# Patient Record
Sex: FEMALE | Race: BLACK OR AFRICAN AMERICAN | Employment: OTHER | URBAN - METROPOLITAN AREA
[De-identification: names, ages, dates, MRNs, and addresses within clinical notes are randomized per-mention and may not be internally consistent; named-entity substitution may affect disease eponyms.]

---

## 2017-04-25 ENCOUNTER — LAB ONLY (OUTPATIENT)
Dept: INTERNAL MEDICINE CLINIC | Age: 72
End: 2017-04-25

## 2017-04-25 DIAGNOSIS — I10 ESSENTIAL HYPERTENSION: ICD-10-CM

## 2017-04-25 DIAGNOSIS — M25.561 CHRONIC PAIN OF BOTH KNEES: ICD-10-CM

## 2017-04-25 DIAGNOSIS — K21.9 GASTROESOPHAGEAL REFLUX DISEASE WITHOUT ESOPHAGITIS: ICD-10-CM

## 2017-04-25 DIAGNOSIS — E78.00 PURE HYPERCHOLESTEROLEMIA: ICD-10-CM

## 2017-04-25 DIAGNOSIS — G89.29 CHRONIC PAIN OF BOTH KNEES: ICD-10-CM

## 2017-04-25 DIAGNOSIS — I25.10 CORONARY ARTERY DISEASE INVOLVING NATIVE CORONARY ARTERY OF NATIVE HEART WITHOUT ANGINA PECTORIS: ICD-10-CM

## 2017-04-25 DIAGNOSIS — R73.03 PREDIABETES: ICD-10-CM

## 2017-04-25 DIAGNOSIS — M25.562 CHRONIC PAIN OF BOTH KNEES: ICD-10-CM

## 2017-04-25 DIAGNOSIS — R04.0 NASAL BLEEDING: ICD-10-CM

## 2017-04-26 ENCOUNTER — DOCUMENTATION ONLY (OUTPATIENT)
Dept: INTERNAL MEDICINE CLINIC | Age: 72
End: 2017-04-26

## 2017-04-26 LAB
ALBUMIN SERPL-MCNC: 4.3 G/DL (ref 3.5–4.8)
ALBUMIN/GLOB SERPL: 1.5 {RATIO} (ref 1.2–2.2)
ALP SERPL-CCNC: 65 IU/L (ref 39–117)
ALT SERPL-CCNC: 18 IU/L (ref 0–32)
AST SERPL-CCNC: 16 IU/L (ref 0–40)
BILIRUB SERPL-MCNC: 0.3 MG/DL (ref 0–1.2)
BUN SERPL-MCNC: 26 MG/DL (ref 8–27)
BUN/CREAT SERPL: 24 (ref 12–28)
CALCIUM SERPL-MCNC: 9.6 MG/DL (ref 8.7–10.3)
CHLORIDE SERPL-SCNC: 101 MMOL/L (ref 96–106)
CHOLEST SERPL-MCNC: 127 MG/DL (ref 100–199)
CO2 SERPL-SCNC: 26 MMOL/L (ref 18–29)
CREAT SERPL-MCNC: 1.1 MG/DL (ref 0.57–1)
ERYTHROCYTE [DISTWIDTH] IN BLOOD BY AUTOMATED COUNT: 17.7 % (ref 12.3–15.4)
EST. AVERAGE GLUCOSE BLD GHB EST-MCNC: 131 MG/DL
GLOBULIN SER CALC-MCNC: 2.9 G/DL (ref 1.5–4.5)
GLUCOSE SERPL-MCNC: 107 MG/DL (ref 65–99)
HBA1C MFR BLD: 6.2 % (ref 4.8–5.6)
HCT VFR BLD AUTO: 37.9 % (ref 34–46.6)
HDLC SERPL-MCNC: 48 MG/DL
HGB BLD-MCNC: 11.6 G/DL (ref 11.1–15.9)
LDLC SERPL CALC-MCNC: 68 MG/DL (ref 0–99)
MCH RBC QN AUTO: 22.7 PG (ref 26.6–33)
MCHC RBC AUTO-ENTMCNC: 30.6 G/DL (ref 31.5–35.7)
MCV RBC AUTO: 74 FL (ref 79–97)
PLATELET # BLD AUTO: 274 X10E3/UL (ref 150–379)
POTASSIUM SERPL-SCNC: 3.8 MMOL/L (ref 3.5–5.2)
PROT SERPL-MCNC: 7.2 G/DL (ref 6–8.5)
RBC # BLD AUTO: 5.12 X10E6/UL (ref 3.77–5.28)
SODIUM SERPL-SCNC: 144 MMOL/L (ref 134–144)
TRIGL SERPL-MCNC: 57 MG/DL (ref 0–149)
VLDLC SERPL CALC-MCNC: 11 MG/DL (ref 5–40)
WBC # BLD AUTO: 6.7 X10E3/UL (ref 3.4–10.8)

## 2017-04-26 NOTE — PROGRESS NOTES
Medicare Part B Preventive Services Guidelines/Limitations Date last completed and Frequency Due Date   Bone Mass Measurement  (age 72 & older, biennial) Requires diagnosis related to osteoporosis or estrogen deficiency. Biennial benefit unless patient has history of long-term glucocorticoid tx or baseline is needed because initial test was by other method Completed 3/28/16      Recommended every 2 years Due 3/2018   Cardiovascular Screening Blood Tests (every 5 years)  Total cholesterol, HDL, Triglycerides Order as a panel if possible Completed 4/2017    Recommended annually Due 4/2018   Colorectal Cancer Screening  -Fecal occult blood test (annual)  -Flexible sigmoidoscopy (5y)  -Screening colonoscopy (10y)  -Barium Enema  Completed 10/07/14 with Dr. Christiano Rothman    Recommended every 5 years  Due 10/2019   Counseling to Prevent Tobacco Use (up to 8 sessions per year)  - Counseling greater than 3 and up to 10 minutes  - Counseling greater than 10 minutes Patients must be asymptomatic of tobacco-related conditions to receive as preventive service     Diabetes Screening Tests (at least every 3 years, Medicare covers annually or at 6-month intervals for prediabetic patients)    Fasting blood sugar (FBS) or glucose tolerance test (GTT) Patient must be diagnosed with one of the following:  -Hypertension, Dyslipidemia, obesity, previous impaired FBS or GTT  Or any two of the following: overweight, FH of diabetes, age ? 72, history of gestational diabetes, birth of baby weighing more than 9 pounds Completed a1c 6.2 in 4/2017    Recommended every 3-6 months for Pre-Diabetics and Diabetics Due 7/2017   Diabetes Self-Management Training (DSMT) (no USPSTF recommendation) Requires referral by treating physician for patient with diabetes or renal disease. 10 hours of initial DSMT session of no less than 30 minutes each in a continuous 12-month period. 2 hours of follow-up DSMT in subsequent years.      Glaucoma Screening (no USPSTF recommendation) Diabetes mellitus, family history, , age 48 or over,  American, age 72 or over Completed with Dr. Reno Romano 3/17/16    Recommended annually Due now; please call and schedule this   Human Immunodeficiency Virus (HIV) Screening (annually for increased risk patients)  HIV-1 and HIV-2 by EIA, CRISTY, rapid antibody test, or oral mucosa transudate Patient must be at increased risk for HIV infection per USPSTF guidelines or pregnant. Tests covered annually for patients at increased risk. Pregnant patients may receive up to 3 test during pregnancy. n/a N/a   Medical Nutrition Therapy (MNT) (for diabetes or renal disease not recommended schedule) Requires referral by treating physician for patient with diabetes or renal disease. Can be provided in same year as diabetes self-management training (DSMT), and CMS recommends medical nutrition therapy take place after DSMT. Up to 3 hours for initial year and 2 hours in subsequent years. N/a  N/a    Prostate Cancer Screening (annually up to age 76)  - Digital rectal exam (LAYLA)  - Prostate specific antigen (PSA) Annually (age 48 or over), LAYLA not paid separately when covered E/M service is provided on same date N/a  N/a    Seasonal Influenza Vaccination (annually)  Completed 11/01/2016    Recommended annually   Due fall 2017   Pneumococcal Vaccination (once after 72)  Pneumococcal 23 - completed 9/02/2014  Recommended once over the age of 72    Prevnar 15 -  Completed 11/03/2016 Recommended once over the age of 72   Complete          Complete   Hepatitis B Vaccinations (if medium/high risk) Medium/high risk factors:  End-stage renal disease,  Hemophiliacs who received Factor VIII or IX concentrates, Clients of institutions for the mentally retarded, Persons who live in the same house as a HepB virus carrier, Homosexual men, Illicit injectable drug abusers. N/a  N/a    Screening Mammography (biennial age 54-69)?  Annually (age 36 or over) Completed 7/18/16 and negative     Recommended annually   Due 7/2017   Screening Pap Tests and Pelvic Examination (up to age 79 and after 79 if unknown history or abnormal study last 10 years) Every 25 months except high risk Completed with Dr. Marco A Ortega in 7/2015    Recommended every other year  Due 7/2017   Ultrasound Screening for Abdominal Aortic Aneurysm (AAA) (once) Patient must be referred through [facility] and not have had a screening for abdominal aortic aneurysm before under Medicare.   Limited to patients who meet one of the following criteria:  - Men who are 73-68 years old and have smoked more than 100 cigarettes in their lifetime.  -Anyone with a FH of AAA  -Anyone recommended for screening by USPSTF 4/2014 and negative Completed    Family Practice Management 2011

## 2017-05-01 ENCOUNTER — OFFICE VISIT (OUTPATIENT)
Dept: INTERNAL MEDICINE CLINIC | Age: 72
End: 2017-05-01

## 2017-05-01 VITALS
TEMPERATURE: 98.1 F | SYSTOLIC BLOOD PRESSURE: 122 MMHG | OXYGEN SATURATION: 99 % | WEIGHT: 146.2 LBS | HEART RATE: 74 BPM | HEIGHT: 67 IN | BODY MASS INDEX: 22.95 KG/M2 | RESPIRATION RATE: 16 BRPM | DIASTOLIC BLOOD PRESSURE: 68 MMHG

## 2017-05-01 DIAGNOSIS — K21.9 GASTROESOPHAGEAL REFLUX DISEASE WITHOUT ESOPHAGITIS: ICD-10-CM

## 2017-05-01 DIAGNOSIS — I10 ESSENTIAL HYPERTENSION: Primary | ICD-10-CM

## 2017-05-01 DIAGNOSIS — Z12.31 ENCOUNTER FOR MAMMOGRAM TO ESTABLISH BASELINE MAMMOGRAM: ICD-10-CM

## 2017-05-01 DIAGNOSIS — Z13.39 SCREENING FOR ALCOHOLISM: ICD-10-CM

## 2017-05-01 DIAGNOSIS — R73.03 PREDIABETES: ICD-10-CM

## 2017-05-01 DIAGNOSIS — R73.01 IFG (IMPAIRED FASTING GLUCOSE): ICD-10-CM

## 2017-05-01 DIAGNOSIS — E78.00 PURE HYPERCHOLESTEROLEMIA: ICD-10-CM

## 2017-05-01 DIAGNOSIS — Z00.00 ROUTINE GENERAL MEDICAL EXAMINATION AT A HEALTH CARE FACILITY: ICD-10-CM

## 2017-05-01 DIAGNOSIS — I25.10 CORONARY ARTERY DISEASE INVOLVING NATIVE CORONARY ARTERY OF NATIVE HEART WITHOUT ANGINA PECTORIS: ICD-10-CM

## 2017-05-01 RX ORDER — FAMOTIDINE 20 MG/1
20 TABLET, FILM COATED ORAL 2 TIMES DAILY
Qty: 180 TAB | Refills: 3 | Status: SHIPPED | OUTPATIENT
Start: 2017-05-01 | End: 2018-05-01

## 2017-05-01 RX ORDER — FELODIPINE 5 MG/1
5 TABLET, EXTENDED RELEASE ORAL DAILY
Qty: 90 TAB | Refills: 1 | Status: SHIPPED | OUTPATIENT
Start: 2017-05-01 | End: 2017-11-01 | Stop reason: SDUPTHER

## 2017-05-01 RX ORDER — ATORVASTATIN CALCIUM 20 MG/1
20 TABLET, FILM COATED ORAL DAILY
Qty: 90 TAB | Refills: 3 | Status: SHIPPED | OUTPATIENT
Start: 2017-05-01 | End: 2017-11-01 | Stop reason: SDUPTHER

## 2017-05-01 RX ORDER — TRIAMTERENE AND HYDROCHLOROTHIAZIDE 37.5; 25 MG/1; MG/1
1 CAPSULE ORAL DAILY
Qty: 90 CAP | Refills: 1 | Status: SHIPPED | OUTPATIENT
Start: 2017-05-01 | End: 2017-11-01 | Stop reason: SDUPTHER

## 2017-05-01 NOTE — PATIENT INSTRUCTIONS
Medicare Part B Preventive Services Guidelines/Limitations Date last completed and Frequency Due Date   Bone Mass Measurement  (age 72 & older, biennial) Requires diagnosis related to osteoporosis or estrogen deficiency. Biennial benefit unless patient has history of long-term glucocorticoid tx or baseline is needed because initial test was by other method Completed 3/28/16        Recommended every 2 years Due 3/2018   Cardiovascular Screening Blood Tests (every 5 years)  Total cholesterol, HDL, Triglycerides Order as a panel if possible Completed 4/2017     Recommended annually Due 4/2018   Colorectal Cancer Screening  -Fecal occult blood test (annual)  -Flexible sigmoidoscopy (5y)  -Screening colonoscopy (10y)  -Barium Enema   Completed 10/07/14 with Dr. Santiago Felton     Recommended every 5 years  Due 10/2019   Counseling to Prevent Tobacco Use (up to 8 sessions per year)  - Counseling greater than 3 and up to 10 minutes  - Counseling greater than 10 minutes Patients must be asymptomatic of tobacco-related conditions to receive as preventive service  n/a  n/a   Diabetes Screening Tests (at least every 3 years, Medicare covers annually or at 6-month intervals for prediabetic patients)     Fasting blood sugar (FBS) or glucose tolerance test (GTT) Patient must be diagnosed with one of the following:  -Hypertension, Dyslipidemia, obesity, previous impaired FBS or GTT  Or any two of the following: overweight, FH of diabetes, age ? 72, history of gestational diabetes, birth of baby weighing more than 9 pounds Completed a1c 6.2 in 4/2017     Recommended every 3-6 months for Pre-Diabetics and Diabetics Due 7/2017   Diabetes Self-Management Training (DSMT) (no USPSTF recommendation) Requires referral by treating physician for patient with diabetes or renal disease. 10 hours of initial DSMT session of no less than 30 minutes each in a continuous 12-month period.  2 hours of follow-up DSMT in subsequent years.  n/a  n/a Glaucoma Screening (no USPSTF recommendation) Diabetes mellitus, family history, , age 48 or over,  American, age 72 or over Completed with Dr. Nory Winters 3/18     Recommended annually 3/18   Human Immunodeficiency Virus (HIV) Screening (annually for increased risk patients)  HIV-1 and HIV-2 by EIA, CRISTY, rapid antibody test, or oral mucosa transudate Patient must be at increased risk for HIV infection per USPSTF guidelines or pregnant. Tests covered annually for patients at increased risk. Pregnant patients may receive up to 3 test during pregnancy. n/a N/a   Medical Nutrition Therapy (MNT) (for diabetes or renal disease not recommended schedule) Requires referral by treating physician for patient with diabetes or renal disease. Can be provided in same year as diabetes self-management training (DSMT), and CMS recommends medical nutrition therapy take place after DSMT. Up to 3 hours for initial year and 2 hours in subsequent years. N/a  N/a    Prostate Cancer Screening (annually up to age 76)  - Digital rectal exam (LAYLA)  - Prostate specific antigen (PSA) Annually (age 48 or over), LAYLA not paid separately when covered E/M service is provided on same date N/a  N/a    Seasonal Influenza Vaccination (annually)   Completed 11/01/2016     Recommended annually Due fall 2017   Pneumococcal Vaccination (once after 72)   Pneumococcal 23 - completed 9/02/2014  Recommended once over the age of 72     Prevnar 15 -  Completed 11/03/2016 Recommended once over the age of 72 Complete              Complete   Hepatitis B Vaccinations (if medium/high risk) Medium/high risk factors: End-stage renal disease,  Hemophiliacs who received Factor VIII or IX concentrates, Clients of institutions for the mentally retarded, Persons who live in the same house as a HepB virus carrier, Homosexual men, Illicit injectable drug abusers. N/a  N/a    Screening Mammography (biennial age 54-69)?  Annually (age 36 or over) Completed 7/18/16 and negative      Recommended annually Due 7/2017   Screening Pap Tests and Pelvic Examination (up to age 79 and after 79 if unknown history or abnormal study last 10 years) Every 25 months except high risk Completed with Dr. Princess Morocho in 7/2015     Recommended every other year  Due 7/2017   Ultrasound Screening for Abdominal Aortic Aneurysm (AAA) (once) Patient must be referred through [facility] and not have had a screening for abdominal aortic aneurysm before under Medicare.  Limited to patients who meet one of the following criteria:  - Men who are 73-68 years old and have smoked more than 100 cigarettes in their lifetime.  -Anyone with a FH of AAA  -Anyone recommended for screening by USPSTF 4/2014 and negative Completed    Family Practice Management 2011

## 2017-05-01 NOTE — MR AVS SNAPSHOT
Visit Information Date & Time Provider Department Dept. Phone Encounter #  
 5/1/2017  8:45 AM Ana Mota, [address] [id number] Follow-up Instructions Return in about 6 months (around 11/1/2017). Upcoming Health Maintenance Date Due  
 MEDICARE YEARLY EXAM 5/4/2017 INFLUENZA AGE 9 TO ADULT 8/1/2017 GLAUCOMA SCREENING Q2Y 3/17/2018 BREAST CANCER SCRN MAMMOGRAM 7/18/2018 COLONOSCOPY 10/13/2024 DTaP/Tdap/Td series (2 - Td) 4/8/2025 Allergies as of 5/1/2017  Review Complete On: 5/1/2017 By: Sea Cat LPN No Known Allergies Current Immunizations  Reviewed on 4/2/2012 Name Date Influenza Vaccine 9/1/2014 Influenza Vaccine (Quad) PF 11/1/2016 Influenza Vaccine Split 10/1/2012 Influenza Vaccine Whole 10/1/2011 Pneumococcal Conjugate (PCV-13) 11/3/2016 Pneumococcal Polysaccharide (PPSV-23) 9/2/2014 Tdap 4/8/2015 Zoster Vaccine, Live 1/1/2010 Not reviewed this visit You Were Diagnosed With   
  
 Codes Comments Essential hypertension    -  Primary ICD-10-CM: I10 
ICD-9-CM: 401.9 Routine general medical examination at a health care facility     ICD-10-CM: Z00.00 ICD-9-CM: V70.0 Screening for alcoholism     ICD-10-CM: Z13.89 ICD-9-CM: V79.1 Coronary artery disease involving native coronary artery of native heart without angina pectoris     ICD-10-CM: I25.10 ICD-9-CM: 414.01   
 Pure hypercholesterolemia     ICD-10-CM: E78.00 ICD-9-CM: 272.0 Prediabetes     ICD-10-CM: R73.03 
ICD-9-CM: 790.29 Gastroesophageal reflux disease without esophagitis     ICD-10-CM: K21.9 ICD-9-CM: 530.81 IFG (impaired fasting glucose)     ICD-10-CM: R73.01 
ICD-9-CM: 790.21 Encounter for mammogram to establish baseline mammogram     ICD-10-CM: Z12.31 
ICD-9-CM: V76.12 Vitals BP Pulse Temp Resp Height(growth percentile) Weight(growth percentile) 122/68 (BP 1 Location: Left arm, BP Patient Position: Sitting) 74 98.1 °F (36.7 °C) (Oral) 16 5' 7\" (1.702 m) 146 lb 3.2 oz (66.3 kg) SpO2 BMI OB Status Smoking Status 99% 22.9 kg/m2 Hysterectomy Never Smoker Vitals History BMI and BSA Data Body Mass Index Body Surface Area  
 22.9 kg/m 2 1.77 m 2 Preferred Pharmacy Pharmacy Name Phone [facility], [address] [facility] [address] [phone] Your Updated Medication List  
  
   
This list is accurate as of: 5/1/17  8:47 AM.  Always use your most recent med list. ALLEGRA 180 mg tablet Generic drug:  fexofenadine Take 180 mg by mouth daily. aspirin 81 mg tablet Take 81 mg by mouth daily. atorvastatin 20 mg tablet Commonly known as:  LIPITOR Take 1 Tab by mouth daily. dorzolamide-timolol 22.3-6.8 mg/mL ophthalmic solution Commonly known as:  COSOPT  
  
 famotidine 20 mg tablet Commonly known as:  PEPCID Take 1 Tab by mouth two (2) times a day. felodipine 5 mg 24 hr tablet Commonly known as:  PLENDIL SR Take 1 Tab by mouth daily. triamterene-hydroCHLOROthiazide 37.5-25 mg per capsule Commonly known as:  Beula Racer Take 1 Cap by mouth daily. VITAMIN D3 1,000 unit Cap Generic drug:  cholecalciferol Take  by mouth. Prescriptions Sent to Pharmacy Refills  
 famotidine (PEPCID) 20 mg tablet 3 Sig: Take 1 Tab by mouth two (2) times a day. Class: Normal  
 Pharmacy: [address] [facility], [address] [facility] Ph #: [phone] Route: Oral  
 felodipine (PLENDIL SR) 5 mg 24 hr tablet 1 Sig: Take 1 Tab by mouth daily. Class: Normal  
 Pharmacy: [address] [facility], [address] [facility] Ph #: [phone] Route: Oral  
 triamterene-hydroCHLOROthiazide (DYAZIDE) 37.5-25 mg per capsule 1 Sig: Take 1 Cap by mouth daily.   
 Class: Normal  
 Pharmacy: [address], [address] [facility] Ph #: [phone] Route: Oral  
 atorvastatin (LIPITOR) 20 mg tablet 3 Sig: Take 1 Tab by mouth daily. Class: Normal  
 Pharmacy: [address], [address] [place] Ph #: [phone] Route: Oral  
  
Follow-up Instructions Return in about 6 months (around 11/1/2017). To-Do List   
 05/17/2017 Lab:  HEMOGLOBIN A1C WITH EAG   
  
 05/17/2017 Lab:  LIPID PANEL   
  
 05/17/2017 Lab:  METABOLIC PANEL, COMPREHENSIVE   
  
 05/17/2017 Lab:  TSH 3RD GENERATION   
  
 07/01/2017 Imaging:  WIN MAMMO BI SCREENING INCL CAD Patient Instructions   
  
Medicare Part B Preventive Services Guidelines/Limitations Date last completed and Frequency Due Date Bone Mass Measurement 
(age 72 & older, biennial) Requires diagnosis related to osteoporosis or estrogen deficiency. Biennial benefit unless patient has history of long-term glucocorticoid tx or baseline is needed because initial test was by other method Completed 3/28/16 
  
  
Recommended every 2 years Due 3/2018 Cardiovascular Screening Blood Tests (every 5 years) Total cholesterol, HDL, Triglycerides Order as a panel if possible Completed 4/2017 
  
Recommended annually Due 4/2018 Colorectal Cancer Screening 
-Fecal occult blood test (annual) -Flexible sigmoidoscopy (5y) 
-Screening colonoscopy (10y) -Barium Enema   Completed 10/07/14 with Dr. Samuel Meza 
  
Recommended every 5 years  Due 10/2019 Counseling to Prevent Tobacco Use (up to 8 sessions per year) - Counseling greater than 3 and up to 10 minutes - Counseling greater than 10 minutes Patients must be asymptomatic of tobacco-related conditions to receive as preventive service  n/a  n/a Diabetes Screening Tests (at least every 3 years, Medicare covers annually or at 6-month intervals for prediabetic patients) 
  
 Fasting blood sugar (FBS) or glucose tolerance test (GTT) Patient must be diagnosed with one of the following: 
-Hypertension, Dyslipidemia, obesity, previous impaired FBS or GTT 
Or any two of the following: overweight, FH of diabetes, age ? 72, history of gestational diabetes, birth of baby weighing more than 9 pounds Completed a1c 6.2 in 4/2017 
  
Recommended every 3-6 months for Pre-Diabetics and Diabetics Due 7/2017 Diabetes Self-Management Training (DSMT) (no USPSTF recommendation) Requires referral by treating physician for patient with diabetes or renal disease. 10 hours of initial DSMT session of no less than 30 minutes each in a continuous 12-month period. 2 hours of follow-up DSMT in subsequent years.  n/a  n/a Glaucoma Screening (no USPSTF recommendation) Diabetes mellitus, family history, , age 48 or over,  American, age 72 or over Completed with Dr. Marnie Dancer 3/18 
  
Recommended annually 3/18 Human Immunodeficiency Virus (HIV) Screening (annually for increased risk patients) HIV-1 and HIV-2 by EIA, CRISTY, rapid antibody test, or oral mucosa transudate Patient must be at increased risk for HIV infection per USPSTF guidelines or pregnant. Tests covered annually for patients at increased risk. Pregnant patients may receive up to 3 test during pregnancy. n/a N/a Medical Nutrition Therapy (MNT) (for diabetes or renal disease not recommended schedule) Requires referral by treating physician for patient with diabetes or renal disease. Can be provided in same year as diabetes self-management training (DSMT), and CMS recommends medical nutrition therapy take place after DSMT. Up to 3 hours for initial year and 2 hours in subsequent years. N/a  N/a Prostate Cancer Screening (annually up to age 76) - Digital rectal exam (LAYLA) - Prostate specific antigen (PSA) Annually (age 48 or over), LAYLA not paid separately when covered E/M service is provided on same date N/a  N/a   
 Seasonal Influenza Vaccination (annually)   Completed 11/01/2016 
  
Recommended annually Due fall 2017 Pneumococcal Vaccination (once after 65)   Pneumococcal 23 - completed 9/02/2014 Recommended once over the age of 72 
  
Prevnar 15 - Completed 11/03/2016 Recommended once over the age of 72 Complete 
  
  
  
  
Complete Hepatitis B Vaccinations (if medium/high risk) Medium/high risk factors: End-stage renal disease, Hemophiliacs who received Factor VIII or IX concentrates, Clients of institutions for the mentally retarded, Persons who live in the same house as a HepB virus carrier, Homosexual men, Illicit injectable drug abusers. N/a  N/a Screening Mammography (biennial age 54-69)? Annually (age 36 or over) Completed 7/18/16 and negative  
  
Recommended annually Due 7/2017 Screening Pap Tests and Pelvic Examination (up to age 79 and after 79 if unknown history or abnormal study last 10 years) Every 24 months except high risk Completed with Dr. Monae Jiménez in 7/2015 
  
Recommended every other year  Due 7/2017 Ultrasound Screening for Abdominal Aortic Aneurysm (AAA) (once) Patient must be referred through [facility] and not have had a screening for abdominal aortic aneurysm before under Medicare. Limited to patients who meet one of the following criteria: 
- Men who are 73-68 years old and have smoked more than 100 cigarettes in their lifetime. 
-Anyone with a FH of AAA 
-Anyone recommended for screening by USPSTF 4/2014 and negative Completed Family Practice Management 2011 
  
  
 
 
  
Introducing [facility]! Jennie Vilchis introduces [employer] patient portal. Now you can access parts of your medical record, email your doctor's office, and request medication refills online. 1. In your internet browser, go to [email or web address] 2. Click on the First Time User? Click Here link in the Sign In box. You will see the New Member Sign Up page. 3. Enter your [employer] Access Code exactly as it appears below. You will not need to use this code after youve completed the sign-up process. If you do not sign up before the expiration date, you must request a new code. · [employer] Access Code: [email or web address] Expires: 7/30/2017  8:47 AM 
 
4. Enter the last four digits of your Social Security Number (xxxx) and Date of Birth (mm/dd/yyyy) as indicated and click Submit. You will be taken to the next sign-up page. 5. Create a [employer] ID. This will be your [employer] login ID and cannot be changed, so think of one that is secure and easy to remember. 6. Create a [employer] password. You can change your password at any time. 7. Enter your Password Reset Question and Answer. This can be used at a later time if you forget your password. 8. Enter your e-mail address. You will receive e-mail notification when new information is available in [address] 9. Click Sign Up. You can now view and download portions of your medical record. 10. Click the Download Summary menu link to download a portable copy of your medical information. If you have questions, please visit the Frequently Asked Questions section of the [employer] website. Remember, [employer] is NOT to be used for urgent needs. For medical emergencies, dial 911. Now available from your iPhone and Android! Please provide this summary of care documentation to your next provider. Your primary care clinician is listed as Ihsan Fallon. If you have any questions after today's visit, please call [phone].

## 2017-05-01 NOTE — PROGRESS NOTES
This is a Subsequent Medicare Annual Wellness Visit providing Personalized Prevention Plan Services (PPPS) (Performed 12 months after initial AWV and PPPS )    I have reviewed the patient's medical history in detail and updated the computerized patient record. History     Past Medical History:   Diagnosis Date    Dyspepsia and other specified disorders of function of stomach     GERD (gastroesophageal reflux disease)     H/O allergy     Hypercholesterolemia     Hypertension     Indigestion     Other ill-defined conditions     elevated cholesterol      Past Surgical History:   Procedure Laterality Date    ABDOMEN SURGERY PROC UNLISTED  14    LAPAROSCOPIC CHOLECYSTECTOMY with GRAMS    HX  SECTION      HX CHOLECYSTECTOMY  14    lap mando with cholangiogram    HX HYSTERECTOMY      HX OTHER SURGICAL  14    ERCPwith biliary sphincterotomy CBD balloon sweeps      HX TUBAL LIGATION       Current Outpatient Prescriptions   Medication Sig Dispense Refill    pantoprazole (PROTONIX) 20 mg tablet       famotidine (PEPCID) 20 mg tablet Take 1 Tab by mouth two (2) times a day. 20 Tab 0    felodipine (PLENDIL SR) 5 mg 24 hr tablet Take 1 Tab by mouth daily. 90 Tab 4    triamterene-hydrochlorothiazide (DYAZIDE) 37.5-25 mg per capsule Take 1 Cap by mouth daily. 90 Cap 4    atorvastatin (LIPITOR) 20 mg tablet Take 1 Tab by mouth daily. 90 Tab 4    dorzolamide-timolol (COSOPT) 22.3-6.8 mg/mL ophthalmic solution       Cholecalciferol, Vitamin D3, (VITAMIN D3) 1,000 unit cap Take  by mouth.  fexofenadine (ALLEGRA) 180 mg tablet Take 180 mg by mouth daily.  aspirin 81 mg tablet Take 81 mg by mouth daily.          No Known Allergies  Family History   Problem Relation Age of Onset    Hypertension Mother     Cancer Brother      prostate     Social History   Substance Use Topics    Smoking status: Never Smoker    Smokeless tobacco: Never Used    Alcohol use No     Patient Active Problem List   Diagnosis Code    H/O allergy Z88.9    Hypertension I10    Indigestion K30    CAD (coronary artery disease) I25.10    Hyperlipidemia E78.5    S/P laparoscopic cholecystectomy Z90.49    Prediabetes R73.03    ACP (advance care planning) Z71.89    Angioedema T78. 3XXA       Depression Risk Factor Screening:     PHQ 2 / 9, over the last two weeks 5/1/2017   Little interest or pleasure in doing things Not at all   Feeling down, depressed or hopeless Not at all   Total Score PHQ 2 0   none  Alcohol Risk Factor Screening: On any occasion during the past 3 months, have you had more than 3 drinks containing alcohol? No    Do you average more than 7 drinks per week? No    none  Functional Ability and Level of Safety:     Hearing Loss   normal-to-mild    Activities of Daily Living   Self-care. Requires assistance with: no ADLs    Fall Risk     Fall Risk Assessment, last 12 mths 5/1/2017   Able to walk? Yes   Fall in past 12 months? No   no falls  Abuse Screen   Patient is not abused  Lives with   Review of Systems   Not required    Physical Examination     Evaluation of Cognitive Function:  Mood/affect:  neutral  Appearance: age appropriate  Family member/caregiver input: none    No exam performed today, awv. Patient Care Team:  Jesus Uribe MD as PCP - General (Internal Medicine)  Lamberto Jaime MD as Consulting Provider (Obstetrics & Gynecology)  Tena Hagan MD (Gastroenterology)  Rosaura Edwards  (Ophthalmology)  Yue Meier, RN as Nurse Navigator     Updated list    Advice/Referrals/Counseling   Education and counseling provided:  Are appropriate based on today's review and evaluation  Screening Mammography  Screening Pap and pelvic (covered once every 2 years)      Assessment/Plan       ICD-10-CM ICD-9-CM    1. Routine general medical examination at a health care facility Z00.00 V70.0    2. Screening for alcoholism Z13.89 V79.1    .       Discussed with patient about advance medical directive. ACP: on file, SDM is her , Onel Finley         Colonoscopy: 10/7/14, Dr. Caleb Candelario, repeat 5 years  Pap: Dr. Gutierrez Pinon, 7/15, hysterectomy and BSO 1994, every other year  Due 7/17  Mammogram: 7/18/16 negative, declines 3D mammo, ordered for 7/17  DEXA: 3/28/16 normal repeat 3/18    Tdap; 4/08/2015  Pneumovax: 9/02/2014  [id number]: 11/03/2016  Zostavax: 2010    Flu shot: 11/01/2016    Eye exam: Dr. Wyatt Ibarra, 3/17 annual     A1c:4/17 6.2  Repeat 7/17  Lipids: 4/17 LDL 68  Annual     Medication reconciliation completed by MA and reviewed by me. Medical/surgical/social/family history reviewed and updated by me. Patient provided AVS and preventative screening table. Patient verbalized understanding of all information discussed.

## 2017-05-01 NOTE — PROGRESS NOTES
HISTORY OF PRESENT ILLNESS  Charlotte Buchanan is a 66 y.o. female. HPI   Last here 11/01/16. Pt is here to f/u on chronic conditions    BP today is 122/68  BP at home running around 120s/70s, one low of 90/60  Denies any orthostatic sx here or at home  Continues dyazide and felodipine daily     Reviewed last labs 4/17  a1c 6.2-stable, kidney nl, liver nl, LDL 68    Continues lipitor 20mg daily for cholesterol- at goal last check    Wt is stable since last visit  Her weight is within normal ranges  She walks everyday for exercise   Denies any alcohol consumption     Pt is no longer taking protonix for reflux  She now takes pepcid daily which works well, no breakthrough    Continues vit D 1000 units daily OTC     Recall saw cardiologist in past for cp, no blockage ever found. No recurrent chest pain    ACP: on file, SDM is her , Kristie Mary         PREVENTIVE:    Colonoscopy: 10/7/14, Dr. JEAN CARLOS SAMAYOA [facility], repeat 5 years  Pap: Dr. Justin Stein, 7/15, hysterectomy and BSO 1994, every other year   Mammogram: 7/18/16 negative, declines 3D mammo, ordered for 7/17  DEXA: 3/28/16 normal  Tdap; 4/08/2015  Pneumovax: 9/02/2014  Kujamnj75: 11/03/2016  Zostavax: 2010    Flu shot: 11/01/2016  A1c: 6.0, 9/13 5.8, 12/13 6.1, 8/14 6.1, 9/15 6.3, 12/15 6.2, 5/16 6.1, 10/16 6.2, 4/17 6.2  Eye exam: Dr. Linda Johnston, 3/17  Lipids: 4/17 LDL 68        Patient Active Problem List    Diagnosis Date Noted    Angioedema 08/25/2016    ACP (advance care planning) 12/08/2015    Prediabetes 04/08/2015    S/P laparoscopic cholecystectomy 05/11/2014    Hyperlipidemia 04/01/2013    CAD (coronary artery disease) 10/01/2012    H/O allergy     Hypertension     Indigestion      Current Outpatient Prescriptions   Medication Sig Dispense Refill    pantoprazole (PROTONIX) 20 mg tablet       famotidine (PEPCID) 20 mg tablet Take 1 Tab by mouth two (2) times a day. 20 Tab 0    felodipine (PLENDIL SR) 5 mg 24 hr tablet Take 1 Tab by mouth daily.  90 Tab 4    triamterene-hydrochlorothiazide (DYAZIDE) 37.5-25 mg per capsule Take 1 Cap by mouth daily. 90 Cap 4    atorvastatin (LIPITOR) 20 mg tablet Take 1 Tab by mouth daily. 90 Tab 4    dorzolamide-timolol (COSOPT) 22.3-6.8 mg/mL ophthalmic solution       Cholecalciferol, Vitamin D3, (VITAMIN D3) 1,000 unit cap Take  by mouth.  fexofenadine (ALLEGRA) 180 mg tablet Take 180 mg by mouth daily.  aspirin 81 mg tablet Take 81 mg by mouth daily. Past Surgical History:   Procedure Laterality Date    ABDOMEN SURGERY PROC UNLISTED  14    LAPAROSCOPIC CHOLECYSTECTOMY with GRAMS    HX  SECTION      HX CHOLECYSTECTOMY  14    lap mando with cholangiogram    HX HYSTERECTOMY      HX OTHER SURGICAL  14    ERCPwith biliary sphincterotomy CBD balloon sweeps      HX TUBAL LIGATION        Lab Results  Component Value Date/Time   WBC 6.7 2017 08:35 AM   HGB 11.6 2017 08:35 AM   HCT 37.9 2017 08:35 AM   PLATELET 815 48/15/7179 08:35 AM   MCV 74 2017 08:35 AM       Lab Results  Component Value Date/Time   Cholesterol, total 127 2017 08:35 AM   HDL Cholesterol 48 2017 08:35 AM   LDL, calculated 68 2017 08:35 AM   Triglyceride 57 2017 08:35 AM       Lab Results  Component Value Date/Time   GFR est AA 58 2017 08:35 AM   GFR est non-AA 50 2017 08:35 AM   Creatinine 1.10 2017 08:35 AM   BUN 26 2017 08:35 AM   Sodium 144 2017 08:35 AM   Potassium 3.8 2017 08:35 AM   Chloride 101 2017 08:35 AM   CO2 26 2017 08:35 AM         Review of Systems   HENT: Negative for hearing loss. Respiratory: Negative for shortness of breath. Cardiovascular: Negative for chest pain. Psychiatric/Behavioral: Negative for depression. Physical Exam   Constitutional: She is oriented to person, place, and time. She appears well-developed and well-nourished. No distress. HENT:   Head: Normocephalic and atraumatic. Mouth/Throat: Oropharynx is clear and moist. No oropharyngeal exudate. Eyes: Conjunctivae and EOM are normal. Right eye exhibits no discharge. Left eye exhibits no discharge. Neck: Normal range of motion. Neck supple. No carotid bruits     Cardiovascular: Normal rate, regular rhythm, normal heart sounds and intact distal pulses. Exam reveals no gallop and no friction rub. No murmur heard. Pulmonary/Chest: Effort normal and breath sounds normal. No respiratory distress. She has no wheezes. She has no rales. She exhibits no tenderness. Abdominal: Soft. She exhibits no distension and no mass. There is no tenderness. There is no rebound and no guarding. Musculoskeletal: Normal range of motion. She exhibits no edema, tenderness or deformity. Lymphadenopathy:     She has no cervical adenopathy. Neurological: She is alert and oriented to person, place, and time. Coordination normal.   Skin: Skin is warm and dry. No rash noted. She is not diaphoretic. No erythema. No pallor. Psychiatric: She has a normal mood and affect. Her behavior is normal.       ASSESSMENT and PLAN    ICD-10-CM ICD-9-CM    1. Essential hypertension    BP well controlled on dyazide and felodipine, continue no change to dose. Q18 810.2 METABOLIC PANEL, COMPREHENSIVE      HEMOGLOBIN A1C WITH EAG      LIPID PANEL      TSH 3RD GENERATION   2. Routine general medical examination at a health care facility P41.72 U19.7 METABOLIC PANEL, COMPREHENSIVE      HEMOGLOBIN A1C WITH EAG      LIPID PANEL      TSH 3RD GENERATION   3. Screening for alcoholism    Screen    L83.13 L61.3 METABOLIC PANEL, COMPREHENSIVE      HEMOGLOBIN A1C WITH EAG      LIPID PANEL      TSH 3RD GENERATION   4.  Coronary artery disease involving native coronary artery of native heart without angina pectoris    Was previously followed by cardiology in [place], [place], she never had heart catheterization but had h/o chest pain which ultimately was related more to reflux and resolved. She no longer follows with cardiology. C68.97 512.12 METABOLIC PANEL, COMPREHENSIVE      HEMOGLOBIN A1C WITH EAG      LIPID PANEL      TSH 3RD GENERATION   5. Pure hypercholesterolemia    Controlled on lipitor 20mg daily, continue. B37.70 377.9 METABOLIC PANEL, COMPREHENSIVE      HEMOGLOBIN A1C WITH EAG      LIPID PANEL      TSH 3RD GENERATION   6. Prediabetes    a1c stable at 6.2, wt is normal, will continue to closely monitor, avoid simple carbohydrates A29.95 738.55 METABOLIC PANEL, COMPREHENSIVE      HEMOGLOBIN A1C WITH EAG      LIPID PANEL      TSH 3RD GENERATION   7. Gastroesophageal reflux disease without esophagitis    Controlled on pepcid B61.8 177.83 METABOLIC PANEL, COMPREHENSIVE      HEMOGLOBIN A1C WITH EAG      LIPID PANEL      TSH 3RD GENERATION   8. IFG (impaired fasting glucose)    See above L40.81 602.88 METABOLIC PANEL, COMPREHENSIVE      HEMOGLOBIN A1C WITH EAG      LIPID PANEL      TSH 3RD GENERATION   9. Encounter for mammogram to establish baseline mammogram    Ordered  Z12.31 V76.12 [facility] MAMMO BI SCREENING INCL CAD      METABOLIC PANEL, COMPREHENSIVE      HEMOGLOBIN A1C WITH EAG      LIPID PANEL      TSH 3RD GENERATION          Depression screen reviewed and negative      Written by Kobi Nagy, as dictated by Rolan Darling MD.    Current diagnosis and concerns discussed with pt at length. Understands risks and benefits or current treatment plan and medications and accepts the treatment and medication with any possible risks.   Pt asks appropriate questions which were answered.   Pt instructed to call with any concerns or problems. This note will not be viewable in [address]

## 2017-06-26 RX ORDER — PANTOPRAZOLE SODIUM 20 MG/1
20 TABLET, DELAYED RELEASE ORAL DAILY
Qty: 30 TAB | Refills: 1 | Status: SHIPPED | OUTPATIENT
Start: 2017-06-26 | End: 2017-11-01 | Stop reason: SDUPTHER

## 2017-06-26 NOTE — TELEPHONE ENCOUNTER
Patient states she just received a mail order prescription for Famotidine & is unsure why this was changed from her Protonix. Since taking Famotidine pt reports feeling dizzy & having GI upset. I reviewed chart with pt & explained Protonix was d/c in ED 08/2016 d/t possible allergic rxn. It has been noted in every office visit since then that she is stable on Famotidine. Pt states she never stopped taking Protonix & it has been helping her sxs. Advised pt I will consult with Dr. Linda Au to advise further.

## 2017-06-26 NOTE — TELEPHONE ENCOUNTER
Requested Prescriptions     Pending Prescriptions Disp Refills    pantoprazole (PROTONIX) 20 mg tablet 30 Tab 1     Sig: Take 1 Tab by mouth daily.          Last Office Visit: 5.1.17    Upcoming Appointment:11.1.17

## 2017-06-27 NOTE — TELEPHONE ENCOUNTER
Return pt call, ID verified x2. Pt clarified that she had no problems with the protonix. She prefers this medication over the famotidine. New Rx for protonix sent to pt pharmacy. Pt had no further questions or concerns at this time.

## 2017-07-24 ENCOUNTER — HOSPITAL ENCOUNTER (OUTPATIENT)
Dept: MAMMOGRAPHY | Age: 72
Discharge: HOME | End: 2017-07-24
Attending: INTERNAL MEDICINE
Payer: MEDICARE

## 2017-07-24 DIAGNOSIS — Z12.31 ENCOUNTER FOR MAMMOGRAM TO ESTABLISH BASELINE MAMMOGRAM: ICD-10-CM

## 2017-07-24 PROCEDURE — 77067 SCR MAMMO BI INCL CAD: CPT

## 2017-10-12 ENCOUNTER — CLINICAL SUPPORT (OUTPATIENT)
Dept: INTERNAL MEDICINE CLINIC | Age: 72
End: 2017-10-12

## 2017-10-12 DIAGNOSIS — Z23 ENCOUNTER FOR IMMUNIZATION: ICD-10-CM

## 2017-10-12 NOTE — PROGRESS NOTES
After obtaining consent, and per verbal order from Dr. Danni Kennedy, patient received influenza vaccine given by Mau Colunga LPN in [place]. Influenza Vaccine 0.5 mL IM now. Patient was observed for 10 minutes post injection. Patient tolerated injection well. VIS given.

## 2017-10-25 ENCOUNTER — LAB ONLY (OUTPATIENT)
Dept: INTERNAL MEDICINE CLINIC | Age: 72
End: 2017-10-25

## 2017-10-25 DIAGNOSIS — R73.01 IFG (IMPAIRED FASTING GLUCOSE): ICD-10-CM

## 2017-10-25 DIAGNOSIS — Z00.00 ROUTINE GENERAL MEDICAL EXAMINATION AT A HEALTH CARE FACILITY: ICD-10-CM

## 2017-10-25 DIAGNOSIS — E78.00 PURE HYPERCHOLESTEROLEMIA: ICD-10-CM

## 2017-10-25 DIAGNOSIS — R73.03 PREDIABETES: ICD-10-CM

## 2017-10-25 DIAGNOSIS — Z13.39 SCREENING FOR ALCOHOLISM: ICD-10-CM

## 2017-10-25 DIAGNOSIS — I25.10 CORONARY ARTERY DISEASE INVOLVING NATIVE CORONARY ARTERY OF NATIVE HEART WITHOUT ANGINA PECTORIS: ICD-10-CM

## 2017-10-25 DIAGNOSIS — K21.9 GASTROESOPHAGEAL REFLUX DISEASE WITHOUT ESOPHAGITIS: ICD-10-CM

## 2017-10-25 DIAGNOSIS — I10 ESSENTIAL HYPERTENSION: ICD-10-CM

## 2017-10-25 DIAGNOSIS — Z12.31 ENCOUNTER FOR MAMMOGRAM TO ESTABLISH BASELINE MAMMOGRAM: ICD-10-CM

## 2017-10-26 LAB
ALBUMIN SERPL-MCNC: 4.3 G/DL (ref 3.5–4.8)
ALBUMIN/GLOB SERPL: 1.3 {RATIO} (ref 1.2–2.2)
ALP SERPL-CCNC: 77 IU/L (ref 39–117)
ALT SERPL-CCNC: 17 IU/L (ref 0–32)
AST SERPL-CCNC: 15 IU/L (ref 0–40)
BILIRUB SERPL-MCNC: 0.4 MG/DL (ref 0–1.2)
BUN SERPL-MCNC: 17 MG/DL (ref 8–27)
BUN/CREAT SERPL: 14 (ref 12–28)
CALCIUM SERPL-MCNC: 9.5 MG/DL (ref 8.7–10.3)
CHLORIDE SERPL-SCNC: 102 MMOL/L (ref 96–106)
CHOLEST SERPL-MCNC: 142 MG/DL (ref 100–199)
CO2 SERPL-SCNC: 27 MMOL/L (ref 18–29)
CREAT SERPL-MCNC: 1.21 MG/DL (ref 0.57–1)
EST. AVERAGE GLUCOSE BLD GHB EST-MCNC: 117 MG/DL
GFR SERPLBLD CREATININE-BSD FMLA CKD-EPI: 45 ML/MIN/1.73
GFR SERPLBLD CREATININE-BSD FMLA CKD-EPI: 52 ML/MIN/1.73
GLOBULIN SER CALC-MCNC: 3.4 G/DL (ref 1.5–4.5)
GLUCOSE SERPL-MCNC: 98 MG/DL (ref 65–99)
HBA1C MFR BLD: 5.7 % (ref 4.8–5.6)
HDLC SERPL-MCNC: 51 MG/DL
LDLC SERPL CALC-MCNC: 75 MG/DL (ref 0–99)
POTASSIUM SERPL-SCNC: 4 MMOL/L (ref 3.5–5.2)
PROT SERPL-MCNC: 7.7 G/DL (ref 6–8.5)
SODIUM SERPL-SCNC: 144 MMOL/L (ref 134–144)
TRIGL SERPL-MCNC: 79 MG/DL (ref 0–149)
TSH SERPL DL<=0.005 MIU/L-ACNC: 1.2 UIU/ML (ref 0.45–4.5)
VLDLC SERPL CALC-MCNC: 16 MG/DL (ref 5–40)

## 2017-11-01 ENCOUNTER — OFFICE VISIT (OUTPATIENT)
Dept: INTERNAL MEDICINE CLINIC | Age: 72
End: 2017-11-01

## 2017-11-01 VITALS
OXYGEN SATURATION: 97 % | HEART RATE: 76 BPM | TEMPERATURE: 97.7 F | DIASTOLIC BLOOD PRESSURE: 74 MMHG | WEIGHT: 146 LBS | HEIGHT: 67 IN | BODY MASS INDEX: 22.91 KG/M2 | SYSTOLIC BLOOD PRESSURE: 132 MMHG | RESPIRATION RATE: 16 BRPM

## 2017-11-01 DIAGNOSIS — M54.50 ACUTE LEFT-SIDED LOW BACK PAIN WITHOUT SCIATICA: ICD-10-CM

## 2017-11-01 DIAGNOSIS — I10 ESSENTIAL HYPERTENSION: Primary | ICD-10-CM

## 2017-11-01 DIAGNOSIS — R73.01 IFG (IMPAIRED FASTING GLUCOSE): ICD-10-CM

## 2017-11-01 DIAGNOSIS — N18.2 CKD (CHRONIC KIDNEY DISEASE) STAGE 2, GFR 60-89 ML/MIN: ICD-10-CM

## 2017-11-01 DIAGNOSIS — K30 INDIGESTION: ICD-10-CM

## 2017-11-01 DIAGNOSIS — E78.00 PURE HYPERCHOLESTEROLEMIA: ICD-10-CM

## 2017-11-01 RX ORDER — FELODIPINE 5 MG/1
5 TABLET, EXTENDED RELEASE ORAL DAILY
Qty: 90 TAB | Refills: 1 | Status: SHIPPED | OUTPATIENT
Start: 2017-11-01 | End: 2018-06-18 | Stop reason: SDUPTHER

## 2017-11-01 RX ORDER — PANTOPRAZOLE SODIUM 20 MG/1
20 TABLET, DELAYED RELEASE ORAL DAILY
Qty: 90 TAB | Refills: 1 | Status: SHIPPED | OUTPATIENT
Start: 2017-11-01 | End: 2018-05-02 | Stop reason: SDUPTHER

## 2017-11-01 RX ORDER — TRIAMTERENE AND HYDROCHLOROTHIAZIDE 37.5; 25 MG/1; MG/1
1 CAPSULE ORAL DAILY
Qty: 90 CAP | Refills: 1 | Status: SHIPPED | OUTPATIENT
Start: 2017-11-01 | End: 2018-05-02 | Stop reason: SDUPTHER

## 2017-11-01 RX ORDER — ATORVASTATIN CALCIUM 20 MG/1
20 TABLET, FILM COATED ORAL DAILY
Qty: 90 TAB | Refills: 3 | Status: SHIPPED | OUTPATIENT
Start: 2017-11-01 | End: 2018-11-06 | Stop reason: SDUPTHER

## 2017-11-01 NOTE — PROGRESS NOTES
HISTORY OF PRESENT ILLNESS  Debi Hankins is a 66 y.o. female. HPI   Last here 5/1/17. Pt is here to f/u on chronic conditions.     BP today is 132/74  BP at home running around 107, 110, 113/60s, 99/69  Continues on dyazide and felodipine daily     Wt is stable since last visit  Her weight is within normal ranges    Pt c/o soreness to L side of lower back x 1 month - improving  Pt experiences a constant pain if she palpates this area (\"2 out of 10\")  Pt states that she stopped lifting weights with improvement to sx   Pt denies injuring this area  Pt denies having bladder/bowel incontinence   Discussed her sx being d/t muscle spasms   Discussed ordering muscle relaxers - pt is not amenable to taking muscle relaxers at this time  Advised her not to lift weights   Provided her with exercises to complete at home  If her sx progress, will order XR L spine     Reviewed last labs 10/17: thyroid nl, cr 1.1-1.2  Will repeat an US kidneys  Ordered labs to complete prior to next visit      Continues on lipitor 20mg daily for cholesterol - at goal last check    Pt was taking pepcid 20mg BID for reflux   However, she was burping and stopped this medication  She is now taking protonix 20mg daily for reflux - works well, no breakthrough      Continues on vit D OTC 1000U daily     Reviewed mammogram 7/24/17: negative     Recall saw cardiologist in past for CP. No blockage was found. No recurrent CP.     ACP on file: SDM is her  Cliff Mendez).         PREVENTIVE:    Colonoscopy: 10/7/14, Dr. Nabila Davis, repeat 5 years  Pap: Dr. Maryellen Mcardle, 7/15,  every other year, hysterectomy and BSO in 1994   Mammogram: 7/17, negative, declines 3D mammo  DEXA: 3/28/16 normal, due 3/17  Tdap; 4/08/2015  Pneumovax: 9/02/2014  [id number]: 11/03/2016  Zostavax: 2010    Flu shot: 10/12/17  A1c: 6.0, 9/13 5.8, 12/13 6.1, 8/14 6.1, 9/15 6.3, 12/15 6.2, 5/16 6.1, 10/16 6.2, 4/17 6.2, 10/17 5.7  Eye exam: Dr. Justo Joaquin, 3/17  Lipids: 10/17 LDL 75       Patient Active Problem List    Diagnosis Date Noted    Angioedema 2016    ACP (advance care planning) 2015    Prediabetes 2015    S/P laparoscopic cholecystectomy 2014    Hyperlipidemia 2013    CAD (coronary artery disease) 10/01/2012    H/O allergy     Hypertension     Indigestion      Current Outpatient Prescriptions   Medication Sig Dispense Refill    pantoprazole (PROTONIX) 20 mg tablet Take 1 Tab by mouth daily. 30 Tab 1    famotidine (PEPCID) 20 mg tablet Take 1 Tab by mouth two (2) times a day. 180 Tab 3    felodipine (PLENDIL SR) 5 mg 24 hr tablet Take 1 Tab by mouth daily. 90 Tab 1    triamterene-hydroCHLOROthiazide (DYAZIDE) 37.5-25 mg per capsule Take 1 Cap by mouth daily. 90 Cap 1    atorvastatin (LIPITOR) 20 mg tablet Take 1 Tab by mouth daily. 90 Tab 3    dorzolamide-timolol (COSOPT) 22.3-6.8 mg/mL ophthalmic solution       Cholecalciferol, Vitamin D3, (VITAMIN D3) 1,000 unit cap Take  by mouth.  fexofenadine (ALLEGRA) 180 mg tablet Take 180 mg by mouth daily.  aspirin 81 mg tablet Take 81 mg by mouth daily.          Past Surgical History:   Procedure Laterality Date    ABDOMEN SURGERY PROC UNLISTED  14    LAPAROSCOPIC CHOLECYSTECTOMY with GRAMS    HX  SECTION      HX CHOLECYSTECTOMY  14    lap mando with cholangiogram    HX HYSTERECTOMY      HX OTHER SURGICAL  14    ERCPwith biliary sphincterotomy CBD balloon sweeps      HX TUBAL LIGATION        Lab Results  Component Value Date/Time   WBC 6.7 2017 08:35 AM   HGB 11.6 2017 08:35 AM   HCT 37.9 2017 08:35 AM   PLATELET 746  08:35 AM   MCV 74 2017 08:35 AM     Lab Results  Component Value Date/Time   Cholesterol, total 142 10/25/2017 09:18 AM   HDL Cholesterol 51 10/25/2017 09:18 AM   LDL, calculated 75 10/25/2017 09:18 AM   Triglyceride 79 10/25/2017 09:18 AM     Lab Results  Component Value Date/Time   GFR est non-AA 45 10/25/2017 09:18 AM   GFR est AA 52 10/25/2017 09:18 AM   Creatinine 1.21 10/25/2017 09:18 AM   BUN 17 10/25/2017 09:18 AM   Sodium 144 10/25/2017 09:18 AM   Potassium 4.0 10/25/2017 09:18 AM   Chloride 102 10/25/2017 09:18 AM   CO2 27 10/25/2017 09:18 AM          Review of Systems   Respiratory: Negative for shortness of breath. Cardiovascular: Negative for chest pain. Musculoskeletal: Positive for back pain. Physical Exam   Constitutional: She is oriented to person, place, and time. She appears well-developed and well-nourished. No distress. HENT:   Head: Normocephalic and atraumatic. Mouth/Throat: Oropharynx is clear and moist. No oropharyngeal exudate. Eyes: Conjunctivae and EOM are normal. Right eye exhibits no discharge. Left eye exhibits no discharge. Neck: Normal range of motion. Neck supple. Cardiovascular: Normal rate, regular rhythm, normal heart sounds and intact distal pulses. Exam reveals no gallop and no friction rub. No murmur heard. Pulmonary/Chest: Effort normal and breath sounds normal. No respiratory distress. She has no wheezes. She has no rales. She exhibits no tenderness. Abdominal: Soft. She exhibits no distension. There is no tenderness. Musculoskeletal: Normal range of motion. She exhibits tenderness (L side of back). She exhibits no edema or deformity. Lymphadenopathy:     She has no cervical adenopathy. Neurological: She is alert and oriented to person, place, and time. Coordination normal.   Skin: Skin is warm and dry. No rash noted. She is not diaphoretic. No erythema. No pallor. Psychiatric: She has a normal mood and affect. Her behavior is normal.       ASSESSMENT and PLAN    ICD-10-CM ICD-9-CM    1. Essential hypertension    BP well-controled on dyazide and felodipine, continue, no change to dose    F35 384.5 METABOLIC PANEL, COMPREHENSIVE      HEMOGLOBIN A1C WITH EAG   2.  Pure hypercholesterolemia    At goal on lipitor, no change to dose   E78.00 080.4 METABOLIC PANEL, COMPREHENSIVE      HEMOGLOBIN A1C WITH EAG   3. IFG (impaired fasting glucose)    a1cs remain stable, pt has a nl wt, watches her diet, will monitor q6 months    F15.87 880.67 METABOLIC PANEL, COMPREHENSIVE      HEMOGLOBIN A1C WITH EAG   4. CKD (chronic kidney disease) stage 2, GFR 60-89 ml/min    Very mild kidney dysfunction, will check US for further eval    Of note will also f/u on renal cyst that was previously seen and for which she was sent to urology for previously for eval   N18.2 585.2 US RETROPERITONEUM COMP      METABOLIC PANEL, COMPREHENSIVE      HEMOGLOBIN A1C WITH EAG   5. Acute left-sided low back pain without sciatica    Sx are musculoskeletal in etiology, discussed a muscle relaxer, since pain is mild she is not interested in a muscle relaxer for now, provided exercises to complete at home, will image if sx not improved at f/u   T70.6 680.1 METABOLIC PANEL, COMPREHENSIVE      HEMOGLOBIN A1C WITH EAG   6. Indigestion    Went back to protonix, pepcid did not control her sx adequately    L43 397.9 METABOLIC PANEL, COMPREHENSIVE      HEMOGLOBIN A1C WITH EAG        Scribed by Sera Holley of [facility], as dictated by Dr. Luis Keller. Current diagnosis and concerns discussed with pt at length. Pt understands risks and benefits or current treatment plan and medications, and accepts the treatment and medication with any possible risks. Pt asks appropriate questions, which were answered. Pt was instructed to call with any concerns or problems. This note will not be viewable in [address]

## 2017-11-01 NOTE — MR AVS SNAPSHOT
Visit Information Date & Time Provider Department Dept. Phone Encounter #  
 11/1/2017  8:45 AM Ashish Salvador, [address] [id number] Follow-up Instructions Return in about 6 months (around 5/1/2018). Upcoming Health Maintenance Date Due  
 GLAUCOMA SCREENING Q2Y 3/17/2018 MEDICARE YEARLY EXAM 5/2/2018 BREAST CANCER SCRN MAMMOGRAM 7/24/2019 COLONOSCOPY 10/13/2024 DTaP/Tdap/Td series (2 - Td) 4/8/2025 Allergies as of 11/1/2017  Review Complete On: 11/1/2017 By: Booker Govea LPN No Known Allergies Current Immunizations  Reviewed on 4/2/2012 Name Date Influenza High Dose Vaccine PF 10/12/2017 Influenza Vaccine 9/1/2014 Influenza Vaccine (Quad) PF 11/1/2016 Influenza Vaccine Split 10/1/2012 Influenza Vaccine Whole 10/1/2011 Pneumococcal Conjugate (PCV-13) 11/3/2016 Pneumococcal Polysaccharide (PPSV-23) 9/2/2014 Tdap 4/8/2015 Zoster Vaccine, Live 1/1/2010 Not reviewed this visit You Were Diagnosed With   
  
 Codes Comments Essential hypertension    -  Primary ICD-10-CM: I10 
ICD-9-CM: 401.9 Pure hypercholesterolemia     ICD-10-CM: E78.00 ICD-9-CM: 272.0 IFG (impaired fasting glucose)     ICD-10-CM: R73.01 
ICD-9-CM: 790.21 CKD (chronic kidney disease) stage 2, GFR 60-89 ml/min     ICD-10-CM: N18.2 ICD-9-CM: 203. 2 Acute left-sided low back pain without sciatica     ICD-10-CM: M54.5 ICD-9-CM: 724.2 Indigestion     ICD-10-CM: K30 ICD-9-CM: 536.8 Vitals BP Pulse Temp Resp Height(growth percentile) Weight(growth percentile) 132/74 (BP 1 Location: Left arm, BP Patient Position: Sitting) 76 97.7 °F (36.5 °C) (Oral) 16 5' 7\" (1.702 m) 146 lb (66.2 kg) SpO2 BMI OB Status Smoking Status 97% 22.87 kg/m2 Hysterectomy Never Smoker Vitals History BMI and BSA Data  Body Mass Index Body Surface Area  
 22.87 kg/m 2 1.77 m 2  
  
  
 Preferred Pharmacy Pharmacy Name Phone [facility], [address] [facility] [address] [phone] Your Updated Medication List  
  
   
This list is accurate as of: 11/1/17  9:11 AM.  Always use your most recent med list. ALLEGRA 180 mg tablet Generic drug:  fexofenadine Take 180 mg by mouth daily. aspirin 81 mg tablet Take 81 mg by mouth daily. atorvastatin 20 mg tablet Commonly known as:  LIPITOR Take 1 Tab by mouth daily. dorzolamide-timolol 22.3-6.8 mg/mL ophthalmic solution Commonly known as:  COSOPT  
  
 famotidine 20 mg tablet Commonly known as:  PEPCID Take 1 Tab by mouth two (2) times a day. felodipine 5 mg 24 hr tablet Commonly known as:  PLENDIL SR Take 1 Tab by mouth daily. pantoprazole 20 mg tablet Commonly known as:  PROTONIX Take 1 Tab by mouth daily. triamterene-hydroCHLOROthiazide 37.5-25 mg per capsule Commonly known as:  [place] Oyster Take 1 Cap by mouth daily. VITAMIN D3 1,000 unit Cap Generic drug:  cholecalciferol Take  by mouth. Prescriptions Sent to Pharmacy Refills  
 felodipine (PLENDIL SR) 5 mg 24 hr tablet 1 Sig: Take 1 Tab by mouth daily. Class: Normal  
 Pharmacy: [address] [facility], [address] [facility] Ph #: [phone] Route: Oral  
 triamterene-hydroCHLOROthiazide (DYAZIDE) 37.5-25 mg per capsule 1 Sig: Take 1 Cap by mouth daily. Class: Normal  
 Pharmacy: [address] [facility], [address] [facility] Ph #: [phone] Route: Oral  
 atorvastatin (LIPITOR) 20 mg tablet 3 Sig: Take 1 Tab by mouth daily. Class: Normal  
 Pharmacy: [address] [facility], [address] [facility] Ph #: [phone] Route: Oral  
 pantoprazole (PROTONIX) 20 mg tablet 1 Sig: Take 1 Tab by mouth daily.   
 Class: Normal  
 Pharmacy: [address], [address] [facility] #: [phone] Route: Oral  
  
Follow-up Instructions Return in about 6 months (around 5/1/2018). To-Do List   
 11/01/2017 Imaging:  US RETROPERITONEUM COMP   
  
 11/08/2017 Lab:  HEMOGLOBIN A1C WITH EAG   
  
 11/08/2017 Lab:  METABOLIC PANEL, COMPREHENSIVE Introducing [facility]! Bola Mayberry introduces thesixtyone patient portal. Now you can access parts of your medical record, email your doctor's office, and request medication refills online. 1. In your internet browser, go to [email or web address] 2. Click on the First Time User? Click Here link in the Sign In box. You will see the New Member Sign Up page. 3. Enter your thesixtyone Access Code exactly as it appears below. You will not need to use this code after youve completed the sign-up process. If you do not sign up before the expiration date, you must request a new code. · [email or web address] Access Code: [email or web address] Expires: 1/30/2018  9:11 AM 
 
4. Enter the last four digits of your Social Security Number (xxxx) and Date of Birth (mm/dd/yyyy) as indicated and click Submit. You will be taken to the next sign-up page. 5. Create a thesixtyone ID. This will be your thesixtyone login ID and cannot be changed, so think of one that is secure and easy to remember. 6. Create a thesixtyone password. You can change your password at any time. 7. Enter your Password Reset Question and Answer. This can be used at a later time if you forget your password. 8. Enter your e-mail address. You will receive e-mail notification when new information is available in [address] 9. Click Sign Up. You can now view and download portions of your medical record. 10. Click the Download Summary menu link to download a portable copy of your medical information.  
 
If you have questions, please visit the Frequently Asked Questions section of the Recommend. Remember, [employer]rog[employer] is NOT to be used for urgent needs. For medical emergencies, dial 911. Now available from your iPhone and Android! Please provide this summary of care documentation to your next provider. Your primary care clinician is listed as Sera Tinajero. If you have any questions after today's visit, please call [phone].

## 2017-11-09 ENCOUNTER — HOSPITAL ENCOUNTER (OUTPATIENT)
Dept: ULTRASOUND IMAGING | Age: 72
Discharge: HOME | End: 2017-11-09
Attending: INTERNAL MEDICINE
Payer: MEDICARE

## 2017-11-09 DIAGNOSIS — N18.2 CKD (CHRONIC KIDNEY DISEASE) STAGE 2, GFR 60-89 ML/MIN: ICD-10-CM

## 2017-11-09 PROCEDURE — 76770 US EXAM ABDO BACK WALL COMP: CPT

## 2018-02-07 ENCOUNTER — HOSPITAL ENCOUNTER (EMERGENCY)
Age: 73
Discharge: HOME | End: 2018-02-07
Attending: EMERGENCY MEDICINE | Admitting: EMERGENCY MEDICINE
Payer: MEDICARE

## 2018-02-07 ENCOUNTER — APPOINTMENT (OUTPATIENT)
Dept: GENERAL RADIOLOGY | Age: 73
End: 2018-02-07
Attending: PHYSICIAN ASSISTANT
Payer: MEDICARE

## 2018-02-07 VITALS
TEMPERATURE: 98.3 F | RESPIRATION RATE: 19 BRPM | HEART RATE: 90 BPM | BODY MASS INDEX: 23.04 KG/M2 | DIASTOLIC BLOOD PRESSURE: 78 MMHG | SYSTOLIC BLOOD PRESSURE: 155 MMHG | WEIGHT: 146.83 LBS | HEIGHT: 67 IN | OXYGEN SATURATION: 100 %

## 2018-02-07 DIAGNOSIS — J10.1 INFLUENZA A: Primary | ICD-10-CM

## 2018-02-07 DIAGNOSIS — J98.01 ACUTE BRONCHOSPASM: ICD-10-CM

## 2018-02-07 LAB
ALBUMIN SERPL-MCNC: 4 G/DL (ref 3.5–5)
ALBUMIN/GLOB SERPL: 0.9 {RATIO} (ref 1.1–2.2)
ALP SERPL-CCNC: 98 U/L (ref 45–117)
ALT SERPL-CCNC: 28 U/L (ref 12–78)
ANION GAP SERPL CALC-SCNC: 4 MMOL/L (ref 5–15)
AST SERPL-CCNC: 17 U/L (ref 15–37)
BASOPHILS # BLD: 0 K/UL (ref 0–0.1)
BASOPHILS NFR BLD: 0 % (ref 0–1)
BILIRUB SERPL-MCNC: 0.6 MG/DL (ref 0.2–1)
BUN SERPL-MCNC: 14 MG/DL (ref 6–20)
BUN/CREAT SERPL: 13 (ref 12–20)
CALCIUM SERPL-MCNC: 9 MG/DL (ref 8.5–10.1)
CHLORIDE SERPL-SCNC: 103 MMOL/L (ref 97–108)
CO2 SERPL-SCNC: 31 MMOL/L (ref 21–32)
CREAT SERPL-MCNC: 1.08 MG/DL (ref 0.55–1.02)
DIFFERENTIAL METHOD BLD: ABNORMAL
EOSINOPHIL # BLD: 0.5 K/UL (ref 0–0.4)
EOSINOPHIL NFR BLD: 6 % (ref 0–7)
ERYTHROCYTE [DISTWIDTH] IN BLOOD BY AUTOMATED COUNT: 15 % (ref 11.5–14.5)
FLUAV AG NPH QL IA: POSITIVE
FLUBV AG NOSE QL IA: NEGATIVE
GLOBULIN SER CALC-MCNC: 4.3 G/DL (ref 2–4)
GLUCOSE SERPL-MCNC: 104 MG/DL (ref 65–100)
HCT VFR BLD AUTO: 39.7 % (ref 35–47)
HGB BLD-MCNC: 12.5 G/DL (ref 11.5–16)
IMM GRANULOCYTES # BLD: 0 K/UL (ref 0–0.04)
IMM GRANULOCYTES NFR BLD AUTO: 0 % (ref 0–0.5)
LYMPHOCYTES # BLD: 0.5 K/UL (ref 0.8–3.5)
LYMPHOCYTES NFR BLD: 7 % (ref 12–49)
MCH RBC QN AUTO: 22.8 PG (ref 26–34)
MCHC RBC AUTO-ENTMCNC: 31.5 G/DL (ref 30–36.5)
MCV RBC AUTO: 72.4 FL (ref 80–99)
MONOCYTES # BLD: 0.5 K/UL (ref 0–1)
MONOCYTES NFR BLD: 6 % (ref 5–13)
NEUTS SEG # BLD: 6 K/UL (ref 1.8–8)
NEUTS SEG NFR BLD: 81 % (ref 32–75)
NRBC # BLD: 0 K/UL (ref 0–0.01)
NRBC BLD-RTO: 0 PER 100 WBC
PLATELET # BLD AUTO: 249 K/UL (ref 150–400)
PMV BLD AUTO: 9.7 FL (ref 8.9–12.9)
POTASSIUM SERPL-SCNC: 3.2 MMOL/L (ref 3.5–5.1)
PROT SERPL-MCNC: 8.3 G/DL (ref 6.4–8.2)
RBC # BLD AUTO: 5.48 M/UL (ref 3.8–5.2)
RBC MORPH BLD: ABNORMAL
SODIUM SERPL-SCNC: 138 MMOL/L (ref 136–145)
WBC # BLD AUTO: 7.5 K/UL (ref 3.6–11)

## 2018-02-07 PROCEDURE — 71046 X-RAY EXAM CHEST 2 VIEWS: CPT

## 2018-02-07 PROCEDURE — 74011250637 HC RX REV CODE- 250/637: Performed by: EMERGENCY MEDICINE

## 2018-02-07 PROCEDURE — 80053 COMPREHEN METABOLIC PANEL: CPT | Performed by: EMERGENCY MEDICINE

## 2018-02-07 PROCEDURE — 74011000250 HC RX REV CODE- 250: Performed by: EMERGENCY MEDICINE

## 2018-02-07 PROCEDURE — 77030029684 HC NEB SM VOL KT MONA -A

## 2018-02-07 PROCEDURE — 87804 INFLUENZA ASSAY W/OPTIC: CPT | Performed by: EMERGENCY MEDICINE

## 2018-02-07 PROCEDURE — 85025 COMPLETE CBC W/AUTO DIFF WBC: CPT | Performed by: EMERGENCY MEDICINE

## 2018-02-07 PROCEDURE — 94640 AIRWAY INHALATION TREATMENT: CPT

## 2018-02-07 PROCEDURE — 99283 EMERGENCY DEPT VISIT LOW MDM: CPT

## 2018-02-07 PROCEDURE — 36415 COLL VENOUS BLD VENIPUNCTURE: CPT | Performed by: EMERGENCY MEDICINE

## 2018-02-07 RX ORDER — CODEINE PHOSPHATE AND GUAIFENESIN 10; 100 MG/5ML; MG/5ML
5 SOLUTION ORAL
Qty: 118 ML | Refills: 0 | Status: SHIPPED | OUTPATIENT
Start: 2018-02-07 | End: 2018-05-01

## 2018-02-07 RX ORDER — OSELTAMIVIR PHOSPHATE 75 MG/1
75 CAPSULE ORAL 2 TIMES DAILY
Qty: 10 CAP | Refills: 0 | Status: SHIPPED | OUTPATIENT
Start: 2018-02-07 | End: 2018-02-12

## 2018-02-07 RX ORDER — POTASSIUM CHLORIDE 20 MEQ/1
40 TABLET, EXTENDED RELEASE ORAL
Status: COMPLETED | OUTPATIENT
Start: 2018-02-07 | End: 2018-02-07

## 2018-02-07 RX ORDER — GUAIFENESIN/DEXTROMETHORPHAN 100-10MG/5
10 SYRUP ORAL
Status: COMPLETED | OUTPATIENT
Start: 2018-02-07 | End: 2018-02-07

## 2018-02-07 RX ORDER — IPRATROPIUM BROMIDE AND ALBUTEROL SULFATE 2.5; .5 MG/3ML; MG/3ML
3 SOLUTION RESPIRATORY (INHALATION) ONCE
Status: COMPLETED | OUTPATIENT
Start: 2018-02-07 | End: 2018-02-07

## 2018-02-07 RX ADMIN — POTASSIUM CHLORIDE 40 MEQ: 20 TABLET, EXTENDED RELEASE ORAL at 09:17

## 2018-02-07 RX ADMIN — GUAIFENESIN AND DEXTROMETHORPHAN 10 ML: 100; 10 SYRUP ORAL at 08:20

## 2018-02-07 RX ADMIN — IPRATROPIUM BROMIDE AND ALBUTEROL SULFATE 3 ML: .5; 3 SOLUTION RESPIRATORY (INHALATION) at 08:21

## 2018-02-07 NOTE — DISCHARGE INSTRUCTIONS

## 2018-02-07 NOTE — ROUTINE PROCESS
Dr Kurtis Ferris reviewed discharge instructions with the patient. The patient verbalized understanding.   Alert and stable to walk at discharge with her

## 2018-02-07 NOTE — ED PROVIDER NOTES
EMERGENCY DEPARTMENT HISTORY AND PHYSICAL EXAM      Date: 2/7/2018  Patient Name: Lili Edmond    History of Presenting Illness     Chief Complaint   Patient presents with    Generalized Body Aches     Ambulatory w/  w/ c/o cough & body aches since yesterday. History Provided By: Patient    HPI: Lili Edmond, 67 y.o. R hand dominant female with PMHx significant for HTN and HLD presents ambualtory to the ED with cc of an intermittent dry cough x one week and predominantly constant moderate generalized myalgias and HA since yesterday. Pt denies taking any medications. She affirms she is able to ambulate. Pt confirms getting the flu vaccine this year. She states all her immunizations are UTD. Pt specifically denies fever, chills, rhinorrhea, SOB, CP, abdominal pain, nausea, vomiting, diarrhea, dysuria, hematuria, and numbness. Social hx: -Tobacco, -EtOH, -Drugs    PCP: Kit Hernandez MD    There are no other complaints, changes, or physical findings at this time. Current Outpatient Prescriptions   Medication Sig Dispense Refill    albuterol sulfate 90 mcg/actuation aepb Take 2 Puffs by inhalation every four (4) hours as needed. Indications: wheeze or cough; please dispense with chamber/spacer 1 Inhaler 0    guaiFENesin-codeine (ROBITUSSIN AC) 100-10 mg/5 mL solution Take 5 mL by mouth three (3) times daily as needed for Cough. Max Daily Amount: 15 mL. Indications: COLD SYMPTOMS, Cough, CAUSES DROWSINESS; DO NOT DRINK,DRIVE, OR USE MACHINERY WHILE TAKING 118 mL 0    oseltamivir (TAMIFLU) 75 mg capsule Take 1 Cap by mouth two (2) times a day for 5 days. Indications: INFLUENZA 10 Cap 0    felodipine (PLENDIL SR) 5 mg 24 hr tablet Take 1 Tab by mouth daily. 90 Tab 1    triamterene-hydroCHLOROthiazide (DYAZIDE) 37.5-25 mg per capsule Take 1 Cap by mouth daily. 90 Cap 1    atorvastatin (LIPITOR) 20 mg tablet Take 1 Tab by mouth daily.  90 Tab 3    pantoprazole (PROTONIX) 20 mg tablet Take 1 Tab by mouth daily. 90 Tab 1    famotidine (PEPCID) 20 mg tablet Take 1 Tab by mouth two (2) times a day. 180 Tab 3    dorzolamide-timolol (COSOPT) 22.3-6.8 mg/mL ophthalmic solution       Cholecalciferol, Vitamin D3, (VITAMIN D3) 1,000 unit cap Take  by mouth.  fexofenadine (ALLEGRA) 180 mg tablet Take 180 mg by mouth daily.  aspirin 81 mg tablet Take 81 mg by mouth daily. Past History     Past Medical History:  Past Medical History:   Diagnosis Date    Dyspepsia and other specified disorders of function of stomach     GERD (gastroesophageal reflux disease)     H/O allergy     Hypercholesterolemia     Hypertension     Indigestion     Other ill-defined conditions(629.89)     elevated cholesterol     Past Surgical History:  Past Surgical History:   Procedure Laterality Date    ABDOMEN SURGERY PROC UNLISTED  14    LAPAROSCOPIC CHOLECYSTECTOMY with GRAMS    HX  SECTION      HX CHOLECYSTECTOMY  14    lap mando with cholangiogram    HX HYSTERECTOMY      HX OTHER SURGICAL  14    ERCPwith biliary sphincterotomy CBD balloon sweeps      HX TUBAL LIGATION       Family History:  Family History   Problem Relation Age of Onset    Hypertension Mother     Cancer Brother      prostate     Social History:  Social History   Substance Use Topics    Smoking status: Never Smoker    Smokeless tobacco: Never Used    Alcohol use No     Allergies:  No Known Allergies    Review of Systems   Review of Systems   Constitutional: Negative for fatigue and fever. HENT: Negative for ear pain, rhinorrhea and sore throat. Eyes: Negative for pain, redness and visual disturbance. Respiratory: Positive for cough. Negative for shortness of breath. Cardiovascular: Negative for chest pain and palpitations. Gastrointestinal: Negative for abdominal pain, diarrhea, nausea and vomiting. Genitourinary: Negative for dysuria, frequency and urgency.    Musculoskeletal: Positive for myalgias (generalized). Negative for back pain, gait problem, neck pain and neck stiffness. Skin: Negative for rash and wound. Neurological: Positive for headaches. Negative for dizziness, weakness, light-headedness and numbness. Physical Exam   Physical Exam   Nursing note and vitals reviewed. General appearance: non-toxic  Eyes: PERRL, EOMI, conjunctiva normal, anicteric sclera  HEENT: mucous membranes moist, oropharynx is clear, neck supple  Pulmonary: scant expiratory wheezes; good air exchange, no respiratory distress, occasional coughing on exam  Cardiac: normal rate and regular rhythm, no murmurs, gallops, or rubs, 2+DP pulses, 2+ radial pulses  Abdomen: soft, nontender, nondistended, bowel sounds present  MSK: no pre-tibial edema  Neuro: Alert, answers questions appropriately, strength grossly intact, gait normal  Skin: capillary refill brisk    Diagnostic Study Results     Labs -     Recent Results (from the past 12 hour(s))   INFLUENZA A & B AG (RAPID TEST)    Collection Time: 02/07/18  8:11 AM   Result Value Ref Range    Influenza A Antigen POSITIVE (A) NEG      Influenza B Antigen NEGATIVE  NEG     CBC WITH AUTOMATED DIFF    Collection Time: 02/07/18  8:11 AM   Result Value Ref Range    WBC 7.5 3.6 - 11.0 K/uL    RBC 5.48 (H) 3.80 - 5.20 M/uL    HGB 12.5 11.5 - 16.0 g/dL    HCT 39.7 35.0 - 47.0 %    MCV 72.4 (L) 80.0 - 99.0 FL    MCH 22.8 (L) 26.0 - 34.0 PG    MCHC 31.5 30.0 - 36.5 g/dL    RDW 15.0 (H) 11.5 - 14.5 %    PLATELET 259 883 - 861 K/uL    MPV 9.7 8.9 - 12.9 FL    NRBC 0.0 0  WBC    ABSOLUTE NRBC 0.00 0.00 - 0.01 K/uL    NEUTROPHILS 81 (H) 32 - 75 %    LYMPHOCYTES 7 (L) 12 - 49 %    MONOCYTES 6 5 - 13 %    EOSINOPHILS 6 0 - 7 %    BASOPHILS 0 0 - 1 %    IMMATURE GRANULOCYTES 0 0.0 - 0.5 %    ABS. NEUTROPHILS 6.0 1.8 - 8.0 K/UL    ABS. LYMPHOCYTES 0.5 (L) 0.8 - 3.5 K/UL    ABS. MONOCYTES 0.5 0.0 - 1.0 K/UL    ABS. EOSINOPHILS 0.5 (H) 0.0 - 0.4 K/UL    ABS.  BASOPHILS 0.0 0.0 - 0.1 K/UL    ABS. IMM. GRANS. 0.0 0.00 - 0.04 K/UL    DF MANUAL      RBC COMMENTS NORMOCYTIC, NORMOCHROMIC     METABOLIC PANEL, COMPREHENSIVE    Collection Time: 02/07/18  8:11 AM   Result Value Ref Range    Sodium 138 136 - 145 mmol/L    Potassium 3.2 (L) 3.5 - 5.1 mmol/L    Chloride 103 97 - 108 mmol/L    CO2 31 21 - 32 mmol/L    Anion gap 4 (L) 5 - 15 mmol/L    Glucose 104 (H) 65 - 100 mg/dL    BUN 14 6 - 20 MG/DL    Creatinine 1.08 (H) 0.55 - 1.02 MG/DL    BUN/Creatinine ratio 13 12 - 20      GFR est AA >60 >60 ml/min/1.73m2    GFR est non-AA 50 (L) >60 ml/min/1.73m2    Calcium 9.0 8.5 - 10.1 MG/DL    Bilirubin, total 0.6 0.2 - 1.0 MG/DL    ALT (SGPT) 28 12 - 78 U/L    AST (SGOT) 17 15 - 37 U/L    Alk. phosphatase 98 45 - 117 U/L    Protein, total 8.3 (H) 6.4 - 8.2 g/dL    Albumin 4.0 3.5 - 5.0 g/dL    Globulin 4.3 (H) 2.0 - 4.0 g/dL    A-G Ratio 0.9 (L) 1.1 - 2.2         Radiologic Studies -     CXR Results  (Last 48 hours)               02/07/18 0753  XR CHEST PA LAT Final result    Impression:  IMPRESSION: No acute cardiopulmonary disease. Narrative: Indication: Cough, generalized body aches since yesterday. Exam: PA and lateral views of the chest.       There is no prior study for direct comparison. Findings: Cardiomediastinal silhouette is within normal limits. Lungs are clear   bilaterally. Pleural spaces are normal. Osseous structures are intact. Medical Decision Making   I am the first provider for this patient. I reviewed the vital signs, available nursing notes, past medical history, past surgical history, family history and social history. Vital Signs-Reviewed the patient's vital signs. Patient Vitals for the past 12 hrs:   Temp Pulse Resp BP SpO2   02/07/18 0708 98.3 °F (36.8 °C) 90 19 155/78 100 %     Records Reviewed:  Old Medical Records    Provider Notes (Medical Decision Making):   DDx: PNA, viral syndrome, bronchitis     ED Course:   Initial assessment performed. The patients presenting problems have been discussed, and they are in agreement with the care plan formulated and outlined with them. I have encouraged them to ask questions as they arise throughout their visit. Critical Care Time:   0    Disposition:  DISCHARGE NOTE  9:22 AM  The patient has been re-evaluated and is ready for discharge. Reviewed available results with patient. Counseled pt on diagnosis and care plan. Pt has expressed understanding, and all questions have been answered. Pt agrees with plan and agrees to F/U as recommended, or return to the ED if their sxs worsen. Discharge instructions have been provided and explained to the pt, along with reasons to return to the ED. PLAN:  1. Discharge Medication List as of 2/7/2018  9:21 AM      START taking these medications    Details   albuterol sulfate 90 mcg/actuation aepb Take 2 Puffs by inhalation every four (4) hours as needed. Indications: wheeze or cough; please dispense with chamber/spacer, Normal, Disp-1 Inhaler, R-0      guaiFENesin-codeine (ROBITUSSIN AC) 100-10 mg/5 mL solution Take 5 mL by mouth three (3) times daily as needed for Cough. Max Daily Amount: 15 mL. Indications: COLD SYMPTOMS, Cough, CAUSES DROWSINESS; DO NOT DRINK,DRIVE, OR USE MACHINERY WHILE TAKING, Print, Disp-118 mL, R-0      oseltamivir (TAMIFLU) 75 mg capsule Take 1 Cap by mouth two (2) times a day for 5 days. Indications: INFLUENZA, Normal, Disp-10 Cap, R-0         CONTINUE these medications which have NOT CHANGED    Details   felodipine (PLENDIL SR) 5 mg 24 hr tablet Take 1 Tab by mouth daily. , Normal, Disp-90 Tab, R-1      triamterene-hydroCHLOROthiazide (DYAZIDE) 37.5-25 mg per capsule Take 1 Cap by mouth daily. , Normal, Disp-90 Cap, R-1      atorvastatin (LIPITOR) 20 mg tablet Take 1 Tab by mouth daily. , Normal, Disp-90 Tab, R-3      pantoprazole (PROTONIX) 20 mg tablet Take 1 Tab by mouth daily. , Normal, Disp-90 Tab, R-1      famotidine (PEPCID) 20 mg tablet Take 1 Tab by mouth two (2) times a day., Normal, Disp-180 Tab, R-3      dorzolamide-timolol (COSOPT) 22.3-6.8 mg/mL ophthalmic solution Historical Med      Cholecalciferol, Vitamin D3, (VITAMIN D3) 1,000 unit cap Take  by mouth. Historical Med      fexofenadine (ALLEGRA) 180 mg tablet Take 180 mg by mouth daily. Historical Med, 180 mg      aspirin 81 mg tablet Take 81 mg by mouth daily. Historical Med, 81 mg           2. Follow-up Information     Follow up With Details Comments Contact Info    Wilma Crowley MD Schedule an appointment as soon as possible for a visit in 2 days  [address] [place] [address]  [phone]      [facility] EMERGENCY DEPT Go in 1 day If symptoms worsen [address] [place] [address]  [phone]        Return to ED if worse     Diagnosis     Clinical Impression:   1. Influenza A    2. Acute bronchospasm          Attestation: This note is prepared by Duane Orange, acting as Scribe for Magy Silverio. Lamont Johns MD.    The scribe's documentation has been prepared under my direction and personally reviewed by me in its entirety. I confirm that the note above accurately reflects all work, treatment, procedures, and medical decision making performed by me. Magy Silverio.  Lamont Johns MD

## 2018-02-08 ENCOUNTER — PATIENT OUTREACH (OUTPATIENT)
Dept: INTERNAL MEDICINE CLINIC | Age: 73
End: 2018-02-08

## 2018-02-08 NOTE — PROGRESS NOTES
[address] [facility]  Patient referred to this NN via Referral from ED referral.    Most recent HIPAA form in the patient's chart (dated 11/1/17) was reviewed; authorized individual(s) listed on HIPAA form include Nilda Ip.      - Patient with ED visit to [facility] on 2/7/18 with diagnosis of Influenza A.  - Per notes, patient was discharged from ED to home. - Patient's most recent Office Visit with PCP: 11/1/2017      NN follow-up call  NN contacted the patient today to complete NN Post-ED discharge assessment. Two patient identifiers verified. NN introduced self to the patient, including NN role and purpose of NN follow-up phone call; patient verbalizes understanding. NN inquired about the patient's current condition and how the patient is feeling; patient states, \"The medicine must be working because I'm not coughing as much. \"     Discussed red flags and reasons to follow up. Reports filled prescriptions. States inhaler is not working correctly. \"My  is taking the inhaler to be checked by the pharmacist today. \"  Denies increased fever,chest pain or shortness of breath. Medication:   - New Prescription(s) at ED Discharge: yes. - Change(s) to existing Medication(s) at ED Discharge: no  - Medication(s) discontinued at ED Discharge: no  - Able to fill/pickup new medications without difficulty: Yes.  - Any Questions/Concerns regarding new Medication(s) and/or Medication Change(s): Yes. Unable to complete Med Rec during this call. PLAN  - ED f/u appointment scheduled with PCP: no  - patient to contact this NN and/or PCP office with any questions/concerns.  -Notified of availability of PCP on-call physician after-hours and on the weekends for non-emergent/non-life threatening medical questions/concerns. -Goals: Take all medications as directed. Rest and increased fluids. Follow up for shortness of breath,chest pain or fever.       NN will route this encounter to [employer] Angelica Guerin MD for notification/review.

## 2018-02-09 ENCOUNTER — OFFICE VISIT (OUTPATIENT)
Dept: INTERNAL MEDICINE CLINIC | Age: 73
End: 2018-02-09

## 2018-02-09 VITALS
SYSTOLIC BLOOD PRESSURE: 111 MMHG | HEART RATE: 91 BPM | WEIGHT: 146 LBS | RESPIRATION RATE: 16 BRPM | HEIGHT: 67 IN | DIASTOLIC BLOOD PRESSURE: 70 MMHG | TEMPERATURE: 98.2 F | BODY MASS INDEX: 22.91 KG/M2 | OXYGEN SATURATION: 99 %

## 2018-02-09 DIAGNOSIS — J11.1 INFLUENZA: Primary | ICD-10-CM

## 2018-02-09 DIAGNOSIS — I10 ESSENTIAL HYPERTENSION: ICD-10-CM

## 2018-02-09 DIAGNOSIS — E87.6 HYPOKALEMIA: ICD-10-CM

## 2018-02-09 DIAGNOSIS — F51.01 PRIMARY INSOMNIA: ICD-10-CM

## 2018-02-09 DIAGNOSIS — G44.83 PRIMARY COUGH HEADACHE: ICD-10-CM

## 2018-02-09 RX ORDER — TRAZODONE HYDROCHLORIDE 50 MG/1
50 TABLET ORAL
Qty: 30 TAB | Refills: 0 | Status: SHIPPED | OUTPATIENT
Start: 2018-02-09 | End: 2018-05-01

## 2018-02-09 RX ORDER — IBUPROFEN 800 MG/1
800 TABLET ORAL
Qty: 20 TAB | Refills: 0 | Status: SHIPPED | OUTPATIENT
Start: 2018-02-09 | End: 2018-05-01

## 2018-02-09 NOTE — PROGRESS NOTES
HISTORY OF PRESENT ILLNESS  Tura Saint is a 67 y.o. female. HPI   Last here 11/1/17. Pt is here for acute care. Pt presents with her  who provides some of the hx. Pt c/o cough x 1 week  Pt states that her sx exacerbated on 2/7/18  Pt went to the ER for mild fever (99.4 deg. F), nonproductive cough and myalgias on 2/7/18  Reviewed labs 2/18: low K, not anemic, kidney nl  Pt tested positive for Flu A and was started on tamiflu  Pt started taking this medication x 2 days  Today, pt c/o similar sx, as well as insomnia and HA in the occipital region  Pt states that she normally sleeps for a few hours  Pt tried a cough syrup with no improvement to sx  Discussed her sx improving over the next few days  Advised her to eat foods rich in K  Advised her to stay home for 1 week  Ordered motrin 800mg  Ordered trazodone    Patient Active Problem List    Diagnosis Date Noted    Angioedema 08/25/2016    ACP (advance care planning) 12/08/2015    Prediabetes 04/08/2015    S/P laparoscopic cholecystectomy 05/11/2014    Hyperlipidemia 04/01/2013    CAD (coronary artery disease) 10/01/2012    H/O allergy     Hypertension     Indigestion      Current Outpatient Prescriptions   Medication Sig Dispense Refill    albuterol sulfate 90 mcg/actuation aepb Take 2 Puffs by inhalation every four (4) hours as needed. Indications: wheeze or cough; please dispense with chamber/spacer 1 Inhaler 0    guaiFENesin-codeine (ROBITUSSIN AC) 100-10 mg/5 mL solution Take 5 mL by mouth three (3) times daily as needed for Cough. Max Daily Amount: 15 mL. Indications: COLD SYMPTOMS, Cough, CAUSES DROWSINESS; DO NOT DRINK,DRIVE, OR USE MACHINERY WHILE TAKING 118 mL 0    oseltamivir (TAMIFLU) 75 mg capsule Take 1 Cap by mouth two (2) times a day for 5 days. Indications: INFLUENZA 10 Cap 0    felodipine (PLENDIL SR) 5 mg 24 hr tablet Take 1 Tab by mouth daily.  90 Tab 1    triamterene-hydroCHLOROthiazide (DYAZIDE) 37.5-25 mg per capsule Take 1 Cap by mouth daily. 90 Cap 1    atorvastatin (LIPITOR) 20 mg tablet Take 1 Tab by mouth daily. 90 Tab 3    pantoprazole (PROTONIX) 20 mg tablet Take 1 Tab by mouth daily. 90 Tab 1    famotidine (PEPCID) 20 mg tablet Take 1 Tab by mouth two (2) times a day. (Patient taking differently: Take 20 mg by mouth daily. ) 180 Tab 3    dorzolamide-timolol (COSOPT) 22.3-6.8 mg/mL ophthalmic solution       Cholecalciferol, Vitamin D3, (VITAMIN D3) 1,000 unit cap Take  by mouth.  fexofenadine (ALLEGRA) 180 mg tablet Take 180 mg by mouth daily.  aspirin 81 mg tablet Take 81 mg by mouth daily. Past Surgical History:   Procedure Laterality Date    ABDOMEN SURGERY PROC UNLISTED  14    LAPAROSCOPIC CHOLECYSTECTOMY with GRAMS    HX  SECTION      HX CHOLECYSTECTOMY  14    lap mando with cholangiogram    HX HYSTERECTOMY      HX OTHER SURGICAL  14    ERCPwith biliary sphincterotomy CBD balloon sweeps      HX TUBAL LIGATION        Lab Results  Component Value Date/Time   WBC 7.5 2018 08:11 AM   HGB 12.5 2018 08:11 AM   HCT 39.7 2018 08:11 AM   PLATELET 470 4227 08:11 AM   MCV 72.4 (L) 2018 08:11 AM     Lab Results  Component Value Date/Time   Cholesterol, total 142 10/25/2017 09:18 AM   HDL Cholesterol 51 10/25/2017 09:18 AM   LDL, calculated 75 10/25/2017 09:18 AM   Triglyceride 79 10/25/2017 09:18 AM     Lab Results  Component Value Date/Time   GFR est non-AA 50 (L) 2018 08:11 AM   GFR est AA >60 2018 08:11 AM   Creatinine 1.08 (H) 2018 08:11 AM   BUN 14 2018 08:11 AM   Sodium 138 2018 08:11 AM   Potassium 3.2 (L) 2018 08:11 AM   Chloride 103 2018 08:11 AM   CO2 31 2018 08:11 AM        Review of Systems   Constitutional: Positive for fever. Respiratory: Positive for cough. Negative for sputum production and shortness of breath. Cardiovascular: Negative for chest pain.    Musculoskeletal: Positive for myalgias. Neurological: Positive for headaches. Psychiatric/Behavioral: The patient has insomnia. Physical Exam   Constitutional: She is oriented to person, place, and time. She appears well-developed and well-nourished. No distress. HENT:   Head: Normocephalic and atraumatic. Right Ear: External ear normal.   Left Ear: External ear normal.   Mouth/Throat: Oropharynx is clear and moist. No oropharyngeal exudate. Fluid behind BL TM  Mild erythema   Eyes: Conjunctivae and EOM are normal. Right eye exhibits no discharge. Left eye exhibits no discharge. Neck: Normal range of motion. Neck supple. Cardiovascular: Normal rate, regular rhythm and normal heart sounds. Exam reveals no gallop and no friction rub. No murmur heard. Pulmonary/Chest: Effort normal and breath sounds normal. No respiratory distress. She has no wheezes. She has no rales. She exhibits no tenderness. Musculoskeletal: Normal range of motion. She exhibits no edema, tenderness or deformity. Lymphadenopathy:     She has no cervical adenopathy. Neurological: She is alert and oriented to person, place, and time. Coordination normal.   Skin: Skin is warm and dry. No rash noted. She is not diaphoretic. No erythema. No pallor. Psychiatric: She has a normal mood and affect. Her behavior is normal.       ASSESSMENT and PLAN    ICD-10-CM ICD-9-CM    1. Influenza    On tamiflu, will take 5 day course   J11.1 487.1    2. Primary insomnia    Will give trazodone to help with sleep   F51.01 307.42    3. Primary cough headache  HA related to influenza, will use motrin 800mg prn   G44.83 339.83    4. Essential hypertension    Well-controled   I10 401.9    5. Hypokalemia    Mildly low in ER, prev levels were good, will have her increase K rich foods and will repeat blood work at her f/u in May   E87.6 276.8         Scribed by [facility] of [address] [place] [address] [address], as dictated by Dr. Herberth Araya.     Current diagnosis and concerns discussed with pt at length. Pt understands risks and benefits or current treatment plan and medications, and accepts the treatment and medication with any possible risks. Pt asks appropriate questions, which were answered. Pt was instructed to call with any concerns or problems. This note will not be viewable in [address]

## 2018-02-09 NOTE — MR AVS SNAPSHOT
[address] 
[phone] Patient: Annelise Chester MRN: [id number] :[id number] Visit Information Date & Time Provider Department Dept. Phone Encounter #  
 2018 10:45 AM Meri Levin, [facility] [id number] Follow-up Instructions Return if symptoms worsen or fail to improve. Your Appointments 2018  8:45 AM  
ROUTINE CARE with Meri Levin, [address] [place] [address],[address] [facility] Appt Note: 6 month follow up  
 [facility] [address] [phone]  
[address] [facility]  [address] Upcoming Health Maintenance Date Due  
 GLAUCOMA SCREENING Q2Y 3/17/2018 MEDICARE YEARLY EXAM 2018 BREAST CANCER SCRN MAMMOGRAM 2019 COLONOSCOPY 10/13/2024 DTaP/Tdap/Td series (2 - Td) 2025 Allergies as of 2018  Review Complete On: 2018 By: Meri Levin MD  
 No Known Allergies Current Immunizations  Reviewed on 2012 Name Date Influenza High Dose Vaccine PF 10/12/2017 Influenza Vaccine 2014 Influenza Vaccine (Quad) PF 2016 Influenza Vaccine Split 10/1/2012 Influenza Vaccine Whole 10/1/2011 Pneumococcal Conjugate (PCV-13) 11/3/2016 Pneumococcal Polysaccharide (PPSV-23) 2014 Tdap 2015 Zoster Vaccine, Live 2010 Not reviewed this visit You Were Diagnosed With   
  
 Codes Comments Influenza    -  Primary ICD-10-CM: J11.1 ICD-9-CM: 325.1 Primary insomnia     ICD-10-CM: F51.01 
ICD-9-CM: 307.42 Primary cough headache     ICD-10-CM: G44.83 ICD-9-CM: 339.83 Essential hypertension     ICD-10-CM: I10 
ICD-9-CM: 401.9 Hypokalemia     ICD-10-CM: E87.6 ICD-9-CM: 276.8 Vitals BP Pulse Temp Resp Height(growth percentile) Weight(growth percentile) 111/70 (BP 1 Location: Left arm, BP Patient Position: Sitting) 91 98.2 °F (36.8 °C) (Oral) 16 5' 7\" (1.702 m) 146 lb (66.2 kg) SpO2 BMI OB Status Smoking Status 99% 22.87 kg/m2 Hysterectomy Never Smoker Vitals History BMI and BSA Data Body Mass Index Body Surface Area  
 22.87 kg/m 2 1.77 m 2 Preferred Pharmacy Pharmacy Name Phone [phone], [address] [place] [address] [phone] Your Updated Medication List  
  
   
This list is accurate as of: 2/9/18 10:52 AM.  Always use your most recent med list.  
  
  
  
  
 albuterol sulfate 90 mcg/actuation Aepb Take 2 Puffs by inhalation every four (4) hours as needed. Indications: wheeze or cough; please dispense with chamber/spacer ALLEGRA 180 mg tablet Generic drug:  fexofenadine Take 180 mg by mouth daily. aspirin 81 mg tablet Take 81 mg by mouth daily. atorvastatin 20 mg tablet Commonly known as:  LIPITOR Take 1 Tab by mouth daily. dorzolamide-timolol 22.3-6.8 mg/mL ophthalmic solution Commonly known as:  COSOPT  
  
 famotidine 20 mg tablet Commonly known as:  PEPCID Take 1 Tab by mouth two (2) times a day. felodipine 5 mg 24 hr tablet Commonly known as:  PLENDIL SR Take 1 Tab by mouth daily. guaiFENesin-codeine 100-10 mg/5 mL solution Commonly known as:  ROBITUSSIN AC Take 5 mL by mouth three (3) times daily as needed for Cough. Max Daily Amount: 15 mL. Indications: COLD SYMPTOMS, Cough, CAUSES DROWSINESS; DO NOT DRINK,DRIVE, OR USE MACHINERY WHILE TAKING  
  
 ibuprofen 800 mg tablet Commonly known as:  MOTRIN Take 1 Tab by mouth every eight (8) hours as needed for Pain. oseltamivir 75 mg capsule Commonly known as:  TAMIFLU Take 1 Cap by mouth two (2) times a day for 5 days. Indications: INFLUENZA  
  
 pantoprazole 20 mg tablet Commonly known as:  PROTONIX Take 1 Tab by mouth daily. traZODone 50 mg tablet Commonly known as:  Paul Dana Take 1 Tab by mouth nightly. triamterene-hydroCHLOROthiazide 37.5-25 mg per capsule Commonly known as:  Roxie Blight Take 1 Cap by mouth daily. VITAMIN D3 1,000 unit Cap Generic drug:  cholecalciferol Take  by mouth. Prescriptions Sent to Pharmacy Refills  
 traZODone (DESYREL) 50 mg tablet 0 Sig: Take 1 Tab by mouth nightly. Class: Normal  
 Pharmacy: [facility] [address] [place] [address] Ph #: [phone] Route: Oral  
 ibuprofen (MOTRIN) 800 mg tablet 0 Sig: Take 1 Tab by mouth every eight (8) hours as needed for Pain. Class: Normal  
 Pharmacy: [facility] [address] Ph #: [phone] Route: Oral  
  
Follow-up Instructions Return if symptoms worsen or fail to improve. Patient Instructions Eat potassium containing foods Introducing \[facility]\"" & HEALTH SERVICES! Dank Abebe introduces [employer] patient portal. Now you can access parts of your medical record, email your doctor's office, and request medication refills online. 1. In your internet browser, go to [email or web address] 2. Click on the First Time User? Click Here link in the Sign In box. You will see the New Member Sign Up page. 3. Enter your [employer] Access Code exactly as it appears below. You will not need to use this code after youve completed the sign-up process. If you do not sign up before the expiration date, you must request a new code. · [employer] Access Code: [email or web address] Expires: 5/8/2018  9:21 AM 
 
4. Enter the last four digits of your Social Security Number (xxxx) and Date of Birth (mm/dd/yyyy) as indicated and click Submit. You will be taken to the next sign-up page. 5. Create a WITOI ID. This will be your [employer] login ID and cannot be changed, so think of one that is secure and easy to remember. 6. Create a [employer] password. You can change your password at any time. 7. Enter your Password Reset Question and Answer. This can be used at a later time if you forget your password. 8. Enter your e-mail address. You will receive e-mail notification when new information is available in [address] 9. Click Sign Up. You can now view and download portions of your medical record. 10. Click the Download Summary menu link to download a portable copy of your medical information. If you have questions, please visit the Frequently Asked Questions section of the [employer] website. Remember, W[employer]I is NOT to be used for urgent needs. For medical emergencies, dial 911. Now available from your iPhone and Android! Please provide this summary of care documentation to your next provider. Your primary care clinician is listed as Abbey Raman. If you have any questions after today's visit, please call [phone].

## 2018-04-26 ENCOUNTER — APPOINTMENT (OUTPATIENT)
Dept: INTERNAL MEDICINE CLINIC | Age: 73
End: 2018-04-26

## 2018-04-26 DIAGNOSIS — N18.2 CKD (CHRONIC KIDNEY DISEASE) STAGE 2, GFR 60-89 ML/MIN: ICD-10-CM

## 2018-04-26 DIAGNOSIS — K30 INDIGESTION: ICD-10-CM

## 2018-04-26 DIAGNOSIS — I10 ESSENTIAL HYPERTENSION: ICD-10-CM

## 2018-04-26 DIAGNOSIS — R73.01 IFG (IMPAIRED FASTING GLUCOSE): ICD-10-CM

## 2018-04-26 DIAGNOSIS — E78.00 PURE HYPERCHOLESTEROLEMIA: ICD-10-CM

## 2018-04-26 DIAGNOSIS — M54.50 ACUTE LEFT-SIDED LOW BACK PAIN WITHOUT SCIATICA: ICD-10-CM

## 2018-04-27 LAB
ALBUMIN SERPL-MCNC: 4.4 G/DL (ref 3.5–4.8)
ALBUMIN/GLOB SERPL: 1.4 {RATIO} (ref 1.2–2.2)
ALP SERPL-CCNC: 68 IU/L (ref 39–117)
ALT SERPL-CCNC: 17 IU/L (ref 0–32)
AST SERPL-CCNC: 15 IU/L (ref 0–40)
BILIRUB SERPL-MCNC: 0.3 MG/DL (ref 0–1.2)
BUN SERPL-MCNC: 17 MG/DL (ref 8–27)
BUN/CREAT SERPL: 16 (ref 12–28)
CALCIUM SERPL-MCNC: 9.7 MG/DL (ref 8.7–10.3)
CHLORIDE SERPL-SCNC: 101 MMOL/L (ref 96–106)
CO2 SERPL-SCNC: 27 MMOL/L (ref 18–29)
CREAT SERPL-MCNC: 1.08 MG/DL (ref 0.57–1)
EST. AVERAGE GLUCOSE BLD GHB EST-MCNC: 117 MG/DL
GFR SERPLBLD CREATININE-BSD FMLA CKD-EPI: 51 ML/MIN/1.73
GFR SERPLBLD CREATININE-BSD FMLA CKD-EPI: 59 ML/MIN/1.73
GLOBULIN SER CALC-MCNC: 3.1 G/DL (ref 1.5–4.5)
GLUCOSE SERPL-MCNC: 103 MG/DL (ref 65–99)
HBA1C MFR BLD: 5.7 % (ref 4.8–5.6)
POTASSIUM SERPL-SCNC: 3.5 MMOL/L (ref 3.5–5.2)
PROT SERPL-MCNC: 7.5 G/DL (ref 6–8.5)
SODIUM SERPL-SCNC: 143 MMOL/L (ref 134–144)

## 2018-04-30 ENCOUNTER — DOCUMENTATION ONLY (OUTPATIENT)
Dept: INTERNAL MEDICINE CLINIC | Age: 73
End: 2018-04-30

## 2018-04-30 NOTE — PROGRESS NOTES
Medicare Part B Preventive Services Guidelines/Limitations Date last completed and Frequency Due Date   Bone Mass Measurement  (age 72 & older, biennial) Requires diagnosis related to osteoporosis or estrogen deficiency. Biennial benefit unless patient has history of long-term glucocorticoid tx or baseline is needed because initial test was by other method Completed 3/28/16      Recommended every 2 years Due now   Cardiovascular Screening Blood Tests (every 5 years)  Total cholesterol, HDL, Triglycerides Order as a panel if possible Completed 10/2017      Recommended annually Due 10/2018   Colorectal Cancer Screening  -Fecal occult blood test (annual)  -Flexible sigmoidoscopy (5y)  -Screening colonoscopy (10y)  -Barium Enema    Completed 10/07/14 with Dr. Jaky Monk      Recommended every 5 years  Due 10/2019   Counseling to Prevent Tobacco Use (up to 8 sessions per year)  - Counseling greater than 3 and up to 10 minutes  - Counseling greater than 10 minutes Patients must be asymptomatic of tobacco-related conditions to receive as preventive service N/A N/A   Diabetes Screening Tests (at least every 3 years, Medicare covers annually or at 6-month intervals for prediabetic patients)      Fasting blood sugar (FBS) or glucose tolerance test (GTT) Patient must be diagnosed with one of the following:  -Hypertension, Dyslipidemia, obesity, previous impaired FBS or GTT  Or any two of the following: overweight, FH of diabetes, age ? 72, history of gestational diabetes, birth of baby weighing more than 9 pounds Completed 4/2018 with A1C 5.7      Recommended every 3-6 months for Pre-Diabetics and Diabetics Due 7/2017-10/2018   Diabetes Self-Management Training (DSMT) (no USPSTF recommendation) Requires referral by treating physician for patient with diabetes or renal disease. 10 hours of initial DSMT session of no less than 30 minutes each in a continuous 12-month period. 2 hours of follow-up DSMT in subsequent years.  N/A N/A Glaucoma Screening (no USPSTF recommendation) Diabetes mellitus, family history, , age 48 or over,  American, age 72 or over Completed 3/2017 with Dr. Codey Kapoor  Recommended annually Due now   Human Immunodeficiency Virus (HIV) Screening (annually for increased risk patients)  HIV-1 and HIV-2 by EIA, CRISTY, rapid antibody test, or oral mucosa transudate Patient must be at increased risk for HIV infection per USPSTF guidelines or pregnant. Tests covered annually for patients at increased risk. Pregnant patients may receive up to 3 test during pregnancy. N/A N/A   Medical Nutrition Therapy (MNT) (for diabetes or renal disease not recommended schedule) Requires referral by treating physician for patient with diabetes or renal disease. Can be provided in same year as diabetes self-management training (DSMT), and CMS recommends medical nutrition therapy take place after DSMT. Up to 3 hours for initial year and 2 hours in subsequent years. N/A N/A   Prostate Cancer Screening (annually up to age 76)  - Digital rectal exam (LAYLA)  - Prostate specific antigen (PSA) Annually (age 48 or over), LAYLA not paid separately when covered E/M service is provided on same date N/A N/A   Seasonal Influenza Vaccination (annually)    Completed 10/2017      Recommended annually Due Fall 2018   Pneumococcal Vaccination (once after 72)    Pneumococcal 23 - 9/02/2014      Prevnar 13 -  11/03/2016     Both recommended once over the age of 72 Completed            Completed   Hepatitis B Vaccinations (if medium/high risk) Medium/high risk factors: End-stage renal disease,  Hemophiliacs who received Factor VIII or IX concentrates, Clients of institutions for the mentally retarded, Persons who live in the same house as a HepB virus carrier, Homosexual men, Illicit injectable drug abusers. N/A N/A   Screening Mammography (biennial age 54-69)?  Annually (age 36 or over) Completed 7/2017      Recommended annually Due 7/2018 Screening Pap Tests and Pelvic Examination (up to age 79 and after 79 if unknown history or abnormal study last 10 years) Every 24 months except high risk Completed 7/2015 with Dr. Paula Weiss  Recommended every other year  Due now   Ultrasound Screening for Abdominal Aortic Aneurysm (AAA) (once) Patient must be referred through [facility] and not have had a screening for abdominal aortic aneurysm before under Medicare.  Limited to patients who meet one of the following criteria:  - Men who are 73-68 years old and have smoked more than 100 cigarettes in their lifetime.  -Anyone with a FH of AAA  -Anyone recommended for screening by USPSTF Completed US retroperitoneum 11/2017 - negative Completed

## 2018-05-01 ENCOUNTER — OFFICE VISIT (OUTPATIENT)
Dept: INTERNAL MEDICINE CLINIC | Age: 73
End: 2018-05-01

## 2018-05-01 VITALS
DIASTOLIC BLOOD PRESSURE: 66 MMHG | OXYGEN SATURATION: 97 % | HEART RATE: 79 BPM | SYSTOLIC BLOOD PRESSURE: 106 MMHG | WEIGHT: 143 LBS | BODY MASS INDEX: 22.44 KG/M2 | RESPIRATION RATE: 16 BRPM | TEMPERATURE: 97.9 F | HEIGHT: 67 IN

## 2018-05-01 DIAGNOSIS — L21.9 SEBORRHEIC DERMATITIS: ICD-10-CM

## 2018-05-01 DIAGNOSIS — R73.01 IFG (IMPAIRED FASTING GLUCOSE): ICD-10-CM

## 2018-05-01 DIAGNOSIS — R59.9 LYMPH NODE ENLARGEMENT: ICD-10-CM

## 2018-05-01 DIAGNOSIS — I10 ESSENTIAL HYPERTENSION: Primary | ICD-10-CM

## 2018-05-01 DIAGNOSIS — K21.9 GASTROESOPHAGEAL REFLUX DISEASE WITHOUT ESOPHAGITIS: ICD-10-CM

## 2018-05-01 DIAGNOSIS — Z00.00 MEDICARE ANNUAL WELLNESS VISIT, SUBSEQUENT: ICD-10-CM

## 2018-05-01 DIAGNOSIS — M89.9 BONE DISEASE: ICD-10-CM

## 2018-05-01 DIAGNOSIS — L20.84 INTRINSIC ECZEMA: ICD-10-CM

## 2018-05-01 RX ORDER — KETOCONAZOLE 2 G/100G
AEROSOL, FOAM TOPICAL 2 TIMES DAILY
Qty: 100 G | Refills: 0 | Status: SHIPPED | OUTPATIENT
Start: 2018-05-01

## 2018-05-01 NOTE — PROGRESS NOTES
HISTORY OF PRESENT ILLNESS  Joy Closs is a 67 y.o. female. HPI   Last here 2/9/18. Pt is here for routine care. BP today is 106/66  SBPs are 115 at home  Continues on dyazide 37.5-25mg and felodipine 5mg daily      Wt is down 3 lbs since last visit  Her weight is within normal ranges    Reviewed last labs 4/18   Ordered labs to complete prior to next visit     Reviewed US retroperitoneum 11/17: 1. Previous right renal septated cyst is no longer apparent. 2. There are 2 simple cysts in the right kidney and 1 in the left kidney. 3. No hydronephrosis. 4. Normal renal cortical thickness and echogenicity. Continues on lipitor 20mg daily for cholesterol - at goal in 10/17     Continues on protonix 20mg daily for reflux - works well, no breakthrough   Recall pt tried tapering off PPI, but this was unsuccessful       Continues on vit D OTC 1000U daily     Pt states that trazodone did not allow her to sleep well  Pt does not use this med qhs    Pt c/o itching to her forehead  Pt has seen a derm for a darkened forehead in the past  Pt states that the derm did not say that she had eczema  Provided referral for a derm  Discussed the location of her sx being consistent with seborrheic dermatitis  Ordered ketoconazole cream    On exam, pt had a 1\" nodule supraclavicular on her R side  Ordered CT neck    Pt does not drink any alcohol    Pt is independent (pt can drive/feed/bathe etc. herself)    Pt lives and gets along well with her        Pt had a zostavax vaccine in 2010  Discussed shingrix being a dead vaccine   Discussed it being more effective than zostavax (92% effective)  Discussed it being a 2 part series  Ordered 05/01/18        ACP on file. SDM is her  Laura Rosales).       Recall saw cardiologist in past for CP. No blockage was found.  No recurrent CP.      PREVENTIVE:    Colonoscopy: 10/7/14, Dr. David Bonner, repeat 5 years, due 10/19  Pap: Dr. Juliet Becerril, 7/15,  every other year, hysterectomy and BSO in 1994, due  Mammogram: 7/17, negative, declines 3D mammo, due 7/18  DEXA: 3/28/16 normal, due 3/18, ordered  Tdap: 4/08/2015  Pneumovax: 9/02/2014  Ebyuvyy92: 11/03/2016  Zostavax: 2010    Shingrix: ordered 05/01/18   Flu shot: 10/12/17  A1c: 6.0, 9/13 5.8, 12/13 6.1, 8/14 6.1, 9/15 6.3, 12/15 6.2, 5/16 6.1, 10/16 6.2, 4/17 6.2, 10/17 5.7, 4/18 5.7  Eye exam: Dr. Larry Dixon, 3/18, will get notes for review  Lipids: 10/17 LDL 75    Patient Active Problem List    Diagnosis Date Noted    Angioedema 08/25/2016    ACP (advance care planning) 12/08/2015    Prediabetes 04/08/2015    S/P laparoscopic cholecystectomy 05/11/2014    Hyperlipidemia 04/01/2013    CAD (coronary artery disease) 10/01/2012    H/O allergy     Hypertension     Indigestion      Current Outpatient Prescriptions   Medication Sig Dispense Refill    traZODone (DESYREL) 50 mg tablet Take 1 Tab by mouth nightly. 30 Tab 0    ibuprofen (MOTRIN) 800 mg tablet Take 1 Tab by mouth every eight (8) hours as needed for Pain. 20 Tab 0    albuterol sulfate 90 mcg/actuation aepb Take 2 Puffs by inhalation every four (4) hours as needed. Indications: wheeze or cough; please dispense with chamber/spacer 1 Inhaler 0    guaiFENesin-codeine (ROBITUSSIN AC) 100-10 mg/5 mL solution Take 5 mL by mouth three (3) times daily as needed for Cough. Max Daily Amount: 15 mL. Indications: COLD SYMPTOMS, Cough, CAUSES DROWSINESS; DO NOT DRINK,DRIVE, OR USE MACHINERY WHILE TAKING 118 mL 0    felodipine (PLENDIL SR) 5 mg 24 hr tablet Take 1 Tab by mouth daily. 90 Tab 1    triamterene-hydroCHLOROthiazide (DYAZIDE) 37.5-25 mg per capsule Take 1 Cap by mouth daily. 90 Cap 1    atorvastatin (LIPITOR) 20 mg tablet Take 1 Tab by mouth daily. 90 Tab 3    pantoprazole (PROTONIX) 20 mg tablet Take 1 Tab by mouth daily. 90 Tab 1    famotidine (PEPCID) 20 mg tablet Take 1 Tab by mouth two (2) times a day. (Patient taking differently: Take 20 mg by mouth daily. ) 180 Tab 3  dorzolamide-timolol (COSOPT) 22.3-6.8 mg/mL ophthalmic solution       Cholecalciferol, Vitamin D3, (VITAMIN D3) 1,000 unit cap Take  by mouth.  fexofenadine (ALLEGRA) 180 mg tablet Take 180 mg by mouth daily.  aspirin 81 mg tablet Take 81 mg by mouth daily. Past Surgical History:   Procedure Laterality Date    ABDOMEN SURGERY PROC UNLISTED  14    LAPAROSCOPIC CHOLECYSTECTOMY with GRAMS    HX  SECTION      HX CHOLECYSTECTOMY  14    lap mando with cholangiogram    HX HYSTERECTOMY      HX OTHER SURGICAL  14    ERCPwith biliary sphincterotomy CBD balloon sweeps      HX TUBAL LIGATION        Lab Results  Component Value Date/Time   WBC 7.5 2018 08:11 AM   HGB 12.5 2018 08:11 AM   HCT 39.7 2018 08:11 AM   PLATELET 909  08:11 AM   MCV 72.4 (L) 2018 08:11 AM     Lab Results  Component Value Date/Time   Cholesterol, total 142 10/25/2017 09:18 AM   HDL Cholesterol 51 10/25/2017 09:18 AM   LDL, calculated 75 10/25/2017 09:18 AM   Triglyceride 79 10/25/2017 09:18 AM     Lab Results  Component Value Date/Time   GFR est non-AA 51 (L) 2018 09:01 AM   GFR est AA 59 (L) 2018 09:01 AM   Creatinine 1.08 (H) 2018 09:01 AM   BUN 17 2018 09:01 AM   Sodium 143 2018 09:01 AM   Potassium 3.5 2018 09:01 AM   Chloride 101 2018 09:01 AM   CO2 27 2018 09:01 AM        Review of Systems   HENT: Negative for hearing loss. Respiratory: Negative for shortness of breath. Cardiovascular: Negative for chest pain. Musculoskeletal: Negative for falls. Skin: Positive for itching. Psychiatric/Behavioral: Negative for depression and memory loss. Physical Exam   Constitutional: She is oriented to person, place, and time. She appears well-developed and well-nourished. No distress. HENT:   Head: Normocephalic and atraumatic.    Right Ear: External ear normal.   Left Ear: External ear normal.   Mouth/Throat: Oropharynx is clear and moist. No oropharyngeal exudate. Eyes: Conjunctivae and EOM are normal. Right eye exhibits no discharge. Left eye exhibits no discharge. Neck: Normal range of motion. Neck supple. 1\" nodule supraclavicular on R side   Cardiovascular: Normal rate, regular rhythm, normal heart sounds and intact distal pulses. Exam reveals no gallop and no friction rub. No murmur heard. Pulmonary/Chest: Effort normal and breath sounds normal. No respiratory distress. She has no wheezes. She has no rales. She exhibits no tenderness. Abdominal: Soft. She exhibits no distension and no mass. There is no tenderness. There is no rebound and no guarding. Musculoskeletal: Normal range of motion. She exhibits no edema, tenderness or deformity. Lymphadenopathy:     She has no cervical adenopathy. Neurological: She is alert and oriented to person, place, and time. Coordination normal.   Skin: Skin is warm and dry. No rash noted. She is not diaphoretic. No erythema. No pallor. Psychiatric: She has a normal mood and affect. Her behavior is normal.       ASSESSMENT and PLAN    ICD-10-CM ICD-9-CM    1. Essential hypertension    Controled on dyazide and felodipine    I10 401.9 LIPID PANEL      METABOLIC PANEL, COMPREHENSIVE      CBC W/O DIFF      HEMOGLOBIN A1C WITH EAG      TSH 3RD GENERATION   2. Medicare annual wellness visit, subsequent Z00.00 V70.0 LIPID PANEL      METABOLIC PANEL, COMPREHENSIVE      CBC W/O DIFF      HEMOGLOBIN A1C WITH EAG      TSH 3RD GENERATION   3. Bone disease    DEXA ordered   M89.9 733.90 DEXA BONE DENSITY STUDY AXIAL      LIPID PANEL      METABOLIC PANEL, COMPREHENSIVE      CBC W/O DIFF      HEMOGLOBIN A1C WITH EAG      TSH 3RD GENERATION   4. IFG (impaired fasting glucose)    a1c stable, wt nl, continue with diet   R73.01 790.21 LIPID PANEL      METABOLIC PANEL, COMPREHENSIVE      CBC W/O DIFF      HEMOGLOBIN A1C WITH EAG      TSH 3RD GENERATION   5.  Gastroesophageal reflux disease without esophagitis    Well-controled with protonix, tried tapering off PPI, but this was unsuccessful    K21.9 530.81 LIPID PANEL      METABOLIC PANEL, COMPREHENSIVE      CBC W/O DIFF      HEMOGLOBIN A1C WITH EAG      TSH 3RD GENERATION   6. Intrinsic eczema    Suspect really more of a sub derm, will treat with ketoconazole cream, if not improving she will see a derm   L20.84 691.8 LIPID PANEL      METABOLIC PANEL, COMPREHENSIVE      CBC W/O DIFF      HEMOGLOBIN A1C WITH EAG      TSH 3RD GENERATION   7. Lymph node enlargement    New mass supraclavicular on R concerning for enlarged lymph node, non tender, will get CT neck scheduled for this week   R59.9 785.6 CT NECK SOFT TISSUE W CONT   8. Seborrheic dermatitis    See above   L21.9 690.10 REFERRAL TO DERMATOLOGY        Depression screen reviewed and negative. Scribed by Kari Mina of [address] [place] [address] [address], as dictated by Dr. Paulino Ramesh. Current diagnosis and concerns discussed with pt at length. Pt understands risks and benefits or current treatment plan and medications, and accepts the treatment and medication with any possible risks. Pt asks appropriate questions, which were answered. Pt was instructed to call with any concerns or problems. This note will not be viewable in [address]

## 2018-05-01 NOTE — PROGRESS NOTES
This is the Subsequent Medicare Annual Wellness Exam, performed 12 months or more after the Initial AWV or the last Subsequent AWV    I have reviewed the patient's medical history in detail and updated the computerized patient record. History     Past Medical History:   Diagnosis Date    Dyspepsia and other specified disorders of function of stomach     GERD (gastroesophageal reflux disease)     H/O allergy     Hypercholesterolemia     Hypertension     Indigestion     Other ill-defined conditions(799.89)     elevated cholesterol      Past Surgical History:   Procedure Laterality Date    ABDOMEN SURGERY PROC UNLISTED  14    LAPAROSCOPIC CHOLECYSTECTOMY with GRAMS    HX  SECTION      HX CHOLECYSTECTOMY  14    lap mando with cholangiogram    HX HYSTERECTOMY      HX OTHER SURGICAL  14    ERCPwith biliary sphincterotomy CBD balloon sweeps      HX TUBAL LIGATION       Current Outpatient Prescriptions   Medication Sig Dispense Refill    traZODone (DESYREL) 50 mg tablet Take 1 Tab by mouth nightly. 30 Tab 0    ibuprofen (MOTRIN) 800 mg tablet Take 1 Tab by mouth every eight (8) hours as needed for Pain. 20 Tab 0    albuterol sulfate 90 mcg/actuation aepb Take 2 Puffs by inhalation every four (4) hours as needed. Indications: wheeze or cough; please dispense with chamber/spacer 1 Inhaler 0    guaiFENesin-codeine (ROBITUSSIN AC) 100-10 mg/5 mL solution Take 5 mL by mouth three (3) times daily as needed for Cough. Max Daily Amount: 15 mL. Indications: COLD SYMPTOMS, Cough, CAUSES DROWSINESS; DO NOT DRINK,DRIVE, OR USE MACHINERY WHILE TAKING 118 mL 0    felodipine (PLENDIL SR) 5 mg 24 hr tablet Take 1 Tab by mouth daily. 90 Tab 1    triamterene-hydroCHLOROthiazide (DYAZIDE) 37.5-25 mg per capsule Take 1 Cap by mouth daily. 90 Cap 1    atorvastatin (LIPITOR) 20 mg tablet Take 1 Tab by mouth daily. 90 Tab 3    pantoprazole (PROTONIX) 20 mg tablet Take 1 Tab by mouth daily.  90 Tab 1    famotidine (PEPCID) 20 mg tablet Take 1 Tab by mouth two (2) times a day. (Patient taking differently: Take 20 mg by mouth daily. ) 180 Tab 3    dorzolamide-timolol (COSOPT) 22.3-6.8 mg/mL ophthalmic solution       Cholecalciferol, Vitamin D3, (VITAMIN D3) 1,000 unit cap Take  by mouth.  fexofenadine (ALLEGRA) 180 mg tablet Take 180 mg by mouth daily.  aspirin 81 mg tablet Take 81 mg by mouth daily. No Known Allergies  Family History   Problem Relation Age of Onset    Hypertension Mother     Cancer Brother      prostate     Social History   Substance Use Topics    Smoking status: Never Smoker    Smokeless tobacco: Never Used    Alcohol use No     Patient Active Problem List   Diagnosis Code    H/O allergy Z88.9    Hypertension I10    Indigestion K30    CAD (coronary artery disease) I25.10    Hyperlipidemia E78.5    S/P laparoscopic cholecystectomy Z90.49    Prediabetes R73.03    ACP (advance care planning) Z71.89    Angioedema T78. 3XXA       Depression Risk Factor Screening:     PHQ over the last two weeks 5/1/2018   Little interest or pleasure in doing things Not at all   Feeling down, depressed or hopeless Not at all   Total Score PHQ 2 0     Alcohol Risk Factor Screening: You do not drink alcohol or very rarely. Functional Ability and Level of Safety:   Hearing Loss  Hearing is good. Activities of Daily Living  The home contains: no safety equipment. Patient does total self care    Fall Risk  Fall Risk Assessment, last 12 mths 5/1/2018   Able to walk? Yes   Fall in past 12 months?  No       Abuse Screen  Patient is not abused  Lives with , safe happy   Cognitive Screening   Evaluation of Cognitive Function:  Has your family/caregiver stated any concerns about your memory: no  Normal    Patient Care Team   Patient Care Team:  Sri Tovar MD as PCP - General (Internal Medicine)  Wilbur Hurt MD as Consulting Provider (Obstetrics & Gynecology)  Alex Waldrop MD (Gastroenterology)  Renato Bedoya  (Ophthalmology)   updated    Assessment/Plan   Education and counseling provided:  Are appropriate based on today's review and evaluation  End-of-Life planning (with patient's consent)  Screening Pap and pelvic (covered once every 2 years)  Colorectal cancer screening tests  Cardiovascular screening blood test  Bone mass measurement (DEXA)  Screening for glaucoma  Diabetes screening test    Diagnoses and all orders for this visit:    1. Medicare annual wellness visit, subsequent      Health Maintenance Due   Topic Date Due    GLAUCOMA SCREENING Q2Y  03/17/2018     Discussed with patient about advance medical directive. Provided patient blank AMD and Your Right to Decide Booklet. Requested that if completed to provide a copy of AMD to office. ACP on file. SDM is her  Elie Viera).         Colonoscopy: 10/7/14, Dr. Rannie Boeck, repeat 5 years, due 10/19  Pap: Dr. Flex Del Rosario, 7/15,  every other year, hysterectomy and BSO in 1994, due  Mammogram: 7/17, negative, declines 3D mammo, due 7/18  DEXA: 3/28/16 normal, due 3/18, ordered    Tdap: 4/08/2015  Pneumovax: 9/02/2014  Vnuoxxb53: 11/03/2016  Zostavax: 2010    Shingrix: ordered 05/01/18   Flu shot: 10/12/17    Eye exam: Dr. Corine Newman, 3/18, will get notes for review    A1c:  4/18 5.7 q6months  Lipids: 10/17 LDL 75  Annual     Medication reconciliation completed by MA and reviewed by me. Medical/surgical/social/family history reviewed and updated by me. Patient provided AVS and preventative screening table. Patient verbalized understanding of all information discussed.

## 2018-05-01 NOTE — MR AVS SNAPSHOT
[address] 
[phone] Patient: Sancho Bond MRN: [id number] :1945 Visit Information Date & Time Provider Department Dept. Phone Encounter #  
 2018  8:45 AM Marcela Sandhu, [address] [id number] Follow-up Instructions Return in about 6 months (around 2018). Upcoming Health Maintenance Date Due  
 GLAUCOMA SCREENING Q2Y 3/17/2018 MEDICARE YEARLY EXAM 2018 Influenza Age 5 to Adult 2018 BREAST CANCER SCRN MAMMOGRAM 2019 COLONOSCOPY 10/13/2024 DTaP/Tdap/Td series (2 - Td) 2025 Allergies as of 2018  Review Complete On: 2018 By: Marcela Sandhu MD  
 No Known Allergies Current Immunizations  Reviewed on 2012 Name Date Influenza High Dose Vaccine PF 10/12/2017 Influenza Vaccine 2014 Influenza Vaccine (Quad) PF 2016 Influenza Vaccine Split 10/1/2012 Influenza Vaccine Whole 10/1/2011 Pneumococcal Conjugate (PCV-13) 11/3/2016 Pneumococcal Polysaccharide (PPSV-23) 2014 Tdap 2015 Zoster Vaccine, Live 2010 Not reviewed this visit You Were Diagnosed With   
  
 Codes Comments Essential hypertension    -  Primary ICD-10-CM: I10 
ICD-9-CM: 401.9 Medicare annual wellness visit, subsequent     ICD-10-CM: Z00.00 ICD-9-CM: V70.0 Bone disease     ICD-10-CM: M89.9 ICD-9-CM: 733.90 IFG (impaired fasting glucose)     ICD-10-CM: R73.01 
ICD-9-CM: 790.21 Gastroesophageal reflux disease without esophagitis     ICD-10-CM: K21.9 ICD-9-CM: 530.81 Intrinsic eczema     ICD-10-CM: L20.84 ICD-9-CM: 691.8 Lymph node enlargement     ICD-10-CM: R59.9 ICD-9-CM: 785.6 Seborrheic dermatitis     ICD-10-CM: L21.9 ICD-9-CM: 690.10 Vitals BP Pulse Temp Resp Height(growth percentile) Weight(growth percentile) 106/66 (BP 1 Location: Left arm, BP Patient Position: Sitting) 79 97.9 °F (36.6 °C) (Oral) 16 5' 7\" (1.702 m) 143 lb (64.9 kg) SpO2 BMI OB Status Smoking Status 97% 22.4 kg/m2 Hysterectomy Never Smoker BMI and BSA Data Body Mass Index Body Surface Area  
 22.4 kg/m 2 1.75 m 2 Preferred Pharmacy Pharmacy Name Phone [phone], [address] AT [place] [address] [phone] Your Updated Medication List  
  
   
This list is accurate as of 18  8:57 AM.  Always use your most recent med list. ALLEGRA 180 mg tablet Generic drug:  fexofenadine Take 180 mg by mouth daily. aspirin 81 mg tablet Take 81 mg by mouth daily. atorvastatin 20 mg tablet Commonly known as:  LIPITOR Take 1 Tab by mouth daily. dorzolamide-timolol 22.3-6.8 mg/mL ophthalmic solution Commonly known as:  COSOPT  
  
 felodipine 5 mg 24 hr tablet Commonly known as:  PLENDIL SR Take 1 Tab by mouth daily. Ketoconazole 2 % topical foam  
Apply  to affected area two (2) times a day. pantoprazole 20 mg tablet Commonly known as:  PROTONIX Take 1 Tab by mouth daily. triamterene-hydroCHLOROthiazide 37.5-25 mg per capsule Commonly known as:  Filbert Sharma Take 1 Cap by mouth daily. varicella-zoster recombinant (PF) 50 mcg/0.5 mL Susr injection Commonly known as:  SHINGRIX (PF)  
0.5 mL by IntraMUSCular route once for 1 dose. VITAMIN D3 1,000 unit Cap Generic drug:  cholecalciferol Take  by mouth. Prescriptions Printed Refills  
 varicella-zoster recombinant, PF, (SHINGRIX, PF,) 50 mcg/0.5 mL susr injection 1 Si.5 mL by IntraMUSCular route once for 1 dose. Class: Print Route: IntraMUSCular Prescriptions Sent to Pharmacy Refills Ketoconazole 2 % topical foam 0 Sig: Apply  to affected area two (2) times a day. Class: Normal  
 Pharmacy: [facility] [address] [place] [address] [address] [facility] #: [phone] Route: Topical  
  
We Performed the Following REFERRAL TO DERMATOLOGY [REF19 Custom] Follow-up Instructions Return in about 6 months (around 11/1/2018). To-Do List   
 05/01/2018 Imaging:  CT NECK SOFT TISSUE W CONT   
  
 05/01/2018 Imaging:  DEXA BONE DENSITY STUDY AXIAL   
  
 05/08/2018 Lab:  CBC W/O DIFF   
  
 05/08/2018 Lab:  HEMOGLOBIN A1C WITH EAG   
  
 05/08/2018 Lab:  LIPID PANEL   
  
 05/08/2018 Lab:  METABOLIC PANEL, COMPREHENSIVE   
  
 05/08/2018 Lab:  TSH 3RD GENERATION Referral Information Referral ID Referred By Referred To  
  
 [id number] Hortensia Ford Not Available Visits Status Start Date End Date 1 New Request 5/1/18 5/1/19 If your referral has a status of pending review or denied, additional information will be sent to support the outcome of this decision. Referral ID Referred By Referred To  
 [id number] MARILEE, [address] [facility], [address] Phone: [phone] Visits Status Start Date End Date 1 New Request 5/1/18 5/1/19 If your referral has a status of pending review or denied, additional information will be sent to support the outcome of this decision. Patient Instructions Medicare Wellness Visit, Female The best way to live healthy is to have a healthy lifestyle by eating a well-balanced diet, exercising regularly, limiting alcohol and stopping smoking. Regular physical exams and screening tests are another way to keep healthy. Preventive exams provided by your health care provider can find health problems before they become diseases or illnesses.  Preventive services including immunizations, screening tests, monitoring and exams can help you take care of your own health. All people over age 72 should have a pneumovax  and and a prevnar shot to prevent pneumonia. These are once in a lifetime unless you and your provider decide differently. All people over 65 should have a yearly flu shot and a tetanus vaccine every 10 years. A bone mass density to screen for osteoporosis or thinning of the bones should be done every 2 years after 65. Screening for diabetes mellitus with a blood sugar test should be done every year. Glaucoma is a disease of the eye due to increased ocular pressure that can lead to blindness and it should be done every year by an eye professional. 
 
Cardiovascular screening tests that check for elevated lipids (fatty part of blood) which can lead to heart disease and strokes should be done every 5 years. Colorectal screening that evaluates for blood or polyps in your colon should be done yearly as a stool test or every five years as a flexible sigmoidoscope or every 10 years as a colonoscopy up to age 76. Breast cancer screening with a mammogram is recommended biennially  for women age 54-69. Screening for cervical cancer with a pap smear and pelvic exam is recommended for women after age 72 years every 2 years up to age 79 or when the provider and patient decide to stop. If there is a history of cervical abnormalities or other increased risk for cancer then the test is recommended yearly. Hepatitis C screening is also recommended for anyone born between 80 through [address]. A shingles vaccine is also recommended once in a lifetime after age 61. Your Medicare Wellness Exam is recommended annually. Here is a list of your current Health Maintenance items with a due date: 
Health Maintenance Due Topic Date Due  Glaucoma Screening   03/17/2018 Medicare Part B Preventive Services Guidelines/Limitations Date last completed and Frequency Due Date Bone Mass Measurement 
(age 72 & older, biennial) Requires diagnosis related to osteoporosis or estrogen deficiency. Biennial benefit unless patient has history of long-term glucocorticoid tx or baseline is needed because initial test was by other method Completed 3/28/16 
   
Recommended every 2 years Due now Cardiovascular Screening Blood Tests (every 5 years) Total cholesterol, HDL, Triglycerides Order as a panel if possible Completed 10/2017 
   
Recommended annually Due 10/2018 Colorectal Cancer Screening 
-Fecal occult blood test (annual) -Flexible sigmoidoscopy (5y) 
-Screening colonoscopy (10y) -Barium Enema    Completed 10/07/14 with Dr. Riana Mendosa 
   
Recommended every 5 years  Due 10/2019 Counseling to Prevent Tobacco Use (up to 8 sessions per year) - Counseling greater than 3 and up to 10 minutes - Counseling greater than 10 minutes Patients must be asymptomatic of tobacco-related conditions to receive as preventive service N/A N/A Diabetes Screening Tests (at least every 3 years, Medicare covers annually or at 6-month intervals for prediabetic patients) 
   
Fasting blood sugar (FBS) or glucose tolerance test (GTT) Patient must be diagnosed with one of the following: 
-Hypertension, Dyslipidemia, obesity, previous impaired FBS or GTT 
Or any two of the following: overweight, FH of diabetes, age ? 72, history of gestational diabetes, birth of baby weighing more than 9 pounds Completed 4/2018 with A1C 5.7 
   
Recommended every 3-6 months for Pre-Diabetics and Diabetics Due 7/2017-10/2018 Diabetes Self-Management Training (DSMT) (no USPSTF recommendation) Requires referral by treating physician for patient with diabetes or renal disease. 10 hours of initial DSMT session of no less than 30 minutes each in a continuous 12-month period. 2 hours of follow-up DSMT in subsequent years.  N/A N/A  
 Glaucoma Screening (no USPSTF recommendation) Diabetes mellitus, family history, , age 48 or over,  American, age 72 or over Completed 3/2018 with Dr. Gaffney Monday 
Recommended annually Due 3/19 Human Immunodeficiency Virus (HIV) Screening (annually for increased risk patients) HIV-1 and HIV-2 by EIA, CRISTY, rapid antibody test, or oral mucosa transudate Patient must be at increased risk for HIV infection per USPSTF guidelines or pregnant. Tests covered annually for patients at increased risk. Pregnant patients may receive up to 3 test during pregnancy. N/A N/A Medical Nutrition Therapy (MNT) (for diabetes or renal disease not recommended schedule) Requires referral by treating physician for patient with diabetes or renal disease. Can be provided in same year as diabetes self-management training (DSMT), and CMS recommends medical nutrition therapy take place after DSMT. Up to 3 hours for initial year and 2 hours in subsequent years. N/A N/A Prostate Cancer Screening (annually up to age 76) - Digital rectal exam (LAYLA) - Prostate specific antigen (PSA) Annually (age 48 or over), LAYLA not paid separately when covered E/M service is provided on same date N/A N/A Seasonal Influenza Vaccination (annually)    Completed 10/2017 
   
Recommended annually Due Fall 2018 Pneumococcal Vaccination (once after 65)    Pneumococcal 23 - 9/02/2014 
   
Prevnar 13 - 
11/03/2016  
  
Both recommended once over the age of 72 [address] 
Completed Hepatitis B Vaccinations (if medium/high risk) Medium/high risk factors: End-stage renal disease, Hemophiliacs who received Factor VIII or IX concentrates, Clients of institutions for the mentally retarded, Persons who live in the same house as a HepB virus carrier, Homosexual men, Illicit injectable drug abusers. N/A N/A Screening Mammography (biennial age 54-69)? Annually (age 36 or over) Completed 7/2017 
   
Recommended annually Due 7/2018 Screening Pap Tests and Pelvic Examination (up to age 79 and after 79 if unknown history or abnormal study last 10 years) Every 24 months except high risk Completed 7/2015 with Dr. Enzo Luna 
Recommended every other year  Due now Ultrasound Screening for Abdominal Aortic Aneurysm (AAA) (once) Patient must be referred through [facility] and not have had a screening for abdominal aortic aneurysm before under Medicare. Limited to patients who meet one of the following criteria: 
- Men who are 73-68 years old and have smoked more than 100 cigarettes in their lifetime. 
-Anyone with a FH of AAA 
-Anyone recommended for screening by USPSTF Completed US retroperitoneum 11/2017 - negative Completed   
  
  
 
 
 
  
Introducing [facility]! Juan Boyle introduces [employer] patient portal. Now you can access parts of your medical record, email your doctor's office, and request medication refills online. 1. In your internet browser, go to [email or web address] 2. Click on the First Time User? Click Here link in the Sign In box. You will see the New Member Sign Up page. 3. Enter your [employer] Access Code exactly as it appears below. You will not need to use this code after youve completed the sign-up process. If you do not sign up before the expiration date, you must request a new code. · [employer] Access Code: [email or web address] Expires: 5/8/2018 10:21 AM 
 
4. Enter the last four digits of your Social Security Number (xxxx) and Date of Birth (mm/dd/yyyy) as indicated and click Submit. You will be taken to the next sign-up page. 5. Create a [employer] ID. This will be your [employer] login ID and cannot be changed, so think of one that is secure and easy to remember. 6. Create a [employer] password. You can change your password at any time. 7. Enter your Password Reset Question and Answer. This can be used at a later time if you forget your password. 8. Enter your e-mail address. You will receive e-mail notification when new information is available in [address] 9. Click Sign Up. You can now view and download portions of your medical record. 10. Click the Download Summary menu link to download a portable copy of your medical information. If you have questions, please visit the Frequently Asked Questions section of the [employer] website. Remember, [employer] is NOT to be used for urgent needs. For medical emergencies, dial 911. Now available from your iPhone and Android! Please provide this summary of care documentation to your next provider. Your primary care clinician is listed as [place]. If you have any questions after today's visit, please call [phone].

## 2018-05-01 NOTE — PATIENT INSTRUCTIONS
Medicare Wellness Visit, Female    The best way to live healthy is to have a healthy lifestyle by eating a well-balanced diet, exercising regularly, limiting alcohol and stopping smoking. Regular physical exams and screening tests are another way to keep healthy. Preventive exams provided by your health care provider can find health problems before they become diseases or illnesses. Preventive services including immunizations, screening tests, monitoring and exams can help you take care of your own health. All people over age 72 should have a pneumovax  and and a prevnar shot to prevent pneumonia. These are once in a lifetime unless you and your provider decide differently. All people over 65 should have a yearly flu shot and a tetanus vaccine every 10 years. A bone mass density to screen for osteoporosis or thinning of the bones should be done every 2 years after 65. Screening for diabetes mellitus with a blood sugar test should be done every year. Glaucoma is a disease of the eye due to increased ocular pressure that can lead to blindness and it should be done every year by an eye professional.    Cardiovascular screening tests that check for elevated lipids (fatty part of blood) which can lead to heart disease and strokes should be done every 5 years. Colorectal screening that evaluates for blood or polyps in your colon should be done yearly as a stool test or every five years as a flexible sigmoidoscope or every 10 years as a colonoscopy up to age 76. Breast cancer screening with a mammogram is recommended biennially  for women age 54-69. Screening for cervical cancer with a pap smear and pelvic exam is recommended for women after age 72 years every 2 years up to age 79 or when the provider and patient decide to stop. If there is a history of cervical abnormalities or other increased risk for cancer then the test is recommended yearly.     Hepatitis C screening is also recommended for anyone born between 80 through [facility]. A shingles vaccine is also recommended once in a lifetime after age 61. Your Medicare Wellness Exam is recommended annually. Here is a list of your current Health Maintenance items with a due date:  Health Maintenance Due   Topic Date Due    Glaucoma Screening   03/17/2018     Medicare Part B Preventive Services Guidelines/Limitations Date last completed and Frequency Due Date   Bone Mass Measurement  (age 72 & older, biennial) Requires diagnosis related to osteoporosis or estrogen deficiency. Biennial benefit unless patient has history of long-term glucocorticoid tx or baseline is needed because initial test was by other method Completed 3/28/16      Recommended every 2 years Due now   Cardiovascular Screening Blood Tests (every 5 years)  Total cholesterol, HDL, Triglycerides Order as a panel if possible Completed 10/2017      Recommended annually Due 10/2018   Colorectal Cancer Screening  -Fecal occult blood test (annual)  -Flexible sigmoidoscopy (5y)  -Screening colonoscopy (10y)  -Barium Enema    Completed 10/07/14 with Dr. Ary Ludwig      Recommended every 5 years  Due 10/2019   Counseling to Prevent Tobacco Use (up to 8 sessions per year)  - Counseling greater than 3 and up to 10 minutes  - Counseling greater than 10 minutes Patients must be asymptomatic of tobacco-related conditions to receive as preventive service N/A N/A   Diabetes Screening Tests (at least every 3 years, Medicare covers annually or at 6-month intervals for prediabetic patients)      Fasting blood sugar (FBS) or glucose tolerance test (GTT) Patient must be diagnosed with one of the following:  -Hypertension, Dyslipidemia, obesity, previous impaired FBS or GTT  Or any two of the following: overweight, FH of diabetes, age ? 72, history of gestational diabetes, birth of baby weighing more than 9 pounds Completed 4/2018 with A1C 5.7      Recommended every 3-6 months for Pre-Diabetics and Diabetics Due 7/2017-10/2018   Diabetes Self-Management Training (DSMT) (no USPSTF recommendation) Requires referral by treating physician for patient with diabetes or renal disease. 10 hours of initial DSMT session of no less than 30 minutes each in a continuous 12-month period. 2 hours of follow-up DSMT in subsequent years. N/A N/A   Glaucoma Screening (no USPSTF recommendation) Diabetes mellitus, family history, , age 48 or over,  American, age 72 or over Completed 3/2018 with Dr. Shayla Boogie  Recommended annually Due 3/19   Human Immunodeficiency Virus (HIV) Screening (annually for increased risk patients)  HIV-1 and HIV-2 by EIA, CRISTY, rapid antibody test, or oral mucosa transudate Patient must be at increased risk for HIV infection per USPSTF guidelines or pregnant. Tests covered annually for patients at increased risk. Pregnant patients may receive up to 3 test during pregnancy. N/A N/A   Medical Nutrition Therapy (MNT) (for diabetes or renal disease not recommended schedule) Requires referral by treating physician for patient with diabetes or renal disease. Can be provided in same year as diabetes self-management training (DSMT), and CMS recommends medical nutrition therapy take place after DSMT. Up to 3 hours for initial year and 2 hours in subsequent years.  N/A N/A   Prostate Cancer Screening (annually up to age 76)  - Digital rectal exam (LAYLA)  - Prostate specific antigen (PSA) Annually (age 48 or over), LAYLA not paid separately when covered E/M service is provided on same date N/A N/A   Seasonal Influenza Vaccination (annually)    Completed 10/2017      Recommended annually Due Fall 2018   Pneumococcal Vaccination (once after 72)    Pneumococcal 23 - 9/02/2014      Prevnar 13 -  11/03/2016      Both recommended once over the age of 72 Completed            Completed   Hepatitis B Vaccinations (if medium/high risk) Medium/high risk factors: End-stage renal disease,  Hemophiliacs who received Factor VIII or IX concentrates, Clients of institutions for the mentally retarded, Persons who live in the same house as a HepB virus carrier, Homosexual men, Illicit injectable drug abusers. N/A N/A   Screening Mammography (biennial age 54-69)? Annually (age 36 or over) Completed 7/2017      Recommended annually Due 7/2018   Screening Pap Tests and Pelvic Examination (up to age 79 and after 79 if unknown history or abnormal study last 10 years) Every 25 months except high risk Completed 7/2015 with Dr. Dexter Sanford  Recommended every other year  Due now   Ultrasound Screening for Abdominal Aortic Aneurysm (AAA) (once) Patient must be referred through [facility] and not have had a screening for abdominal aortic aneurysm before under Medicare.  Limited to patients who meet one of the following criteria:  - Men who are 73-68 years old and have smoked more than 100 cigarettes in their lifetime.  -Anyone with a FH of AAA  -Anyone recommended for screening by USPSTF Completed US retroperitoneum 11/2017 - negative Completed

## 2018-05-02 RX ORDER — PANTOPRAZOLE SODIUM 20 MG/1
TABLET, DELAYED RELEASE ORAL
Qty: 90 TAB | Refills: 1 | Status: SHIPPED | OUTPATIENT
Start: 2018-05-02 | End: 2018-11-13 | Stop reason: SDUPTHER

## 2018-05-02 RX ORDER — TRIAMTERENE AND HYDROCHLOROTHIAZIDE 37.5; 25 MG/1; MG/1
CAPSULE ORAL
Qty: 90 CAP | Refills: 1 | Status: SHIPPED | OUTPATIENT
Start: 2018-05-02 | End: 2018-11-06 | Stop reason: SDUPTHER

## 2018-05-04 ENCOUNTER — HOSPITAL ENCOUNTER (OUTPATIENT)
Dept: CT IMAGING | Age: 73
Discharge: HOME | End: 2018-05-04
Attending: INTERNAL MEDICINE
Payer: MEDICARE

## 2018-05-04 DIAGNOSIS — R59.9 LYMPH NODE ENLARGEMENT: ICD-10-CM

## 2018-05-04 PROCEDURE — 74011250636 HC RX REV CODE- 250/636: Performed by: INTERNAL MEDICINE

## 2018-05-04 PROCEDURE — 70491 CT SOFT TISSUE NECK W/DYE: CPT

## 2018-05-04 PROCEDURE — 74011636320 HC RX REV CODE- 636/320: Performed by: INTERNAL MEDICINE

## 2018-05-04 RX ORDER — SODIUM CHLORIDE 9 MG/ML
50 INJECTION, SOLUTION INTRAVENOUS
Status: COMPLETED | OUTPATIENT
Start: 2018-05-04 | End: 2018-05-04

## 2018-05-04 RX ORDER — SODIUM CHLORIDE 0.9 % (FLUSH) 0.9 %
10 SYRINGE (ML) INJECTION
Status: COMPLETED | OUTPATIENT
Start: 2018-05-04 | End: 2018-05-04

## 2018-05-04 RX ADMIN — Medication 10 ML: at 06:53

## 2018-05-04 RX ADMIN — IOPAMIDOL 100 ML: 755 INJECTION, SOLUTION INTRAVENOUS at 06:53

## 2018-05-04 RX ADMIN — SODIUM CHLORIDE 50 ML/HR: 900 INJECTION, SOLUTION INTRAVENOUS at 06:53

## 2018-05-04 NOTE — PROGRESS NOTES
Called, spoke to pt. Two pt identifiers confirmed. Pt informed per Dr. Fatmata Awan ct showed benign lipoma. Pt verbalized understanding of information discussed w/ no further questions at this time.

## 2018-05-08 ENCOUNTER — HOSPITAL ENCOUNTER (OUTPATIENT)
Dept: MAMMOGRAPHY | Age: 73
Discharge: HOME | End: 2018-05-08
Attending: INTERNAL MEDICINE
Payer: MEDICARE

## 2018-05-08 DIAGNOSIS — M89.9 BONE DISEASE: ICD-10-CM

## 2018-05-08 PROCEDURE — 77080 DXA BONE DENSITY AXIAL: CPT

## 2018-06-18 RX ORDER — FELODIPINE 5 MG/1
TABLET, EXTENDED RELEASE ORAL
Qty: 90 TAB | Refills: 1 | Status: SHIPPED | OUTPATIENT
Start: 2018-06-18 | End: 2018-11-06 | Stop reason: SDUPTHER

## 2018-11-06 ENCOUNTER — OFFICE VISIT (OUTPATIENT)
Dept: INTERNAL MEDICINE CLINIC | Age: 73
End: 2018-11-06

## 2018-11-06 VITALS
SYSTOLIC BLOOD PRESSURE: 109 MMHG | DIASTOLIC BLOOD PRESSURE: 71 MMHG | HEIGHT: 67 IN | WEIGHT: 147 LBS | RESPIRATION RATE: 16 BRPM | OXYGEN SATURATION: 99 % | HEART RATE: 90 BPM | TEMPERATURE: 98 F | BODY MASS INDEX: 23.07 KG/M2

## 2018-11-06 DIAGNOSIS — I10 ESSENTIAL HYPERTENSION: Primary | ICD-10-CM

## 2018-11-06 DIAGNOSIS — Z00.00 MEDICARE ANNUAL WELLNESS VISIT, SUBSEQUENT: ICD-10-CM

## 2018-11-06 DIAGNOSIS — M25.561 ACUTE PAIN OF BOTH KNEES: ICD-10-CM

## 2018-11-06 DIAGNOSIS — R26.89 POOR BALANCE: ICD-10-CM

## 2018-11-06 DIAGNOSIS — M25.562 ACUTE PAIN OF BOTH KNEES: ICD-10-CM

## 2018-11-06 DIAGNOSIS — K21.9 GASTROESOPHAGEAL REFLUX DISEASE WITHOUT ESOPHAGITIS: ICD-10-CM

## 2018-11-06 DIAGNOSIS — Z12.39 BREAST SCREENING: ICD-10-CM

## 2018-11-06 DIAGNOSIS — E78.00 PURE HYPERCHOLESTEROLEMIA: ICD-10-CM

## 2018-11-06 DIAGNOSIS — M89.9 BONE DISEASE: ICD-10-CM

## 2018-11-06 DIAGNOSIS — N28.89 KIDNEY MASS: ICD-10-CM

## 2018-11-06 DIAGNOSIS — R73.01 IFG (IMPAIRED FASTING GLUCOSE): ICD-10-CM
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
## 2018-11-06 DIAGNOSIS — L20.84 INTRINSIC ECZEMA: ICD-10-CM

## 2018-11-06 DIAGNOSIS — I25.10 CORONARY ARTERY DISEASE INVOLVING NATIVE CORONARY ARTERY OF NATIVE HEART WITHOUT ANGINA PECTORIS: ICD-10-CM

## 2018-11-06 DIAGNOSIS — Z23 ENCOUNTER FOR IMMUNIZATION: ICD-10-CM

## 2018-11-06 RX ORDER — FELODIPINE 5 MG/1
5 TABLET, EXTENDED RELEASE ORAL DAILY
Qty: 90 TAB | Refills: 1 | Status: SHIPPED | OUTPATIENT
Start: 2018-11-06 | End: 2019-05-07 | Stop reason: SDUPTHER

## 2018-11-06 RX ORDER — ATORVASTATIN CALCIUM 20 MG/1
20 TABLET, FILM COATED ORAL DAILY
Qty: 90 TAB | Refills: 1 | Status: SHIPPED | OUTPATIENT
Start: 2018-11-06 | End: 2019-05-07 | Stop reason: SDUPTHER

## 2018-11-06 RX ORDER — METHYLPREDNISOLONE 4 MG/1
TABLET ORAL
Qty: 1 DOSE PACK | Refills: 0 | Status: SHIPPED | OUTPATIENT
Start: 2018-11-06 | End: 2019-05-07

## 2018-11-06 RX ORDER — TRIAMTERENE AND HYDROCHLOROTHIAZIDE 37.5; 25 MG/1; MG/1
1 CAPSULE ORAL DAILY
Qty: 90 CAP | Refills: 1 | Status: SHIPPED | OUTPATIENT
Start: 2018-11-06 | End: 2019-05-07 | Stop reason: SDUPTHER
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
## 2018-11-07 LAB
ALBUMIN SERPL-MCNC: 4.4 G/DL (ref 3.5–4.8)
ALBUMIN/GLOB SERPL: 1.4 {RATIO} (ref 1.2–2.2)
ALP SERPL-CCNC: 78 IU/L (ref 39–117)
ALT SERPL-CCNC: 25 IU/L (ref 0–32)
AST SERPL-CCNC: 22 IU/L (ref 0–40)
BILIRUB SERPL-MCNC: 0.4 MG/DL (ref 0–1.2)
BUN SERPL-MCNC: 15 MG/DL (ref 8–27)
BUN/CREAT SERPL: 14 (ref 12–28)
CALCIUM SERPL-MCNC: 9.3 MG/DL (ref 8.7–10.3)
CHLORIDE SERPL-SCNC: 99 MMOL/L (ref 96–106)
CHOLEST SERPL-MCNC: 133 MG/DL (ref 100–199)
CO2 SERPL-SCNC: 25 MMOL/L (ref 20–29)
CREAT SERPL-MCNC: 1.06 MG/DL (ref 0.57–1)
ERYTHROCYTE [DISTWIDTH] IN BLOOD BY AUTOMATED COUNT: 17 % (ref 12.3–15.4)
EST. AVERAGE GLUCOSE BLD GHB EST-MCNC: 123 MG/DL
GLOBULIN SER CALC-MCNC: 3.1 G/DL (ref 1.5–4.5)
GLUCOSE SERPL-MCNC: 88 MG/DL (ref 65–99)
HBA1C MFR BLD: 5.9 % (ref 4.8–5.6)
HCT VFR BLD AUTO: 37.3 % (ref 34–46.6)
HDLC SERPL-MCNC: 42 MG/DL
HGB BLD-MCNC: 11.6 G/DL (ref 11.1–15.9)
LDLC SERPL CALC-MCNC: 76 MG/DL (ref 0–99)
MCH RBC QN AUTO: 22.7 PG (ref 26.6–33)
MCHC RBC AUTO-ENTMCNC: 31.1 G/DL (ref 31.5–35.7)
MCV RBC AUTO: 73 FL (ref 79–97)
PLATELET # BLD AUTO: 259 X10E3/UL (ref 150–379)
POTASSIUM SERPL-SCNC: 3.8 MMOL/L (ref 3.5–5.2)
PROT SERPL-MCNC: 7.5 G/DL (ref 6–8.5)
RBC # BLD AUTO: 5.11 X10E6/UL (ref 3.77–5.28)
SODIUM SERPL-SCNC: 138 MMOL/L (ref 134–144)
TRIGL SERPL-MCNC: 77 MG/DL (ref 0–149)
TSH SERPL DL<=0.005 MIU/L-ACNC: 2.4 UIU/ML (ref 0.45–4.5)
VLDLC SERPL CALC-MCNC: 15 MG/DL (ref 5–40)
WBC # BLD AUTO: 6.6 X10E3/UL (ref 3.4–10.8)

## 2018-11-08 ENCOUNTER — HOSPITAL ENCOUNTER (OUTPATIENT)
Dept: PHYSICAL THERAPY | Age: 73
Discharge: HOME | End: 2018-11-08
Payer: MEDICARE

## 2018-11-08 PROCEDURE — 97162 PT EVAL MOD COMPLEX 30 MIN: CPT

## 2018-11-08 PROCEDURE — 97110 THERAPEUTIC EXERCISES: CPT

## 2018-11-08 PROCEDURE — G8978 MOBILITY CURRENT STATUS: HCPCS

## 2018-11-08 PROCEDURE — G8979 MOBILITY GOAL STATUS: HCPCS
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
## 2018-11-09 ENCOUNTER — TELEPHONE (OUTPATIENT)
Dept: INTERNAL MEDICINE CLINIC | Age: 73
End: 2018-11-09

## 2018-11-09 NOTE — TELEPHONE ENCOUNTER
Received call from [facility] that Dr. Vila Curling office. [facility] states that Dr. Klarissa Gallo is ordering a CT for the pt and that the US would not be needed. [facility] informed that [address] will be canceled.

## 2018-11-09 NOTE — TELEPHONE ENCOUNTER
#[phone] [facility] with [facility] needs to speak with the nurse that scheduled the US for pt as that will need to be canceled as pt is having a ct done. Hannah Rodriguez will be there until noon only today. Please call prior to that. Thanks.

## 2018-11-09 NOTE — TELEPHONE ENCOUNTER
Spoke to YANETH WHITE at [employer]. PSR states Jacques López is not at her phone at the moment. Message left for Jacques López to clarify if the US ordered by Dr. Jennifer Lockett needs to be canceled d/t Dr. Willi Sampson ordering a CT scan the same day.

## 2018-11-13 ENCOUNTER — OFFICE VISIT (OUTPATIENT)
Dept: INTERNAL MEDICINE CLINIC | Age: 73
End: 2018-11-13

## 2018-11-13 VITALS
BODY MASS INDEX: 22.13 KG/M2 | TEMPERATURE: 97.9 F | OXYGEN SATURATION: 97 % | RESPIRATION RATE: 18 BRPM | HEIGHT: 67 IN | WEIGHT: 141 LBS | SYSTOLIC BLOOD PRESSURE: 104 MMHG | DIASTOLIC BLOOD PRESSURE: 66 MMHG | HEART RATE: 91 BPM

## 2018-11-13 DIAGNOSIS — K30 INDIGESTION: ICD-10-CM

## 2018-11-13 DIAGNOSIS — M25.511 ACUTE PAIN OF BOTH SHOULDERS: Primary | ICD-10-CM

## 2018-11-13 DIAGNOSIS — M25.562 ACUTE PAIN OF LEFT KNEE: ICD-10-CM

## 2018-11-13 DIAGNOSIS — M25.512 ACUTE PAIN OF BOTH SHOULDERS: Primary | ICD-10-CM

## 2018-11-13 DIAGNOSIS — I10 ESSENTIAL HYPERTENSION: ICD-10-CM

## 2018-11-13 RX ORDER — CYCLOBENZAPRINE HCL 5 MG
5 TABLET ORAL
Qty: 20 TAB | Refills: 0 | Status: SHIPPED | OUTPATIENT
Start: 2018-11-13 | End: 2019-05-07

## 2018-11-13 RX ORDER — PANTOPRAZOLE SODIUM 20 MG/1
TABLET, DELAYED RELEASE ORAL
Qty: 30 TAB | Refills: 0 | Status: SHIPPED | OUTPATIENT
Start: 2018-11-13 | End: 2019-05-07

## 2018-11-13 RX ORDER — IBUPROFEN 800 MG/1
800 TABLET ORAL
Qty: 30 TAB | Refills: 0 | Status: SHIPPED | OUTPATIENT
Start: 2018-11-13 | End: 2019-05-07
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
## 2018-11-14 ENCOUNTER — TELEPHONE (OUTPATIENT)
Dept: INTERNAL MEDICINE CLINIC | Age: 73
End: 2018-11-14

## 2018-11-14 ENCOUNTER — HOSPITAL ENCOUNTER (OUTPATIENT)
Dept: PHYSICAL THERAPY | Age: 73
Discharge: HOME | End: 2018-11-14
Payer: MEDICARE

## 2018-11-14 DIAGNOSIS — M25.562 ACUTE PAIN OF BOTH KNEES: Primary | ICD-10-CM
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
## 2018-11-14 DIAGNOSIS — M54.2 NECK PAIN: ICD-10-CM

## 2018-11-14 DIAGNOSIS — M25.561 ACUTE PAIN OF BOTH KNEES: Primary | ICD-10-CM

## 2018-11-14 DIAGNOSIS — R26.89 POOR BALANCE: ICD-10-CM

## 2018-11-14 PROCEDURE — 97110 THERAPEUTIC EXERCISES: CPT

## 2018-11-14 NOTE — TELEPHONE ENCOUNTER
Pt requesting for an order sent to physical therapy for her neck. Pt says she has been doing therapy for her knee but Dr. Melvin Query was supposed to also send orders for her neck. Pt did not have phone number or fax number for office, but said the office is located at [address] [facility]. Pt best contact number is [phone].      Copy/paste [place]

## 2018-11-14 NOTE — TELEPHONE ENCOUNTER
Called, spoke to pt. Two pt identifiers confirmed. Pt informed that new order for PT will be placed. Katiuska Andersen, new referral placed and faxed to [facility] PT at [facility]. Pt verbalized understanding of information discussed w/ no further questions at this time.

## 2018-11-14 NOTE — PROGRESS NOTES
PT DAILY TREATMENT NOTE - [facility] 2-15 Patient Name: Beba Garcia Date:2018 : 1945 [x]  Patient  Verified Payor: Lien Parents / Plan: Kojo Helms / Product Type: Managed Care Medicare / In [address] Total Treatment Time (min): 49 Total Timed Codes (min): 49 
1:1 Treatment Time ( [address] only): 49 Visit #: 2 Treatment Area: Pain in right knee [M25.561] Pain in left knee [M25.562] Other abnormalities of gait and mobility [R26.89] SUBJECTIVE Pain Level (0-10 scale): 5/10 Any medication changes, allergies to medications, adverse drug reactions, diagnosis change, or new procedure performed?: [x] No    [] Yes (see summary sheet for update) Subjective functional status/changes:   [] No changes reported Patient reports she wants to get back to walking. The swelling in the L knee has begun to subside. She has been sore from the exercises. OBJECTIVE 49 min Therapeutic Exercise:  [x] See flow sheet :  
Rationale: increase ROM and increase strength to improve the patients ability to perform ADLs and reduce pain levels With 
 [] TE 
 [] TA 
 [] neuro 
 [] other: Patient Education: [x] Review HEP [] Progressed/Changed HEP based on:  
[] positioning   [] body mechanics   [] transfers   [] heat/ice application   
[] other:   
 
Other Objective/Functional Measures: none noted Pain Level (0-10 scale) post treatment: 4/10 ASSESSMENT/Changes in Function:  
Slight pain in the lateral portion of the LLE while performing bridges. Patient will circumduct the LLE when she first begins to ambulate, but will correct herself shortly. Slight medial knee pain while performing forward step ups. Will continue to progress therex as tolerated.  
Patient will continue to benefit from skilled PT services to modify and progress therapeutic interventions, address functional mobility deficits, address ROM deficits, address strength deficits, analyze and address soft tissue restrictions, analyze and cue movement patterns and analyze and modify body mechanics/ergonomics to attain remaining goals. [x]  See Plan of Care 
[]  See progress note/recertification 
[]  See Discharge Summary Progress towards goals / Updated goals: 
Patient is progressing towards goals, will continue to strengthen the hip stabilizers in order to reduce pain levels. PLAN [x]  Upgrade activities as tolerated     [x]  Continue plan of care [x]  Update interventions per flow sheet      
[]  Discharge due to:_ 
[]  Other:_ Tyler Strange 11/14/2018  8:46 AM

## 2018-11-26 ENCOUNTER — HOSPITAL ENCOUNTER (OUTPATIENT)
Dept: MAMMOGRAPHY | Age: 73
Discharge: HOME | End: 2018-11-26
Attending: INTERNAL MEDICINE
Payer: MEDICARE

## 2018-11-26 ENCOUNTER — APPOINTMENT (OUTPATIENT)
Dept: ULTRASOUND IMAGING | Age: 73
End: 2018-11-26
Attending: INTERNAL MEDICINE
Payer: MEDICARE

## 2018-11-26 ENCOUNTER — HOSPITAL ENCOUNTER (OUTPATIENT)
Dept: CT IMAGING | Age: 73
Discharge: HOME | End: 2018-11-26
Attending: UROLOGY
Payer: MEDICARE

## 2018-11-26 DIAGNOSIS — Z12.39 BREAST SCREENING: ICD-10-CM

## 2018-11-26 DIAGNOSIS — N28.89 RENAL MASS: ICD-10-CM

## 2018-11-26 PROCEDURE — 74170 CT ABD WO CNTRST FLWD CNTRST: CPT

## 2018-11-26 PROCEDURE — 74011636320 HC RX REV CODE- 636/320: Performed by: UROLOGY

## 2018-11-26 PROCEDURE — 77067 SCR MAMMO BI INCL CAD: CPT

## 2018-11-26 PROCEDURE — 74011250636 HC RX REV CODE- 250/636: Performed by: UROLOGY

## 2018-11-26 RX ORDER — SODIUM CHLORIDE 0.9 % (FLUSH) 0.9 %
10 SYRINGE (ML) INJECTION
Status: COMPLETED | OUTPATIENT
Start: 2018-11-26 | End: 2018-11-26

## 2018-11-26 RX ORDER — SODIUM CHLORIDE 9 MG/ML
50 INJECTION, SOLUTION INTRAVENOUS
Status: COMPLETED | OUTPATIENT
Start: 2018-11-26 | End: 2018-11-26

## 2018-11-26 RX ADMIN — SODIUM CHLORIDE 50 ML/HR: 900 INJECTION, SOLUTION INTRAVENOUS at 08:45

## 2018-11-26 RX ADMIN — IOPAMIDOL 100 ML: 755 INJECTION, SOLUTION INTRAVENOUS at 08:45

## 2018-11-26 RX ADMIN — Medication 10 ML: at 08:45

## 2018-11-28 ENCOUNTER — HOSPITAL ENCOUNTER (OUTPATIENT)
Dept: PHYSICAL THERAPY | Age: 73
Discharge: HOME | End: 2018-11-28
Payer: MEDICARE

## 2018-11-28 PROCEDURE — 97110 THERAPEUTIC EXERCISES: CPT

## 2018-11-28 NOTE — PROGRESS NOTES
PT DAILY TREATMENT NOTE - [facility] 2-15 Patient Name: Deuce Vasquez Date:2018 : 1945 [x]  Patient  Verified Payor: Sundeep Burns / Plan: Estle Fillers / Product Type: Managed Care Medicare / In time:859  Out time:955 Total Treatment Time (min): 56 Total Timed Codes (min): 56 
1:1 Treatment Time ( only): 45 Visit #:41 Treatment Area: Pain in right knee [M25.561] Pain in left knee [M25.562] Other abnormalities of gait and mobility [R26.89] SUBJECTIVE Pain Level (0-10 scale): 10 Any medication changes, allergies to medications, adverse drug reactions, diagnosis change, or new procedure performed?: [x] No    [] Yes (see summary sheet for update) Subjective functional status/changes:   [] No changes reported Patient reports she was walking around shopping for over 2 hours yesterday and had no issues until her knee gave out, she was able to catch herself. OBJECTIVE 56 min Therapeutic Exercise:  [x] See flow sheet :  
Rationale: increase ROM and increase strength to improve the patients ability to perform ADLs and reduce pain levels With 
 [] TE 
 [] TA 
 [] neuro 
 [] other: Patient Education: [x] Review HEP [] Progressed/Changed HEP based on:  
[] positioning   [] body mechanics   [] transfers   [] heat/ice application   
[] other:   
 
Other Objective/Functional Measures: none noted Pain Level (0-10 scale) post treatment: 1/10 ASSESSMENT/Changes in Function:   
Patient requires a pillow under the hips while performing prone hip extension to prevent hyper extension and pain in the lower back. Experiences medial knee pain with full knee extension. Will continue to progress therex as tolerated.  
Patient will continue to benefit from skilled PT services to modify and progress therapeutic interventions, address functional mobility deficits, address ROM deficits, address strength deficits, analyze and address soft tissue restrictions, analyze and cue movement patterns and analyze and modify body mechanics/ergonomics to attain remaining goals. [x]  See Plan of Care 
[]  See progress note/recertification 
[]  See Discharge Summary Progress towards goals / Updated goals: 
Patient is progressing towards goals, will continue to strengthen the hip stabilizers in order to reduce pain levels. PLAN [x]  Upgrade activities as tolerated     [x]  Continue plan of care [x]  Update interventions per flow sheet      
[]  Discharge due to:_ 
[]  Other:_ Jordyn MARIE Alexandr 11/28/2018  9:11 AM

## 2018-11-30 ENCOUNTER — OFFICE VISIT (OUTPATIENT)
Dept: INTERNAL MEDICINE CLINIC | Age: 73
End: 2018-11-30

## 2018-11-30 ENCOUNTER — TELEPHONE (OUTPATIENT)
Dept: INTERNAL MEDICINE CLINIC | Age: 73
End: 2018-11-30

## 2018-11-30 VITALS
WEIGHT: 140 LBS | SYSTOLIC BLOOD PRESSURE: 114 MMHG | DIASTOLIC BLOOD PRESSURE: 73 MMHG | HEIGHT: 67 IN | BODY MASS INDEX: 21.97 KG/M2 | HEART RATE: 79 BPM | RESPIRATION RATE: 16 BRPM | OXYGEN SATURATION: 97 % | TEMPERATURE: 97.9 F

## 2018-11-30 DIAGNOSIS — M25.562 LEFT KNEE PAIN, UNSPECIFIED CHRONICITY: Primary | ICD-10-CM

## 2018-11-30 DIAGNOSIS — M25.511 ACUTE PAIN OF BOTH SHOULDERS: ICD-10-CM

## 2018-11-30 DIAGNOSIS — M25.562 ACUTE PAIN OF LEFT KNEE: Primary | ICD-10-CM

## 2018-11-30 DIAGNOSIS — M25.512 ACUTE PAIN OF BOTH SHOULDERS: ICD-10-CM
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
## 2018-11-30 NOTE — TELEPHONE ENCOUNTER
Called, spoke to pt. Two pt identifiers confirmed. Pt offered and accepted appt for 11/30/18 1030. Pt verbalized understanding of information discussed w/ no further questions at this time.
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
## 2018-12-05 ENCOUNTER — HOSPITAL ENCOUNTER (OUTPATIENT)
Dept: PHYSICAL THERAPY | Age: 73
Discharge: HOME | End: 2018-12-05
Payer: MEDICARE

## 2018-12-05 PROCEDURE — G8978 MOBILITY CURRENT STATUS: HCPCS

## 2018-12-05 PROCEDURE — 97110 THERAPEUTIC EXERCISES: CPT

## 2018-12-05 PROCEDURE — G8979 MOBILITY GOAL STATUS: HCPCS
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
## 2018-12-05 NOTE — PROGRESS NOTES
PT DAILY TREATMENT NOTE - [facility] 2-15 Patient Name: Martin Ryan Date:2018 : 1945 [x]  Patient  Verified Payor: Bryan Ramos / Plan: Preethi [place] / Product Type: Managed Care Medicare / In time: 1028  Out time:1135 Total Treatment Time (min): 67 Total Timed Codes (min): 67 
1:1 Treatment Time ( [address] only): 67 Visit #: 4 Treatment Area: Pain in right knee [M25.561] Pain in left knee [M25.562] Other abnormalities of gait and mobility [R26.89] The Student Physical Therapist participated in the delivery of services under the direction of a licensed PT directing the service, making the skilled judgment, and who is responsible for the assessment and treatment. SUBJECTIVE Pain Level (0-10 scale): 3/10 Any medication changes, allergies to medications, adverse drug reactions, diagnosis change, or new procedure performed?: [x] No    [] Yes (see summary sheet for update) Subjective functional status/changes:   [] No changes reported Patient reports that she has good days and bad days. Today is an in-between day. She reports that she may have some clicking when she is doing too much. She has not had her knee balloon up recently. There is still some swelling on the inside of her knee OBJECTIVE 67 min Therapeutic Exercise:  [x] See flow sheet :  
Rationale: increase ROM, increase strength, improve coordination and improve balance to improve the patients ability to perform all activities at home and in the community. With 
 [x] TE 
 [] TA 
 [] neuro 
 [] other: Patient Education: [x] Review HEP [] Progressed/Changed HEP based on:  
[] positioning   [] body mechanics   [] transfers   [] heat/ice application   
[] other:   
 
Other Objective/Functional Measures: Foto 73/100  
 30 second sit to stand: 9 (no hands) Pain Level (0-10 scale) post treatment: 0/10 ASSESSMENT/Changes in Function: Patient is progressing well with all exercises, particularly with her balance activities, with minimal increase in her knee symptoms. She is reporting and demonstrating improvements in function and strength but continues to demonstrate deficits with strength and balance. Patient will continue to benefit from skilled PT services to modify and progress therapeutic interventions, address functional mobility deficits, address ROM deficits, address strength deficits, analyze and cue movement patterns and assess and modify postural abnormalities to attain remaining goals. []  See Plan of Care [x]  See progress note/recertification 
[]  See Discharge Summary Progress towards goals / Updated goals: 
Short Term Goals: To be accomplished in 5 treatments: 
1. The Pt will be independent and compliant with their HEP. Met 
2. The Pt will report a 50% reduction in their pain with ADLs. Met Long Term Goals: To be accomplished in 10 treatments: 
1. The Pt will score the MCII on her FOTO survey demonstrating improved overall function (64 to 68 points). Met 
2. The Pt will be able to perform >/= 10 sit to stands in 30s without use of her hands demonstrating improved LE strength and stability. Progressing towards 3. The Pt will be able to return to walking 1hr 3x a week with 0-2/10 pain to allow the Pt to be able to return to her prior level of recreation. Progressing towards PLAN [x]  Upgrade activities as tolerated     [x]  Continue plan of care [x]  Update interventions per flow sheet      
[]  Discharge due to:_ 
[]  Other:_   
 
Man Shin 12/5/2018  10:42 AM

## 2018-12-12 ENCOUNTER — HOSPITAL ENCOUNTER (OUTPATIENT)
Dept: PHYSICAL THERAPY | Age: 73
Discharge: HOME | End: 2018-12-12
Payer: MEDICARE

## 2018-12-12 PROCEDURE — 97110 THERAPEUTIC EXERCISES: CPT

## 2018-12-12 NOTE — PROGRESS NOTES
PT DAILY TREATMENT NOTE - [facility] 2-15    Patient Name: Abdi Dsouza  Date:2018  : 1945  [x]  Patient  Verified  Payor: Valerie Hi / Plan: Evangelista Gonzales / Product Type: Managed Care Medicare /    In time:905  Out time:1006  Total Treatment Time (min): 61  Total Timed Codes (min): 61  1:1 Treatment Time ([address] only): 61   Visit #: 5     Treatment Area: Pain in right knee [M25.561]  Pain in left knee [M25.562]  Other abnormalities of gait and mobility [R26.89]    SUBJECTIVE  Pain Level (0-10 scale): 3/10  Any medication changes, allergies to medications, adverse drug reactions, diagnosis change, or new procedure performed?: [x] No    [] Yes (see summary sheet for update)  Subjective functional status/changes:   [] No changes reported  Patient reports no changes since last visit. OBJECTIVE    61 min Therapeutic Exercise:  [x] See flow sheet :   Rationale: increase ROM and increase strength to improve the patients ability to perform ADLs and reduce pain levels. Witht   [] TE   [] TA   [] neuro   [] other: Patient Education: [x] Review HEP    [] Progressed/Changed HEP based on:   [] positioning   [] body mechanics   [] transfers   [] heat/ice application    [] other:      Other Objective/Functional Measures: none noted     Pain Level (0-10 scale) post treatment: 0/10    ASSESSMENT/Changes in Function:   Slight discomfort while performing total gym squats and step ups. Will continue to progress therex as tolerated. Patient will continue to benefit from skilled PT services to modify and progress therapeutic interventions, address functional mobility deficits, address ROM deficits, address strength deficits, analyze and address soft tissue restrictions and analyze and cue movement patterns to attain remaining goals.      [x]  See Plan of Care  []  See progress note/recertification  []  See Discharge Summary         Progress towards goals / Updated goals:  Patient is progressing towards goals, will continue to strengthen hip stabilizers in order to reduce pain levels.     PLAN  [x]  Upgrade activities as tolerated     [x]  Continue plan of care  [x]  Update interventions per flow sheet       []  Discharge due to:_  []  Other:_      Tarry Morning 12/12/2018  9:58 AM

## 2018-12-14 ENCOUNTER — HOSPITAL ENCOUNTER (OUTPATIENT)
Dept: MRI IMAGING | Age: 73
Discharge: HOME | End: 2018-12-14
Attending: ORTHOPAEDIC SURGERY
Payer: MEDICARE

## 2018-12-14 DIAGNOSIS — S83.207A ACUTE MENISCAL TEAR OF LEFT KNEE: ICD-10-CM

## 2018-12-14 PROCEDURE — 73721 MRI JNT OF LWR EXTRE W/O DYE: CPT

## 2018-12-19 ENCOUNTER — HOSPITAL ENCOUNTER (OUTPATIENT)
Dept: PHYSICAL THERAPY | Age: 73
Discharge: HOME | End: 2018-12-19
Payer: MEDICARE

## 2018-12-19 PROCEDURE — 97110 THERAPEUTIC EXERCISES: CPT
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
## 2018-12-27 ENCOUNTER — APPOINTMENT (OUTPATIENT)
Dept: PHYSICAL THERAPY | Age: 73
End: 2018-12-27
Payer: MEDICARE

## 2019-01-29 ENCOUNTER — HOSPITAL ENCOUNTER (OUTPATIENT)
Dept: PHYSICAL THERAPY | Age: 74
Discharge: HOME | End: 2019-01-29
Payer: MEDICARE

## 2019-01-29 PROCEDURE — 97110 THERAPEUTIC EXERCISES: CPT | Performed by: PHYSICAL THERAPIST

## 2019-01-29 PROCEDURE — 97162 PT EVAL MOD COMPLEX 30 MIN: CPT | Performed by: PHYSICAL THERAPIST
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
## 2019-01-31 ENCOUNTER — HOSPITAL ENCOUNTER (OUTPATIENT)
Dept: PHYSICAL THERAPY | Age: 74
Discharge: HOME | End: 2019-01-31
Payer: MEDICARE

## 2019-01-31 PROCEDURE — 97110 THERAPEUTIC EXERCISES: CPT

## 2019-01-31 PROCEDURE — 97035 APP MDLTY 1+ULTRASOUND EA 15: CPT

## 2019-01-31 NOTE — PROGRESS NOTES
PT DAILY TREATMENT NOTE - [facility] 2-15 Patient Name: Babatunde Del Rosario Date:2019 : 1945 [x]  Patient  Verified Payor: Ivan Ibarra / Plan: Brenda Hoffman / Product Type: Managed Care Medicare / In time:933  Out time:1033 Total Treatment Time (min): 60 Total Timed Codes (min): 60 
1:1 Treatment Time ( [address] only): 60 Visit #: 2 Treatment Area: Left knee pain [M25.562] SUBJECTIVE Pain Level (0-10 scale): 3/10 Any medication changes, allergies to medications, adverse drug reactions, diagnosis change, or new procedure performed?: [x] No    [] Yes (see summary sheet for update) Subjective functional status/changes:   [] No changes reported Patient reports she feels much better in the heel lifts and will notice a difference when taking them off. Able to go out and buy ankle weights 2 and a half pounds. OBJECTIVE Modality rationale: decrease inflammation and decrease pain to improve the patients ability to perform ADLs and reduce pain levels Type Additional Details  
[] Estim: []Att   []Unatt    []TENS instruct []IFC  []Premod   []NMES []Other:  []w/US   []w/ice   []w/heat Position: Location:  
[]  Traction: [] Cervical       []Lumbar 
                     [] Prone          []Supine []Intermittent   []Continuous Lbs: 
[] before manual 
[] after manual 
[]w/heat  
[x]  Ultrasound: []Continuous   [x] Pulsed 50% 
                    at: [x]1MHz   []3MHz Location: medial knee W/cm2: .8 [] Paraffin Location:  
[]w/heat  
[]  Ice     []  Heat 
[]  Ice massage Position: Location:  
[]  Laser 
[]  Other: Position: Location:  
[]  Vasopneumatic Device Pressure:       [] lo [] med [] hi  
Temperature:   
 
[x] Skin assessment post-treatment:  [x]intact []redness- no adverse reaction 
  []redness  adverse reaction:  
 
53 min Therapeutic Exercise:  [] See flow sheet :  
 Rationale: increase ROM and increase strength to improve the patients ability to perform ADLs and reduce pain levels With 
 [] TE 
 [] TA 
 [] neuro 
 [] other: Patient Education: [x] Review HEP [] Progressed/Changed HEP based on:  
[] positioning   [] body mechanics   [] transfers   [] heat/ice application   
[] other:   
 
Other Objective/Functional Measures: none noted Pain Level (0-10 scale) post treatment: 3/10 ASSESSMENT/Changes in Function:  
Patient demonstrates good quad control, no extension lag with leg lifts. Pain while performing standing hip abduction with the LLE. Will continue to progress as tolerated. Patient will continue to benefit from skilled PT services to modify and progress therapeutic interventions, address functional mobility deficits, address ROM deficits, address strength deficits, analyze and address soft tissue restrictions, analyze and cue movement patterns and analyze and modify body mechanics/ergonomics to attain remaining goals. [x]  See Plan of Care 
[]  See progress note/recertification 
[]  See Discharge Summary Progress towards goals / Updated goals: 
Patient is progressing towards goals. PLAN [x]  Upgrade activities as tolerated     [x]  Continue plan of care [x]  Update interventions per flow sheet      
[]  Discharge due to:_ 
[]  Other:_ Mami MARIE Isom 1/31/2019

## 2019-02-05 ENCOUNTER — HOSPITAL ENCOUNTER (OUTPATIENT)
Dept: PHYSICAL THERAPY | Age: 74
Discharge: HOME | End: 2019-02-05
Payer: MEDICARE

## 2019-02-05 PROCEDURE — 97110 THERAPEUTIC EXERCISES: CPT | Performed by: PHYSICAL THERAPIST

## 2019-02-05 NOTE — PROGRESS NOTES
PT DAILY TREATMENT NOTE - [facility] 2-15 Patient Name: Jorge Couch Date:2019 : 1945 [x]  Patient  Verified Payor: Oskar Olivas / Plan: [employer] / Product Type: Managed Care Medicare / In time: 12:02pm  Out time: 12:42pm 
Total Treatment Time (min): 40 Total Timed Codes (min): 40 
1:1 Treatment Time ( only): 40 Visit #: 3 Treatment Area: Left knee pain [M25.562] SUBJECTIVE Pain Level (0-10 scale): 2 Any medication changes, allergies to medications, adverse drug reactions, diagnosis change, or new procedure performed?: [x] No    [] Yes (see summary sheet for update) Subjective functional status/changes:   [] No changes reported The patient reports that she feels like it's getting a little better. She tried 2.5lb ankle weights at home even though she was told to use one pound. OBJECTIVE 40 min Therapeutic Exercise:  [x] See flow sheet :  
Rationale: increase ROM, increase strength and improve coordination to improve the patients ability to perform daily activities. With 
 [x] TE 
 [] TA 
 [] neuro 
 [] other: Patient Education: [x] Review HEP [x] Progressed/Changed HEP based on:  
[x] positioning   [x] body mechanics   [] transfers   [] heat/ice application   
[] other:   
 
Other Objective/Functional Measures: Pt. Tolerated therex well Pain Level (0-10 scale) post treatment: 0 
 
ASSESSMENT/Changes in Function: The patient progressed with increased resistance and stability overall.   
Patient will continue to benefit from skilled PT services to modify and progress therapeutic interventions, address functional mobility deficits, address ROM deficits, address strength deficits, analyze and address soft tissue restrictions, analyze and cue movement patterns, analyze and modify body mechanics/ergonomics, assess and modify postural abnormalities, address imbalance/dizziness and instruct in home and community integration to attain remaining goals. [x]  See Plan of Care 
[]  See progress note/recertification 
[]  See Discharge Summary Progress towards goals / Updated goals: The patient is progressing towards goals. PLAN [x]  Upgrade activities as tolerated     [x]  Continue plan of care [x]  Update interventions per flow sheet      
[]  Discharge due to:_ 
[]  Other:_   
 
Bridget Chou, PT 2/5/2019

## 2019-02-07 ENCOUNTER — HOSPITAL ENCOUNTER (OUTPATIENT)
Dept: PHYSICAL THERAPY | Age: 74
Discharge: HOME | End: 2019-02-07
Payer: MEDICARE

## 2019-02-07 PROCEDURE — 97110 THERAPEUTIC EXERCISES: CPT | Performed by: PHYSICAL THERAPIST

## 2019-02-07 PROCEDURE — 97112 NEUROMUSCULAR REEDUCATION: CPT | Performed by: PHYSICAL THERAPIST

## 2019-02-07 NOTE — PROGRESS NOTES
PT DAILY TREATMENT NOTE - [facility] 2-15 Patient Name: Praveena Griggs Date:2019 : 1945 [x]  Patient  Verified Payor: Naa Lay / Plan: Norma Sample / Product Type: Managed Care Medicare / In time: 9:00am  Out time: 9:45am 
Total Treatment Time (min): 45 Total Timed Codes (min): 40 
1:1 Treatment Time ( only): 40 Visit #:  4 Treatment Area: Left knee pain [M25.562] SUBJECTIVE Pain Level (0-10 scale): 1 Any medication changes, allergies to medications, adverse drug reactions, diagnosis change, or new procedure performed?: [x] No    [] Yes (see summary sheet for update) Subjective functional status/changes:   [] No changes reported The patient reports that she was pretty sore in the thighs yesterday, left more than right. OBJECTIVE 30 min Therapeutic Exercise:  [x] See flow sheet :  
Rationale: increase ROM, increase strength and improve coordination to improve the patients ability to perform daily activities. 15 min Neuromuscular Re-education:  [x]  See flow sheet :  
Rationale: improve coordination, improve balance and increase proprioception  to improve the patients ability to perform daily activities. With 
 [x] TE 
 [] TA 
 [] neuro 
 [] other: Patient Education: [x] Review HEP [x] Progressed/Changed HEP based on:  
[x] positioning   [x] body mechanics   [] transfers   [] heat/ice application   
[] other:   
 
Other Objective/Functional Measures: Pt. Tolerated therex well Pain Level (0-10 scale) post treatment: 1 ASSESSMENT/Changes in Function: The patient progressed with hip strengthening and dynamic stability exercises as tolerated.   
Patient will continue to benefit from skilled PT services to modify and progress therapeutic interventions, address functional mobility deficits, address ROM deficits, address strength deficits, analyze and address soft tissue restrictions, analyze and cue movement patterns, analyze and modify body mechanics/ergonomics, assess and modify postural abnormalities, address imbalance/dizziness and instruct in home and community integration to attain remaining goals. [x]  See Plan of Care 
[]  See progress note/recertification 
[]  See Discharge Summary Progress towards goals / Updated goals: The patient is progressing towards goals. PLAN [x]  Upgrade activities as tolerated     [x]  Continue plan of care [x]  Update interventions per flow sheet      
[]  Discharge due to:_ 
[]  Other:_   
 
Bridget Chou, PT 2/7/2019

## 2019-02-12 ENCOUNTER — HOSPITAL ENCOUNTER (OUTPATIENT)
Dept: PHYSICAL THERAPY | Age: 74
Discharge: HOME | End: 2019-02-12
Payer: MEDICARE

## 2019-02-12 PROCEDURE — 97110 THERAPEUTIC EXERCISES: CPT | Performed by: PHYSICAL THERAPIST

## 2019-02-12 PROCEDURE — 97112 NEUROMUSCULAR REEDUCATION: CPT | Performed by: PHYSICAL THERAPIST

## 2019-02-12 NOTE — PROGRESS NOTES
PT DAILY TREATMENT NOTE - [facility] 2-15 Patient Name: Sonny Levine Date:2019 : 1945 [x]  Patient  Verified Payor: Giuseppe Mann / Plan: Riky Bain / Product Type: Managed Care Medicare / In time: 8:57am  Out time: 9:45am 
Total Treatment Time (min): 48 Total Timed Codes (min): 48 
1:1 Treatment Time ( only): 48 Visit #:  1 Treatment Area: Left knee pain [M25.562] SUBJECTIVE Pain Level (0-10 scale): 1 Any medication changes, allergies to medications, adverse drug reactions, diagnosis change, or new procedure performed?: [x] No    [] Yes (see summary sheet for update) Subjective functional status/changes:   [] No changes reported The patient reports that she wasn't too flared up after last time. OBJECTIVE 18 min Therapeutic Exercise:  [x] See flow sheet :  
Rationale: increase ROM, increase strength and improve coordination to improve the patients ability to perform daily activities. 30 min Neuromuscular Re-education:  [x]  See flow sheet :  
Rationale: improve coordination, improve balance and increase proprioception  to improve the patients ability to perform daily activities. With 
 [x] TE 
 [] TA 
 [] neuro 
 [] other: Patient Education: [x] Review HEP [x] Progressed/Changed HEP based on:  
[x] positioning   [x] body mechanics   [] transfers   [] heat/ice application   
[] other:   
 
Other Objective/Functional Measures: Pt. Tolerated therex well Pain Level (0-10 scale) post treatment: 1 ASSESSMENT/Changes in Function: The patient progressed with dynamic stability and strengthening overall.   
Patient will continue to benefit from skilled PT services to modify and progress therapeutic interventions, address functional mobility deficits, address ROM deficits, address strength deficits, analyze and address soft tissue restrictions, analyze and cue movement patterns, analyze and modify body mechanics/ergonomics, assess and modify postural abnormalities, address imbalance/dizziness and instruct in home and community integration to attain remaining goals. [x]  See Plan of Care 
[]  See progress note/recertification 
[]  See Discharge Summary Progress towards goals / Updated goals: The patient is progressing towards goals. PLAN [x]  Upgrade activities as tolerated     [x]  Continue plan of care [x]  Update interventions per flow sheet      
[]  Discharge due to:_ 
[]  Other:_   
 
Gwendolyn Dean, PT 2/12/2019

## 2019-02-14 ENCOUNTER — HOSPITAL ENCOUNTER (OUTPATIENT)
Dept: PHYSICAL THERAPY | Age: 74
Discharge: HOME | End: 2019-02-14
Payer: MEDICARE

## 2019-02-14 PROCEDURE — 97110 THERAPEUTIC EXERCISES: CPT | Performed by: PHYSICAL THERAPIST

## 2019-02-14 PROCEDURE — 97112 NEUROMUSCULAR REEDUCATION: CPT | Performed by: PHYSICAL THERAPIST

## 2019-02-14 NOTE — PROGRESS NOTES
PT DAILY TREATMENT NOTE - [facility] 2-15 Patient Name: Ellen Meade Date:2019 : 1945 [x]  Patient  Verified Payor: Veto Perry / Plan: Kailash Zaragoza / Product Type: Managed Care Medicare / In time: 8:59am  Out time: 9:54am 
Total Treatment Time (min): 55 Total Timed Codes (min): 55 
1:1 Treatment Time ( only): 40 Visit #:  6 Treatment Area: Left knee pain [M25.562] SUBJECTIVE Pain Level (0-10 scale): 1 Any medication changes, allergies to medications, adverse drug reactions, diagnosis change, or new procedure performed?: [x] No    [] Yes (see summary sheet for update) Subjective functional status/changes:   [] No changes reported The patient reports that she wasn't flared up after last time, but it will still get her sometimes when she's stepping up. OBJECTIVE 30 min Therapeutic Exercise:  [x] See flow sheet :  
Rationale: increase ROM, increase strength and improve coordination to improve the patients ability to per 25 min Neuromuscular Re-education:  [x]  See flow sheet :  
Rationale: improve coordination, improve balance and increase proprioception  to improve the patients ability to perform daily With 
 [x] TE 
 [] TA 
 [] neuro 
 [] other: Patient Education: [x] Review HEP [x] Progressed/Changed HEP based on:  
[x] positioning   [x] body mechanics   [] transfers   [] heat/ice application   
[] other:   
 
Other Objective/Functional Measures: Pt. Tolerated therex well Pain Level (0-10 scale) post treatment:  1 ASSESSMENT/Changes in Function: The patient progressed with strengthening and dynamic weightbearing exercises today.   
Patient will continue to benefit from skilled PT services to modify and progress therapeutic interventions, address functional mobility deficits, address ROM deficits, address strength deficits, analyze and address soft tissue restrictions, analyze and cue movement patterns, analyze and modify body mechanics/ergonomics, assess and modify postural abnormalities, address imbalance/dizziness and instruct in home and community integration to attain remaining goals. [x]  See Plan of Care 
[]  See progress note/recertification 
[]  See Discharge Summary Progress towards goals / Updated goals: The patient is progressing towards goals. PLAN [x]  Upgrade activities as tolerated     [x]  Continue plan of care [x]  Update interventions per flow sheet      
[]  Discharge due to:_ 
[]  Other:_   
 
Dontae Pond, PT 2/14/2019

## 2019-02-19 ENCOUNTER — HOSPITAL ENCOUNTER (OUTPATIENT)
Dept: PHYSICAL THERAPY | Age: 74
Discharge: HOME | End: 2019-02-19
Payer: MEDICARE

## 2019-02-19 PROCEDURE — 97112 NEUROMUSCULAR REEDUCATION: CPT | Performed by: PHYSICAL THERAPIST

## 2019-02-19 PROCEDURE — 97110 THERAPEUTIC EXERCISES: CPT | Performed by: PHYSICAL THERAPIST

## 2019-02-19 NOTE — PROGRESS NOTES
PT DAILY TREATMENT NOTE - [facility] -15 Patient Name: Ana María Romeo Date:2019 : 1945 [x]  Patient  Verified Payor: Luis Eduardo Mullins / Plan: Najma Mckeon / Product Type: Managed Care Medicare / In time: 9:00am  Out time: 9:53am 
Total Treatment Time (min): 53 Total Timed Codes (min): 53 
1:1 Treatment Time ( only): 38 Visit #: 1 Treatment Area: Left knee pain [M25.562] SUBJECTIVE Pain Level (0-10 scale): 0 Any medication changes, allergies to medications, adverse drug reactions, diagnosis change, or new procedure performed?: [x] No    [] Yes (see summary sheet for update) Subjective functional status/changes:   [] No changes reported The patient reports that she still has a little twinge on the inside of her knee. OBJECTIVE 43 min Therapeutic Exercise:  [x] See flow sheet :  
Rationale: increase ROM, increase strength and improve coordination to improve the patients ability to perform daily activities. 10 min Neuromuscular Re-education:  [x]  See flow sheet :  
Rationale: improve coordination, improve balance and increase proprioception  to improve the patients ability to perform daily activities. With 
 [x] TE 
 [] TA 
 [] neuro 
 [] other: Patient Education: [x] Review HEP [x] Progressed/Changed HEP based on:  
[x] positioning   [x] body mechanics   [] transfers   [] heat/ice application   
[] other:   
 
Other Objective/Functional Measures: Pt. Tolerated therex well Pain Level (0-10 scale) post treatment: 0.5 ASSESSMENT/Changes in Function: The patient progressed with dynamic strengthening and stability as tolerated today.   
Patient will continue to benefit from skilled PT services to modify and progress therapeutic interventions, address functional mobility deficits, address ROM deficits, address strength deficits, analyze and address soft tissue restrictions, analyze and cue movement patterns, analyze and modify body mechanics/ergonomics, assess and modify postural abnormalities, address imbalance/dizziness and instruct in home and community integration to attain remaining goals. [x]  See Plan of Care 
[]  See progress note/recertification 
[]  See Discharge Summary Progress towards goals / Updated goals: The patient is progressing towards goals. PLAN [x]  Upgrade activities as tolerated     [x]  Continue plan of care [x]  Update interventions per flow sheet      
[]  Discharge due to:_ 
[]  Other:_   
 
Karla Bourne, PT 2/19/2019

## 2019-02-20 NOTE — ANCILLARY DISCHARGE INSTRUCTIONS
[facility] . [address] ([address] [place], [facility] Phone: [phone] Fax: [phone] Discharge Summary 2-15 Patient name: Martin Ryan  : 1945  Provider#: [id number] Referral source: Violetta Gay MD     
Medical/Treatment Diagnosis: Pain in right knee [M25.561] Pain in left knee [M25.562] Other abnormalities of gait and mobility [R26.89] Prior Hospitalization: see medical history Comorbidities: See Plan of Care Prior Level of Function: See Plan of Care Medications: Verified on Patient Summary List 
 
Start of Care: 18      Onset Date:10/31/18 Visits from Start of Care: 6     Missed Visits: 0 Reporting Period : 18 to 18 Assessment/Summary of care: Pt is discharged today, 2019, as they have stopped attending therapy. Final objective data and outcomes were unable to be obtained. The Pt stopped returning to therapy with this plan of care, but has since returned and is being treated under a different plan of care. Oz Hwang RECOMMENDATIONS: 
[x]Discontinue therapy: []Patient has reached or is progressing toward set goals [x]Patient is non-compliant or has abdicated 
   []Due to lack of appreciable progress towards set goals []Jerrica Lucio, PT 2019

## 2019-02-21 ENCOUNTER — HOSPITAL ENCOUNTER (OUTPATIENT)
Dept: PHYSICAL THERAPY | Age: 74
Discharge: HOME | End: 2019-02-21
Payer: MEDICARE

## 2019-02-21 PROCEDURE — 97110 THERAPEUTIC EXERCISES: CPT | Performed by: PHYSICAL THERAPIST

## 2019-02-21 NOTE — PROGRESS NOTES
PT DAILY TREATMENT NOTE - [facility] 2-15 Patient Name: Joy Closs Date:2019 : 1945 [x]  Patient  Verified Payor: Keshav Lay / Plan: Mathew Segal / Product Type: Managed Care Medicare / In time: 8:52am  Out time: 9:41am 
Total Treatment Time (min): 49 Total Timed Codes (min): 49 
1:1 Treatment Time ([address] only): 41 Visit #:  8 Treatment Area: Left knee pain [M25.562] SUBJECTIVE Pain Level (0-10 scale): 0 Any medication changes, allergies to medications, adverse drug reactions, diagnosis change, or new procedure performed?: [x] No    [] Yes (see summary sheet for update) Subjective functional status/changes:   [] No changes reported The patient reports that she felt good yesterday and wasn't too sore or painful. OBJECTIVE 49 min Therapeutic Exercise:  [x] See flow sheet :  
Rationale: increase ROM, increase strength and improve coordination to improve the patients ability to perform daily activities. With 
 [x] TE 
 [] TA 
 [] neuro 
 [] other: Patient Education: [x] Review HEP [x] Progressed/Changed HEP based on:  
[x] positioning   [x] body mechanics   [] transfers   [] heat/ice application   
[] other:   
 
Other Objective/Functional Measures: Pt. Tolerated therex well Pain Level (0-10 scale) post treatment: 0 
 
ASSESSMENT/Changes in Function: The patient progressed with an advanced HEP for lower extremity strengthening. Patient will continue to benefit from skilled PT services to modify and progress therapeutic interventions, address functional mobility deficits, address ROM deficits, address strength deficits, analyze and address soft tissue restrictions, analyze and cue movement patterns, analyze and modify body mechanics/ergonomics, assess and modify postural abnormalities, address imbalance/dizziness and instruct in home and community integration to attain remaining goals. [x]  See Plan of Care []  See progress note/recertification 
[]  See Discharge Summary Progress towards goals / Updated goals: The patient is progressing towards goals. PLAN [x]  Upgrade activities as tolerated     [x]  Continue plan of care [x]  Update interventions per flow sheet      
[]  Discharge due to:_ 
[]  Other:_   
 
Lee Ann Adams, PT 2/21/2019

## 2019-02-26 ENCOUNTER — HOSPITAL ENCOUNTER (OUTPATIENT)
Dept: PHYSICAL THERAPY | Age: 74
Discharge: HOME | End: 2019-02-26
Payer: MEDICARE

## 2019-02-26 PROCEDURE — 97110 THERAPEUTIC EXERCISES: CPT | Performed by: PHYSICAL THERAPIST

## 2019-02-26 PROCEDURE — 97112 NEUROMUSCULAR REEDUCATION: CPT | Performed by: PHYSICAL THERAPIST

## 2019-02-26 NOTE — PROGRESS NOTES
PT DAILY TREATMENT NOTE - [facility] 2-15 Patient Name: Kaleb Marinelli Date:2019 : 1945 [x]  Patient  Verified Payor: Filemon Navarro / Plan:  Jessica / Product Type: Managed Care Medicare / In time: 8:55am  Out time: 9:40am 
Total Treatment Time (min): 45 Total Timed Codes (min): 45 
1:1 Treatment Time ( only): 45 Visit #: 9 Treatment Area: Left knee pain [M25.562] SUBJECTIVE Pain Level (0-10 scale): 0 Any medication changes, allergies to medications, adverse drug reactions, diagnosis change, or new procedure performed?: [x] No    [] Yes (see summary sheet for update) Subjective functional status/changes:   [] No changes reported The patient reports that she felt great over the weekend, no issues or pain with daily activities. OBJECTIVE 30 min Therapeutic Exercise:  [x] See flow sheet :  
Rationale: increase ROM, increase strength and improve coordination to improve the patients ability to perform daily activities. 15 min Neuromuscular Re-education:  [x]  See flow sheet :  
Rationale: improve coordination, improve balance and increase proprioception  to improve the patients ability to perform daily activities. With 
 [x] TE 
 [] TA 
 [] neuro 
 [] other: Patient Education: [x] Review HEP [x] Progressed/Changed HEP based on:  
[x] positioning   [x] body mechanics   [] transfers   [] heat/ice application   
[] other:   
 
Other Objective/Functional Measures: FOTO= 89 (51 at eval) Pain Level (0-10 scale) post treatment: 0 
 
ASSESSMENT/Changes in Function: The patient has progressed very well and MET goals and will be discharged today. []  See Plan of Care 
[]  See progress note/recertification 
[x]  See Discharge Summary Progress towards goals / Updated goals: The patient has MET goals. PLAN 
[]  Upgrade activities as tolerated     []  Continue plan of care 
[]  Update interventions per flow sheet [x]  Discharge due to: Pt. Has MET goals. []  Other:_   
 
Benjy Nevarez, PT 2/26/2019

## 2019-02-26 NOTE — ANCILLARY DISCHARGE INSTRUCTIONS
[employer] [facility] [address] [place] Phone: [phone] Fax: [phone] Discharge Summary 2-15 Patient name: Aminata Lucas  : 1945  Provider#: [id number] Referral source: Ilene Aponte MD     
Medical/Treatment Diagnosis: Left knee pain [M25.562] Prior Hospitalization: see medical history Comorbidities: See Plan of Care Prior Level of Function: See Plan of Care Medications: Verified on Patient Summary List 
 
Start of Care: 19      Onset Date: 2018 Visits from Start of Care: 9     Missed Visits: 0 Reporting Period : 19 to 19 Assessment/Summary of care: The patient has progressed very well with her left knee pain from a lateral meniscal tear and chondrol derangements. She has improved her lower extremity strengthening and stability, and has improved with her tolerance to weightbearing activities and stair negotiation. She has an advanced HEP that she will continue to utilize to maintain improvements made thus far. Short Term Goals: To be accomplished in 2 treatments: 
                       1.) The patient will be independent with their HEP consistently for at least one week. - MET Long Term Goals: To be accomplished in 16 treatments: 
                       3.) The patient will have at most 2/10 pain with daily household activities. - MET 
                       1.) The patient will be able to ambulate for at least 10min with at most 2/10 pain. - MET 
                       0.) The patient will improve their FOTO score from 51 to at least 56 to show improvements in functional mobility.- MET (89) RECOMMENDATIONS: 
[x]Discontinue therapy: [x]Patient has reached or is progressing toward set goals []Patient is non-compliant or has abdicated 
   []Due to lack of appreciable progress towards set goals []Other Daniela Mcneil, PT 2019

## 2019-02-28 ENCOUNTER — APPOINTMENT (OUTPATIENT)
Dept: PHYSICAL THERAPY | Age: 74
End: 2019-02-28
Payer: MEDICARE

## 2019-03-05 ENCOUNTER — APPOINTMENT (OUTPATIENT)
Dept: PHYSICAL THERAPY | Age: 74
End: 2019-03-05

## 2019-03-07 ENCOUNTER — APPOINTMENT (OUTPATIENT)
Dept: PHYSICAL THERAPY | Age: 74
End: 2019-03-07

## 2019-05-06 ENCOUNTER — DOCUMENTATION ONLY (OUTPATIENT)
Dept: INTERNAL MEDICINE CLINIC | Age: 74
End: 2019-05-06

## 2019-05-06 NOTE — PROGRESS NOTES
Medicare Part B Preventive Services Guidelines/Limitations Date last completed and Frequency Due Date   Bone Mass Measurement  (age 72 & older, biennial) Requires diagnosis related to osteoporosis or estrogen deficiency. Biennial benefit unless patient has history of long-term glucocorticoid tx or baseline is needed because initial test was by other method Completed 5/2018      Recommended every 2 years Due 5/2019   Cardiovascular Screening Blood Tests (every 5 years)  Total cholesterol, HDL, Triglycerides Order as a panel if possible Completed 11/2018      Recommended annually Due 11/2019   Colorectal Cancer Screening  -Fecal occult blood test (annual)  -Flexible sigmoidoscopy (5y)  -Screening colonoscopy (10y)  -Barium Enema    Completed 10/07/14 with Dr. Rose Mcclellan      Recommended repeat in 5 years  Due 10/2019   Counseling to Prevent Tobacco Use (up to 8 sessions per year)  - Counseling greater than 3 and up to 10 minutes  - Counseling greater than 10 minutes Patients must be asymptomatic of tobacco-related conditions to receive as preventive service N/A N/A   Diabetes Screening Tests (at least every 3 years, Medicare covers annually or at 6-month intervals for prediabetic patients)      Fasting blood sugar (FBS) or glucose tolerance test (GTT) Patient must be diagnosed with one of the following:  -Hypertension, Dyslipidemia, obesity, previous impaired FBS or GTT  Or any two of the following: overweight, FH of diabetes, age ? 72, history of gestational diabetes, birth of baby weighing more than 9 pounds Completed 11/2018 with A1C 5.9      Recommended every 3-6 months for Pre-Diabetics and Diabetics Due now   Diabetes Self-Management Training (DSMT) (no USPSTF recommendation) Requires referral by treating physician for patient with diabetes or renal disease. 10 hours of initial DSMT session of no less than 30 minutes each in a continuous 12-month period. 2 hours of follow-up DSMT in subsequent years.  N/A N/A Glaucoma Screening (no USPSTF recommendation) Diabetes mellitus, family history, , age 48 or over,  American, age 72 or over Completed 3/2018      Recommended annually Due now   Human Immunodeficiency Virus (HIV) Screening (annually for increased risk patients)  HIV-1 and HIV-2 by EIA, CRISTY, rapid antibody test, or oral mucosa transudate Patient must be at increased risk for HIV infection per USPSTF guidelines or pregnant. Tests covered annually for patients at increased risk. Pregnant patients may receive up to 3 test during pregnancy. N/A N/A   Medical Nutrition Therapy (MNT) (for diabetes or renal disease not recommended schedule) Requires referral by treating physician for patient with diabetes or renal disease. Can be provided in same year as diabetes self-management training (DSMT), and CMS recommends medical nutrition therapy take place after DSMT. Up to 3 hours for initial year and 2 hours in subsequent years. N/A N/A         Seasonal Influenza Vaccination (annually)    Completed Fall 2018      Recommended annually Due Fall 2019   Pneumococcal Vaccination (once after 72)    Pneumococcal 23 - 9/02/2014      Prevnar 13 -  11/03/2016      Both recommended once over the age of 72 Completed            Completed   Hepatitis B Vaccinations (if medium/high risk) Medium/high risk factors: End-stage renal disease,  Hemophiliacs who received Factor VIII or IX concentrates, Clients of institutions for the mentally retarded, Persons who live in the same house as a HepB virus carrier, Homosexual men, Illicit injectable drug abusers. N/A N/A   Screening Mammography (biennial age 54-69)?  Annually (age 36 or over) Completed 11/2018      Recommended annually Due 11/2019   Screening Pap Tests and Pelvic Examination (up to age 79 and after 79 if unknown history or abnormal study last 10 years) Every 24 months except high risk Completed 7/2015      Recommended every other year  Due now   Ultrasound Screening for Abdominal Aortic Aneurysm (AAA) (once) Patient must be referred through IPPE and not have had a screening for abdominal aortic aneurysm before under Medicare.  Limited to patients who meet one of the following criteria:  - Men who are 73-68 years old and have smoked more than 100 cigarettes in their lifetime.  -Anyone with a FH of AAA  -Anyone recommended for screening by USPSTF Completed US retroperitoneum 11/2017 - negative Completed

## 2019-05-07 ENCOUNTER — OFFICE VISIT (OUTPATIENT)
Dept: INTERNAL MEDICINE CLINIC | Age: 74
End: 2019-05-07

## 2019-05-07 VITALS
TEMPERATURE: 98 F | DIASTOLIC BLOOD PRESSURE: 63 MMHG | HEART RATE: 77 BPM | BODY MASS INDEX: 22.76 KG/M2 | RESPIRATION RATE: 16 BRPM | WEIGHT: 145 LBS | SYSTOLIC BLOOD PRESSURE: 103 MMHG | HEIGHT: 67 IN | OXYGEN SATURATION: 97 %

## 2019-05-07 DIAGNOSIS — I25.10 CORONARY ARTERY DISEASE INVOLVING NATIVE CORONARY ARTERY OF NATIVE HEART WITHOUT ANGINA PECTORIS: ICD-10-CM

## 2019-05-07 DIAGNOSIS — Z12.31 ENCOUNTER FOR SCREENING MAMMOGRAM FOR MALIGNANT NEOPLASM OF BREAST: ICD-10-CM

## 2019-05-07 DIAGNOSIS — M79.671 PAIN OF BOTH HEELS: ICD-10-CM

## 2019-05-07 DIAGNOSIS — F51.01 PRIMARY INSOMNIA: ICD-10-CM

## 2019-05-07 DIAGNOSIS — R73.01 IFG (IMPAIRED FASTING GLUCOSE): ICD-10-CM

## 2019-05-07 DIAGNOSIS — M79.672 PAIN OF BOTH HEELS: ICD-10-CM

## 2019-05-07 DIAGNOSIS — E78.00 PURE HYPERCHOLESTEROLEMIA: ICD-10-CM

## 2019-05-07 DIAGNOSIS — I10 ESSENTIAL HYPERTENSION: Primary | ICD-10-CM

## 2019-05-07 DIAGNOSIS — Z00.00 MEDICARE ANNUAL WELLNESS VISIT, SUBSEQUENT: ICD-10-CM

## 2019-05-07 RX ORDER — TRIAMTERENE AND HYDROCHLOROTHIAZIDE 37.5; 25 MG/1; MG/1
1 CAPSULE ORAL DAILY
Qty: 90 CAP | Refills: 1 | Status: SHIPPED | OUTPATIENT
Start: 2019-05-07 | End: 2019-05-15 | Stop reason: SDUPTHER

## 2019-05-07 RX ORDER — FELODIPINE 5 MG/1
5 TABLET, EXTENDED RELEASE ORAL DAILY
Qty: 90 TAB | Refills: 1 | Status: SHIPPED | OUTPATIENT
Start: 2019-05-07 | End: 2019-05-15 | Stop reason: SDUPTHER

## 2019-05-07 RX ORDER — PANTOPRAZOLE SODIUM 20 MG/1
20 TABLET, DELAYED RELEASE ORAL DAILY
Qty: 90 TAB | Refills: 1 | Status: SHIPPED | OUTPATIENT
Start: 2019-05-07 | End: 2019-05-15 | Stop reason: SDUPTHER

## 2019-05-07 RX ORDER — AMITRIPTYLINE HYDROCHLORIDE 10 MG/1
10 TABLET, FILM COATED ORAL
Qty: 30 TAB | Refills: 1 | Status: SHIPPED | OUTPATIENT
Start: 2019-05-07 | End: 2019-05-15 | Stop reason: SDUPTHER

## 2019-05-07 RX ORDER — TRIAMTERENE AND HYDROCHLOROTHIAZIDE 37.5; 25 MG/1; MG/1
1 CAPSULE ORAL DAILY
Qty: 90 CAP | Refills: 1 | Status: SHIPPED | OUTPATIENT
Start: 2019-05-07 | End: 2019-05-07 | Stop reason: SDUPTHER

## 2019-05-07 RX ORDER — ATORVASTATIN CALCIUM 20 MG/1
20 TABLET, FILM COATED ORAL DAILY
Qty: 90 TAB | Refills: 1 | Status: SHIPPED | OUTPATIENT
Start: 2019-05-07 | End: 2019-05-15 | Stop reason: SDUPTHER

## 2019-05-07 RX ORDER — FELODIPINE 5 MG/1
5 TABLET, EXTENDED RELEASE ORAL DAILY
Qty: 90 TAB | Refills: 1 | Status: SHIPPED | OUTPATIENT
Start: 2019-05-07 | End: 2019-05-07 | Stop reason: SDUPTHER

## 2019-05-07 RX ORDER — ATORVASTATIN CALCIUM 20 MG/1
20 TABLET, FILM COATED ORAL DAILY
Qty: 90 TAB | Refills: 1 | Status: SHIPPED | OUTPATIENT
Start: 2019-05-07 | End: 2019-05-07 | Stop reason: SDUPTHER

## 2019-05-07 NOTE — PROGRESS NOTES
This is the Subsequent Medicare Annual Wellness Exam, performed 12 months or more after the Initial AWV or the last Subsequent AWV I have reviewed the patient's medical history in detail and updated the computerized patient record. History Past Medical History:  
Diagnosis Date  Dyspepsia and other specified disorders of function of stomach  GERD (gastroesophageal reflux disease)  H/O allergy  Hypercholesterolemia  Hypertension  Indigestion  Other ill-defined conditions(799.89)   
 elevated cholesterol Past Surgical History:  
Procedure Laterality Date  ABDOMEN SURGERY PROC UNLISTED  14 LAPAROSCOPIC CHOLECYSTECTOMY with GRAMS  
 HX  SECTION    
 HX CHOLECYSTECTOMY  14  
 lap mando with cholangiogram  
 HX HYSTERECTOMY  HX OTHER SURGICAL  14 ERCPwith biliary sphincterotomy CBD balloon sweeps  HX TUBAL LIGATION Current Outpatient Medications Medication Sig Dispense Refill  pantoprazole (PROTONIX) 20 mg tablet TAKE 1 TABLET DAILY 30 Tab 0  cyclobenzaprine (FLEXERIL) 5 mg tablet Take 1 Tab by mouth nightly. 20 Tab 0  ibuprofen (MOTRIN) 800 mg tablet Take 1 Tab by mouth every eight (8) hours as needed for Pain. 30 Tab 0  
 methylPREDNISolone (MEDROL DOSEPACK) 4 mg tablet Per pack 1 Dose Pack 0  
 felodipine (PLENDIL SR) 5 mg 24 hr tablet Take 1 Tab by mouth daily. 90 Tab 1  
 triamterene-hydroCHLOROthiazide (DYAZIDE) 37.5-25 mg per capsule Take 1 Cap by mouth daily. 90 Cap 1  
 atorvastatin (LIPITOR) 20 mg tablet Take 1 Tab by mouth daily. 90 Tab 1  
 methylPREDNISolone (MEDROL DOSEPACK) 4 mg tablet Per pack 1 Dose Pack 0  
 Ketoconazole 2 % topical foam Apply  to affected area two (2) times a day. 100 g 0  
 dorzolamide-timolol (COSOPT) 22.3-6.8 mg/mL ophthalmic solution  Cholecalciferol, Vitamin D3, (VITAMIN D3) 1,000 unit cap Take  by mouth.  fexofenadine (ALLEGRA) 180 mg tablet Take 180 mg by mouth daily.  aspirin 81 mg tablet Take 81 mg by mouth daily. No Known Allergies Family History Problem Relation Age of Onset  Hypertension Mother  Cancer Brother   
     prostate Social History Tobacco Use  Smoking status: Never Smoker  Smokeless tobacco: Never Used Substance Use Topics  Alcohol use: No  
 
Patient Active Problem List  
Diagnosis Code  H/O allergy Z88.9  Hypertension I10  
 Indigestion K30  
 CAD (coronary artery disease) I25.10  Hyperlipidemia E78.5  S/P laparoscopic cholecystectomy Z90.49  Prediabetes R73.03  
 ACP (advance care planning) Z71.89  
 Angioedema T78. 3XXA Depression Risk Factor Screening:  
 
3 most recent PHQ Screens 5/7/2019 Little interest or pleasure in doing things Not at all Feeling down, depressed, irritable, or hopeless Not at all Total Score PHQ 2 0 Alcohol Risk Factor Screening: You do not drink alcohol or very rarely. Functional Ability and Level of Safety:  
Hearing Loss Hearing is good. Activities of Daily Living The home contains: no safety equipment. Patient does total self care Fall Risk Fall Risk Assessment, last 12 mths 5/7/2019 Able to walk? Yes Fall in past 12 months? No  
 
 
Abuse Screen Patient is not abused Lives with  safe Cognitive Screening Evaluation of Cognitive Function: 
Has your family/caregiver stated any concerns about your memory: no 
Normal 
 
Patient Care Team  
Patient Care Team: 
Thomas Sheppard MD as PCP - General (Internal Medicine) Nai Otero MD as Consulting Provider (Obstetrics & Gynecology) Tena Antony MD (Gastroenterology) Deandra Camara  (Ophthalmology) Nikolai Byrd MD (Urology)  
updated Assessment/Plan Education and counseling provided: 
Are appropriate based on today's review and evaluation Screening Pap and pelvic (covered once every 2 years) Bone mass measurement (DEXA) Screening for glaucoma Diabetes screening test 
 
Diagnoses and all orders for this visit: 1. Essential hypertension 2. Pure hypercholesterolemia 3. Coronary artery disease involving native coronary artery of native heart without angina pectoris 4. Medicare annual wellness visit, subsequent 5. Encounter for screening mammogram for malignant neoplasm of breast 
-     WIN MAMMO BI SCREENING INCL CAD; Future Health Maintenance Due Topic Date Due  GLAUCOMA SCREENING Q2Y  03/17/2018  MEDICARE YEARLY EXAM  05/02/2019 ACP on file.  SDM is her  Xavier Mary).    
  
 
   
   
Colonoscopy: 10/7/14, Dr. Santosh Pagan, repeat 5 years, due 10/19 AAA: CT abd 10/18, negative Pap: Dr. Michelle Cintron, 7/15,  every other year, hysterectomy and BSO in 1994, due Mammogram: 11/26/18, negative annual 
DEXA: 5/8/18, osteopenia, low FRAX due 5/20 Tdap: 4/08/2015 Pneumovax: 9/02/2014 Ithemko76: 11/03/2016 Zostavax: 2010   
Shingrix: 1st round completed 6/18, 2nd round backordered Flu shot: 11/06/18  
 
A1c: 11/18 5.9 due now Eye exam: Dr. Dara CURRIE, 11/18, will get notes for review Lipids: 11/18 LDL 76  Annual  
 
Medication reconciliation completed by MA and reviewed by me. Medical/surgical/social/family history reviewed and updated by me. Patient provided AVS and preventative screening table. Patient verbalized understanding of all information discussed.
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
## 2019-05-07 NOTE — PATIENT INSTRUCTIONS
Medicare Part B Preventive Services Guidelines/Limitations Date last completed and Frequency Due Date Bone Mass Measurement 
(age 72 & older, biennial) Requires diagnosis related to osteoporosis or estrogen deficiency. Biennial benefit unless patient has history of long-term glucocorticoid tx or baseline is needed because initial test was by other method Completed 5/2018 
  
Recommended every 2 years Due 5/2020 Cardiovascular Screening Blood Tests (every 5 years) Total cholesterol, HDL, Triglycerides Order as a panel if possible Completed 11/2018 
  
Recommended annually Due 11/2019 Colorectal Cancer Screening 
-Fecal occult blood test (annual) -Flexible sigmoidoscopy (5y) 
-Screening colonoscopy (10y) -Barium Enema   Completed 10/07/14 with Dr. Malik Tidwell 
  
Recommended repeat in 5 years  Due 10/2019 Counseling to Prevent Tobacco Use (up to 8 sessions per year) - Counseling greater than 3 and up to 10 minutes - Counseling greater than 10 minutes Patients must be asymptomatic of tobacco-related conditions to receive as preventive service N/A N/A Diabetes Screening Tests (at least every 3 years, Medicare covers annually or at 6-month intervals for prediabetic patients) 
  Fasting blood sugar (FBS) or glucose tolerance test (GTT) Patient must be diagnosed with one of the following: 
-Hypertension, Dyslipidemia, obesity, previous impaired FBS or GTT 
Or any two of the following: overweight, FH of diabetes, age ? 72, history of gestational diabetes, birth of baby weighing more than 9 pounds Completed 11/2018 with A1C 5.9 
  
Recommended every 3-6 months for Pre-Diabetics and Diabetics Due now Diabetes Self-Management Training (DSMT) (no USPSTF recommendation) Requires referral by treating physician for patient with diabetes or renal disease. 10 hours of initial DSMT session of no less than 30 minutes each in a continuous 12-month period. 2 hours of follow-up DSMT in subsequent years.  N/A N/A  
 Glaucoma Screening (no USPSTF recommendation) Diabetes mellitus, family history, , age 48 or over,  American, age 72 or over [id number]/2018 
  
Recommended annually Due annually Human Immunodeficiency Virus (HIV) Screening (annually for increased risk patients) HIV-1 and HIV-2 by EIA, CRISTY, rapid antibody test, or oral mucosa transudate Patient must be at increased risk for HIV infection per USPSTF guidelines or pregnant. Tests covered annually for patients at increased risk. Pregnant patients may receive up to 3 test during pregnancy. N/A N/A Medical Nutrition Therapy (MNT) (for diabetes or renal disease not recommended schedule) Requires referral by treating physician for patient with diabetes or renal disease. Can be provided in same year as diabetes self-management training (DSMT), and CMS recommends medical nutrition therapy take place after DSMT. Up to 3 hours for initial year and 2 hours in subsequent years. N/A N/A  
         
Seasonal Influenza Vaccination (annually)   Completed Fall 2018 
  
Recommended annually Due Fall 2019 Pneumococcal Vaccination (once after 65)   Pneumococcal 23 - 9/02/2014 
  
Prevnar 13 - 
11/03/2016  
  
Both recommended once over the age of 72 Completed   
  
  
Completed Hepatitis B Vaccinations (if medium/high risk) Medium/high risk factors: End-stage renal disease, Hemophiliacs who received Factor VIII or IX concentrates, Clients of institutions for the mentally retarded, Persons who live in the same house as a HepB virus carrier, Homosexual men, Illicit injectable drug abusers. N/A N/A Screening Mammography (biennial age 54-69)? Annually (age 36 or over) Completed 11/2018 
  
Recommended annually Due 11/2019 Screening Pap Tests and Pelvic Examination (up to age 79 and after 79 if unknown history or abnormal study last 10 years) Every 24 months except high risk Completed 7/2015 
  
Recommended every other year  Due now Ultrasound Screening for Abdominal Aortic Aneurysm (AAA) (once) Patient must be referred through IPPE and not have had a screening for abdominal aortic aneurysm before under Medicare. Limited to patients who meet one of the following criteria: 
- Men who are 73-68 years old and have smoked more than 100 cigarettes in their lifetime. 
-Anyone with a FH of AAA 
-Anyone recommended for screening by USPSTF Completed US retroperitoneum 11/2017 - negative Completed   
  
 
Medicare Wellness Visit, Female The best way to live healthy is to have a lifestyle where you eat a well-balanced diet, exercise regularly, limit alcohol use, and quit all forms of tobacco/nicotine, if applicable. Regular preventive services are another way to keep healthy. Preventive services (vaccines, screening tests, monitoring & exams) can help personalize your care plan, which helps you manage your own care. Screening tests can find health problems at the earliest stages, when they are easiest to treat. [facility] follows the current, evidence-based guidelines published by the [facility] Fabian Lozada (USPSTF) when recommending preventive services for our patients. Because we follow these guidelines, sometimes recommendations change over time as research supports it. (For example, mammograms used to be recommended annually. Even though Medicare will still pay for an annual mammogram, the newer guidelines recommend a mammogram every two years for women of average risk.) Of course, you and your doctor may decide to screen more often for some diseases, based on your risk and your health status. Preventive services for you include: - Medicare offers their members a free annual wellness visit, which is time for you and your primary care provider to discuss and plan for your preventive service needs.  Take advantage of this benefit every year! 
-All adults over the age of 72 should receive the recommended pneumonia vaccines. Current USPSTF guidelines recommend a series of two vaccines for the best pneumonia protection.  
-All adults should have a flu vaccine yearly and a tetanus vaccine every 10 years. All adults age 61 and older should receive a shingles vaccine once in their lifetime.   
-A bone mass density test is recommended when a woman turns 65 to screen for osteoporosis. This test is only recommended one time, as a screening. Some providers will use this same test as a disease monitoring tool if you already have osteoporosis. -All adults age 38-68 who are overweight should have a diabetes screening test once every three years.  
-Other screening tests and preventive services for persons with diabetes include: an eye exam to screen for diabetic retinopathy, a kidney function test, a foot exam, and stricter control over your cholesterol.  
-Cardiovascular screening for adults with routine risk involves an electrocardiogram (ECG) at intervals determined by your doctor.  
-Colorectal cancer screenings should be done for adults age 54-65 with no increased risk factors for colorectal cancer. There are a number of acceptable methods of screening for this type of cancer. Each test has its own benefits and drawbacks. Discuss with your doctor what is most appropriate for you during your annual wellness visit. The different tests include: colonoscopy (considered the best screening method), a fecal occult blood test, a fecal DNA test, and sigmoidoscopy. -Breast cancer screenings are recommended every other year for women of normal risk, age 54-69. 
-Cervical cancer screenings for women over age 72 are only recommended with certain risk factors.  
-All adults born between [facility] should be screened once for Hepatitis C. Here is a list of your current Health Maintenance items (your personalized list of preventive services) with a due date: 
Health Maintenance Due Topic Date Due  Glaucoma Screening   03/17/2018 Monika.Preet Annual Well Visit  05/02/2019

## 2019-05-08 LAB
ALBUMIN SERPL-MCNC: 4.3 G/DL (ref 3.5–4.8)
ALBUMIN/GLOB SERPL: 1.5 {RATIO} (ref 1.2–2.2)
ALP SERPL-CCNC: 67 IU/L (ref 39–117)
ALT SERPL-CCNC: 17 IU/L (ref 0–32)
AST SERPL-CCNC: 15 IU/L (ref 0–40)
BILIRUB SERPL-MCNC: 0.4 MG/DL (ref 0–1.2)
BUN SERPL-MCNC: 23 MG/DL (ref 8–27)
BUN/CREAT SERPL: 22 (ref 12–28)
CALCIUM SERPL-MCNC: 9.9 MG/DL (ref 8.7–10.3)
CHLORIDE SERPL-SCNC: 101 MMOL/L (ref 96–106)
CO2 SERPL-SCNC: 25 MMOL/L (ref 20–29)
CREAT SERPL-MCNC: 1.06 MG/DL (ref 0.57–1)
EST. AVERAGE GLUCOSE BLD GHB EST-MCNC: 123 MG/DL
GLOBULIN SER CALC-MCNC: 2.9 G/DL (ref 1.5–4.5)
GLUCOSE SERPL-MCNC: 130 MG/DL (ref 65–99)
HBA1C MFR BLD: 5.9 % (ref 4.8–5.6)
POTASSIUM SERPL-SCNC: 3.2 MMOL/L (ref 3.5–5.2)
PROT SERPL-MCNC: 7.2 G/DL (ref 6–8.5)
SODIUM SERPL-SCNC: 142 MMOL/L (ref 134–144)

## 2019-05-08 NOTE — PROGRESS NOTES
k low on recent labs, was fine before Increase k containing foods Repeat bmp in 2-3 weeks if not improved will start daily supplement then Rest fine

## 2019-05-09 DIAGNOSIS — E87.6 LOW BLOOD POTASSIUM: Primary | ICD-10-CM

## 2019-05-09 NOTE — PROGRESS NOTES
Spoke to pt. Reported k low on recent labs, was fine before Increase k containing foods Repeat bmp in 2-3 weeks if not improved will start daily supplement then Labs ordered Rest fine. No further questions or concerns at time of call.

## 2019-05-09 NOTE — TELEPHONE ENCOUNTER
Pt states the scripts from 5-7-19 was to have gone to [facility] and not [facility].   Can you re route that for pt? Thanks.

## 2019-05-15 RX ORDER — PANTOPRAZOLE SODIUM 20 MG/1
20 TABLET, DELAYED RELEASE ORAL DAILY
Qty: 90 TAB | Refills: 1 | Status: SHIPPED | OUTPATIENT
Start: 2019-05-15 | End: 2019-11-05

## 2019-05-15 RX ORDER — AMITRIPTYLINE HYDROCHLORIDE 10 MG/1
10 TABLET, FILM COATED ORAL
Qty: 90 TAB | Refills: 1 | Status: SHIPPED | OUTPATIENT
Start: 2019-05-15 | End: 2020-02-11

## 2019-05-15 RX ORDER — ATORVASTATIN CALCIUM 20 MG/1
20 TABLET, FILM COATED ORAL DAILY
Qty: 90 TAB | Refills: 1 | Status: SHIPPED | OUTPATIENT
Start: 2019-05-15 | End: 2020-01-26

## 2019-05-15 RX ORDER — FELODIPINE 5 MG/1
5 TABLET, EXTENDED RELEASE ORAL DAILY
Qty: 90 TAB | Refills: 1 | Status: SHIPPED | OUTPATIENT
Start: 2019-05-15 | End: 2020-01-26

## 2019-05-15 RX ORDER — TRIAMTERENE AND HYDROCHLOROTHIAZIDE 37.5; 25 MG/1; MG/1
1 CAPSULE ORAL DAILY
Qty: 90 CAP | Refills: 1 | Status: SHIPPED | OUTPATIENT
Start: 2019-05-15 | End: 2020-01-26

## 2019-05-15 NOTE — TELEPHONE ENCOUNTER
Patient states medication,amitriptyline (ELAVIL) 10 mg tablet, needs to got to [facility] that was sent to [facility] on 5/7/19. Patient states she received call from [facility] that 3 of her medications were ready & patient reports she cancelled them. Patient states this medication needs to be sent to [place] as the others are showing were done on 5/7/19. Please call if any questions.  Thank you

## 2019-10-31 DIAGNOSIS — E87.6 LOW BLOOD POTASSIUM: ICD-10-CM

## 2019-11-01 LAB
BUN SERPL-MCNC: 20 MG/DL (ref 8–27)
BUN/CREAT SERPL: 17 (ref 12–28)
CALCIUM SERPL-MCNC: 9.6 MG/DL (ref 8.7–10.3)
CHLORIDE SERPL-SCNC: 102 MMOL/L (ref 96–106)
CO2 SERPL-SCNC: 26 MMOL/L (ref 20–29)
CREAT SERPL-MCNC: 1.16 MG/DL (ref 0.57–1)
GLUCOSE SERPL-MCNC: 96 MG/DL (ref 65–99)
POTASSIUM SERPL-SCNC: 3.6 MMOL/L (ref 3.5–5.2)
SODIUM SERPL-SCNC: 144 MMOL/L (ref 134–144)
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
## 2019-11-05 ENCOUNTER — OFFICE VISIT (OUTPATIENT)
Dept: INTERNAL MEDICINE CLINIC | Age: 74
End: 2019-11-05

## 2019-11-05 VITALS
RESPIRATION RATE: 16 BRPM | TEMPERATURE: 97.4 F | WEIGHT: 146 LBS | HEIGHT: 67 IN | HEART RATE: 70 BPM | BODY MASS INDEX: 22.91 KG/M2 | DIASTOLIC BLOOD PRESSURE: 73 MMHG | SYSTOLIC BLOOD PRESSURE: 126 MMHG | OXYGEN SATURATION: 95 %

## 2019-11-05 DIAGNOSIS — N28.1 RENAL CYST: ICD-10-CM

## 2019-11-05 DIAGNOSIS — Z23 ENCOUNTER FOR IMMUNIZATION: ICD-10-CM

## 2019-11-05 DIAGNOSIS — I10 ESSENTIAL HYPERTENSION: Primary | ICD-10-CM

## 2019-11-05 DIAGNOSIS — I25.10 CORONARY ARTERY DISEASE INVOLVING NATIVE CORONARY ARTERY OF NATIVE HEART WITHOUT ANGINA PECTORIS: ICD-10-CM

## 2019-11-05 DIAGNOSIS — E78.00 PURE HYPERCHOLESTEROLEMIA: ICD-10-CM

## 2019-11-05 DIAGNOSIS — R73.01 IFG (IMPAIRED FASTING GLUCOSE): ICD-10-CM

## 2019-11-05 DIAGNOSIS — F51.01 PRIMARY INSOMNIA: ICD-10-CM

## 2019-11-05 NOTE — PROGRESS NOTES
After obtaining consent, and per verbal order from Dr. Rodman Boxer, patient received influenza vaccine given by Elif Dela Cruz in left Deltoid. Influenza Vaccine 0.5 mL IM now. Patient was observed for 10 minutes post injection. Patient tolerated injection well. VIS given.

## 2019-11-06 LAB
ALBUMIN SERPL-MCNC: 4.2 G/DL (ref 3.5–4.8)
ALP SERPL-CCNC: 65 IU/L (ref 39–117)
ALT SERPL-CCNC: 17 IU/L (ref 0–32)
AST SERPL-CCNC: 13 IU/L (ref 0–40)
BILIRUB DIRECT SERPL-MCNC: 0.1 MG/DL (ref 0–0.4)
BILIRUB SERPL-MCNC: 0.3 MG/DL (ref 0–1.2)
CHOLEST SERPL-MCNC: 138 MG/DL (ref 100–199)
ERYTHROCYTE [DISTWIDTH] IN BLOOD BY AUTOMATED COUNT: 15.6 % (ref 12.3–15.4)
EST. AVERAGE GLUCOSE BLD GHB EST-MCNC: 126 MG/DL
HBA1C MFR BLD: 6 % (ref 4.8–5.6)
HCT VFR BLD AUTO: 39.5 % (ref 34–46.6)
HDLC SERPL-MCNC: 48 MG/DL
HGB BLD-MCNC: 11.8 G/DL (ref 11.1–15.9)
LDLC SERPL CALC-MCNC: 75 MG/DL (ref 0–99)
MCH RBC QN AUTO: 22.6 PG (ref 26.6–33)
MCHC RBC AUTO-ENTMCNC: 29.9 G/DL (ref 31.5–35.7)
MCV RBC AUTO: 76 FL (ref 79–97)
PLATELET # BLD AUTO: 272 X10E3/UL (ref 150–450)
PROT SERPL-MCNC: 7.1 G/DL (ref 6–8.5)
RBC # BLD AUTO: 5.22 X10E6/UL (ref 3.77–5.28)
TRIGL SERPL-MCNC: 76 MG/DL (ref 0–149)
TSH SERPL DL<=0.005 MIU/L-ACNC: 1.52 UIU/ML (ref 0.45–4.5)
VLDLC SERPL CALC-MCNC: 15 MG/DL (ref 5–40)
WBC # BLD AUTO: 7.3 X10E3/UL (ref 3.4–10.8)

## 2019-11-26 NOTE — PERIOP NOTES
[facility]  Ambulatory Surgery Unit  Pre-operative Instructions for Endo Procedures    Procedure Date  12/5           Tentative Arrival Time 0815      1. On the day of your procedure, please report to the Ambulatory Surgery Unit Registration Desk and sign in at your designated time. The Ambulatory Surgery Unit is located in [facility] on the [facility] side of the [facility] across from the [address] [place] [address]. Please have all of your health insurance cards and a photo ID. 2. You must have someone with you to drive you home, as you should not drive a car for 24 hours following anesthesia. Please make arrangements for a responsible adult friend or family member to stay with you for at least the first 24 hours after your procedure. 3. Do not have anything to eat or drink (including water, gum, mints, coffee, juice) after 11:59 PM 12/4. This may not apply to medications prescribed by your physician. (Please note below the special instructions with medications to take the morning of your procedure.)    4. If applicable, follow the clear liquid diet and bowel prep instructions provided by your physician's office. If you do not have this information, or have any questions, please contact your physician's office. 5. We recommend you do not drink any alcoholic beverages for 24 hours before and after your procedure. 6. Contact your surgeons office for instructions on the following medications: non-steroidal anti-inflammatory drugs (i.e. Advil, Aleve), vitamins, and supplements. (Some surgeons will want you to stop these medications prior to surgery and others may allow you to take them)   **If you are currently taking Plavix, Coumadin, Aspirin and/or other blood-thinning agents, contact your surgeon for instructions. ** Your surgeon will partner with the physician prescribing these medications to determine if it is safe to stop or if you need to continue taking.  Please do not stop taking these medications without instructions from your surgeon. 7. In an effort to help prevent surgical site infection, we ask that you shower with an anti-bacterial soap (i.e. Dial or Safeguard) on the morning of your procedure. Do not apply any lotions, powders, or deodorants after showering. 8. Wear comfortable clothes. Wear glasses instead of contacts. Do not bring any jewelry or money (other than copays or fees as instructed). Do not wear make-up, particularly mascara, the morning of your procedure. Wear your hair loose or down, no ponytails, buns, tanner pins or clips. All body piercings must be removed. 9. You should understand that if you do not follow these instructions your procedure may be cancelled. If your physical condition changes (i.e. fever, cold or flu) please contact your surgeon as soon as possible. 10. It is important that you be on time. If a situation occurs where you may be late, or if you have any questions or problems, please call [phone]. 11. Your procedure time may be subject to change. You will receive a phone call the day prior to confirm your arrival time. Special Instructions: Take all medications and inhalers, as prescribed, on the morning of surgery with a sip of water EXCEPT: n/a      Insulin Dependent Diabetic patients: Take your diabetic medications as prescribed the day before surgery. Hold all diabetic medications the day of surgery. If you are scheduled to arrive for surgery after 8:00 AM, and your AM blood sugar is >200, please call Ambulatory Surgery. I understand a pre-operative phone call will be made to verify my procedure time. In the event that I am not available, I give permission for a message to be left on my answering service and/or with another person?       yes         ___________________      ___________________      ___________________  (Signature of Patient)          (Witness)                   (Date and Time)

## 2019-12-04 ENCOUNTER — HOSPITAL ENCOUNTER (OUTPATIENT)
Dept: MAMMOGRAPHY | Age: 74
Discharge: HOME | End: 2019-12-04
Attending: INTERNAL MEDICINE
Payer: MEDICARE

## 2019-12-04 ENCOUNTER — ANESTHESIA EVENT (OUTPATIENT)
Dept: SURGERY | Age: 74
End: 2019-12-04
Payer: MEDICARE

## 2019-12-04 DIAGNOSIS — Z12.39 BREAST SCREENING: ICD-10-CM

## 2019-12-04 PROCEDURE — 77067 SCR MAMMO BI INCL CAD: CPT

## 2019-12-05 ENCOUNTER — HOSPITAL ENCOUNTER (OUTPATIENT)
Age: 74
Setting detail: OUTPATIENT SURGERY
Discharge: HOME | End: 2019-12-05
Attending: SPECIALIST | Admitting: SPECIALIST
Payer: MEDICARE

## 2019-12-05 ENCOUNTER — ANESTHESIA (OUTPATIENT)
Dept: SURGERY | Age: 74
End: 2019-12-05
Payer: MEDICARE

## 2019-12-05 VITALS
HEART RATE: 82 BPM | WEIGHT: 140 LBS | BODY MASS INDEX: 22.5 KG/M2 | DIASTOLIC BLOOD PRESSURE: 64 MMHG | OXYGEN SATURATION: 100 % | RESPIRATION RATE: 14 BRPM | SYSTOLIC BLOOD PRESSURE: 110 MMHG | HEIGHT: 66 IN | TEMPERATURE: 98 F

## 2019-12-05 PROCEDURE — 77030013992 HC SNR POLYP ENDOSC BSC -B: Performed by: SPECIALIST

## 2019-12-05 PROCEDURE — 77030021352 HC CBL LD SYS DISP COVD -B: Performed by: SPECIALIST

## 2019-12-05 PROCEDURE — 88305 TISSUE EXAM BY PATHOLOGIST: CPT

## 2019-12-05 PROCEDURE — 74011250636 HC RX REV CODE- 250/636: Performed by: ANESTHESIOLOGY

## 2019-12-05 PROCEDURE — 76210000046 HC AMBSU PH II REC FIRST 0.5 HR: Performed by: SPECIALIST

## 2019-12-05 PROCEDURE — 76060000061 HC AMB SURG ANES 0.5 TO 1 HR: Performed by: SPECIALIST

## 2019-12-05 PROCEDURE — 77030020255 HC SOL INJ LR 1000ML BG: Performed by: SPECIALIST

## 2019-12-05 PROCEDURE — 76210000040 HC AMBSU PH I REC FIRST 0.5 HR: Performed by: SPECIALIST

## 2019-12-05 PROCEDURE — 74011250636 HC RX REV CODE- 250/636: Performed by: NURSE ANESTHETIST, CERTIFIED REGISTERED

## 2019-12-05 PROCEDURE — 76030000000 HC AMB SURG OR TIME 0.5 TO 1: Performed by: SPECIALIST

## 2019-12-05 RX ORDER — PROPOFOL 10 MG/ML
INJECTION, EMULSION INTRAVENOUS AS NEEDED
Status: DISCONTINUED | OUTPATIENT
Start: 2019-12-05 | End: 2019-12-05 | Stop reason: HOSPADM

## 2019-12-05 RX ORDER — SODIUM CHLORIDE 0.9 % (FLUSH) 0.9 %
5-40 SYRINGE (ML) INJECTION AS NEEDED
Status: DISCONTINUED | OUTPATIENT
Start: 2019-12-05 | End: 2019-12-05 | Stop reason: HOSPADM

## 2019-12-05 RX ORDER — ONDANSETRON 2 MG/ML
4 INJECTION INTRAMUSCULAR; INTRAVENOUS AS NEEDED
Status: DISCONTINUED | OUTPATIENT
Start: 2019-12-05 | End: 2019-12-05 | Stop reason: HOSPADM

## 2019-12-05 RX ORDER — SODIUM CHLORIDE, SODIUM LACTATE, POTASSIUM CHLORIDE, CALCIUM CHLORIDE 600; 310; 30; 20 MG/100ML; MG/100ML; MG/100ML; MG/100ML
25 INJECTION, SOLUTION INTRAVENOUS CONTINUOUS
Status: DISCONTINUED | OUTPATIENT
Start: 2019-12-05 | End: 2019-12-05 | Stop reason: HOSPADM

## 2019-12-05 RX ORDER — SODIUM CHLORIDE 9 MG/ML
50 INJECTION, SOLUTION INTRAVENOUS CONTINUOUS
Status: DISCONTINUED | OUTPATIENT
Start: 2019-12-05 | End: 2019-12-05 | Stop reason: HOSPADM

## 2019-12-05 RX ORDER — FENTANYL CITRATE 50 UG/ML
25 INJECTION, SOLUTION INTRAMUSCULAR; INTRAVENOUS
Status: DISCONTINUED | OUTPATIENT
Start: 2019-12-05 | End: 2019-12-05 | Stop reason: HOSPADM

## 2019-12-05 RX ORDER — DIPHENHYDRAMINE HYDROCHLORIDE 50 MG/ML
12.5 INJECTION, SOLUTION INTRAMUSCULAR; INTRAVENOUS AS NEEDED
Status: DISCONTINUED | OUTPATIENT
Start: 2019-12-05 | End: 2019-12-05 | Stop reason: HOSPADM

## 2019-12-05 RX ORDER — SODIUM CHLORIDE 0.9 % (FLUSH) 0.9 %
5-40 SYRINGE (ML) INJECTION EVERY 8 HOURS
Status: DISCONTINUED | OUTPATIENT
Start: 2019-12-05 | End: 2019-12-05 | Stop reason: HOSPADM

## 2019-12-05 RX ORDER — DEXTROMETHORPHAN/PSEUDOEPHED 2.5-7.5/.8
1.2 DROPS ORAL
Status: DISCONTINUED | OUTPATIENT
Start: 2019-12-05 | End: 2019-12-05 | Stop reason: HOSPADM

## 2019-12-05 RX ORDER — PHENYLEPHRINE HCL IN 0.9% NACL 0.4MG/10ML
SYRINGE (ML) INTRAVENOUS AS NEEDED
Status: DISCONTINUED | OUTPATIENT
Start: 2019-12-05 | End: 2019-12-05 | Stop reason: HOSPADM

## 2019-12-05 RX ORDER — LIDOCAINE HYDROCHLORIDE 10 MG/ML
0.1 INJECTION, SOLUTION EPIDURAL; INFILTRATION; INTRACAUDAL; PERINEURAL AS NEEDED
Status: DISCONTINUED | OUTPATIENT
Start: 2019-12-05 | End: 2019-12-05 | Stop reason: HOSPADM

## 2019-12-05 RX ADMIN — Medication 80 MCG: at 09:08

## 2019-12-05 RX ADMIN — PROPOFOL 20 MG: 10 INJECTION, EMULSION INTRAVENOUS at 08:56

## 2019-12-05 RX ADMIN — Medication 40 MCG: at 09:00

## 2019-12-05 RX ADMIN — PROPOFOL 50 MG: 10 INJECTION, EMULSION INTRAVENOUS at 08:45

## 2019-12-05 RX ADMIN — PROPOFOL 10 MG: 10 INJECTION, EMULSION INTRAVENOUS at 08:58

## 2019-12-05 RX ADMIN — PROPOFOL 10 MG: 10 INJECTION, EMULSION INTRAVENOUS at 09:04

## 2019-12-05 RX ADMIN — PROPOFOL 10 MG: 10 INJECTION, EMULSION INTRAVENOUS at 09:01

## 2019-12-05 RX ADMIN — PROPOFOL 10 MG: 10 INJECTION, EMULSION INTRAVENOUS at 08:48

## 2019-12-05 RX ADMIN — PROPOFOL 10 MG: 10 INJECTION, EMULSION INTRAVENOUS at 08:50

## 2019-12-05 RX ADMIN — SODIUM CHLORIDE, SODIUM LACTATE, POTASSIUM CHLORIDE, AND CALCIUM CHLORIDE 25 ML/HR: 600; 310; 30; 20 INJECTION, SOLUTION INTRAVENOUS at 07:57

## 2019-12-05 RX ADMIN — PROPOFOL 10 MG: 10 INJECTION, EMULSION INTRAVENOUS at 08:52

## 2019-12-05 RX ADMIN — PROPOFOL 10 MG: 10 INJECTION, EMULSION INTRAVENOUS at 08:54

## 2019-12-05 RX ADMIN — PROPOFOL 20 MG: 10 INJECTION, EMULSION INTRAVENOUS at 08:53

## 2019-12-05 RX ADMIN — PROPOFOL 20 MG: 10 INJECTION, EMULSION INTRAVENOUS at 08:51

## 2019-12-05 RX ADMIN — Medication 40 MCG: at 09:04

## 2019-12-05 RX ADMIN — SODIUM CHLORIDE, SODIUM LACTATE, POTASSIUM CHLORIDE, AND CALCIUM CHLORIDE: 600; 310; 30; 20 INJECTION, SOLUTION INTRAVENOUS at 08:28

## 2019-12-05 RX ADMIN — PROPOFOL 20 MG: 10 INJECTION, EMULSION INTRAVENOUS at 08:47

## 2019-12-05 RX ADMIN — PROPOFOL 10 MG: 10 INJECTION, EMULSION INTRAVENOUS at 09:08

## 2019-12-05 RX ADMIN — PROPOFOL 20 MG: 10 INJECTION, EMULSION INTRAVENOUS at 08:49

## 2019-12-05 NOTE — PROCEDURES
Colonoscopy Procedure Note    Indications:   Personal history of colon polyps (screening only)    Referring Physician: Vern Holter, MD  Anesthesia/Sedation: MAC anesthesia Propofol  Endoscopist:  Dr. Mark Ellison    Procedure in Detail:  Informed consent was obtained for the procedure, including sedation. Risks of perforation, hemorrhage, adverse drug reaction, and aspiration were discussed. The patient was placed in the left lateral decubitus position. Based on the pre-procedure assessment, including review of the patient's medical history, medications, allergies, and review of systems, she had been deemed to be an appropriate candidate for moderate sedation; she was therefore sedated with the medications listed above. The patient was monitored continuously with ECG tracing, pulse oximetry, blood pressure monitoring, and direct observations. A rectal examination was performed. The [id number] was inserted into the rectum and advanced under direct vision to the cecum, which was identified by the ileocecal valve and appendiceal orifice. The quality of the colonic preparation was adequate. A careful inspection was made as the colonoscope was withdrawn, including a retroflexed view of the rectum; findings and interventions are described below. Appropriate photodocumentation was obtained. Findings:     1. Scope advanced to the cecum. 2.  Severe segment of diverticulosis in the sigmoid with muscular fold hypertrophy and retained stool balls. 3.  Sessile 5 mm polyp in the transverse colon s/p cold snare removal.  4.  Small internal hemorrhoids. Therapies:  See above    Specimen: Specimens were collected as described above and sent to pathology. Complications: None were encountered during the procedure. EBL: < 10 ml. Recommendations:     - Repeat colonoscopy in 5 years.     Signed By: Ankita Arredondo MD                        December 5, 2019

## 2019-12-05 NOTE — PERIOP NOTES
Permission received to review discharge instructions and discuss private health information with , Michele Zapata.     Patient states that  will be with them for at least 24 hours following today's procedure.

## 2019-12-05 NOTE — H&P
Gastroenterology Outpatient History and Physical    Patient: Daisy Caruso    Physician: Yany Patel MD    Vital Signs: Blood pressure 126/73, pulse 83, temperature 98.6 °F (37 °C), resp. rate 18, height 5' 6\" (1.676 m), weight 63.5 kg (140 lb), SpO2 100 %, not currently breastfeeding. Allergies: No Known Allergies    Chief Complaint: Screening colonoscopy; h/o polyps    History of Present Illness: 76 yo BF for screening colonoscopy. Justification for Procedure: above    History:  Past Medical History:   Diagnosis Date    Dyspepsia and other specified disorders of function of stomach     GERD (gastroesophageal reflux disease)     H/O allergy     Hypercholesterolemia     Hypertension     Indigestion     Other ill-defined conditions(799.89)     elevated cholesterol      Past Surgical History:   Procedure Laterality Date    ABDOMEN SURGERY PROC UNLISTED  14    LAPAROSCOPIC CHOLECYSTECTOMY with GRAMS    HX  SECTION      HX CHOLECYSTECTOMY  14    lap mando with cholangiogram    HX HYSTERECTOMY      HX OTHER SURGICAL  14    ERCPwith biliary sphincterotomy CBD balloon sweeps      HX TUBAL LIGATION        Social History     Socioeconomic History    Marital status:      Spouse name: Not on file    Number of children: Not on file    Years of education: Not on file    Highest education level: Not on file   Tobacco Use    Smoking status: Never Smoker    Smokeless tobacco: Never Used   Substance and Sexual Activity    Alcohol use: No    Drug use: No    Sexual activity: Yes     Partners: Male     Birth control/protection: Surgical      Family History   Problem Relation Age of Onset    Hypertension Mother     Cancer Brother         prostate       Medications:   Prior to Admission medications    Medication Sig Start Date End Date Taking? Authorizing Provider   amitriptyline (ELAVIL) 10 mg tablet Take 1 Tab by mouth nightly.  5/15/19  Yes Erin Hernadez MD felodipine (PLENDIL SR) 5 mg 24 hr tablet Take 1 Tab by mouth daily. 5/15/19  Yes Brennen Morgan MD   atorvastatin (LIPITOR) 20 mg tablet Take 1 Tab by mouth daily. 5/15/19  Yes Brennen Morgan MD   triamterene-hydroCHLOROthiazide (DYAZIDE) 37.5-25 mg per capsule Take 1 Cap by mouth daily. 5/15/19  Yes Brennen Morgan MD   Ketoconazole 2 % topical foam Apply  to affected area two (2) times a day. 5/1/18  Yes Brennen Morgan MD   dorzolamide-timolol (COSOPT) 22.3-6.8 mg/mL ophthalmic solution  10/22/15  Yes Provider, Historical   Cholecalciferol, Vitamin D3, (VITAMIN D3) 1,000 unit cap Take  by mouth. Yes Provider, Historical   fexofenadine (ALLEGRA) 180 mg tablet Take 180 mg by mouth daily. Yes Provider, Historical   aspirin 81 mg tablet Take 81 mg by mouth daily. Yes Provider, Historical       Physical Exam:   General: alert, no distress   HEENT: Head: Normocephalic, no lesions, without obvious abnormality.    Heart: regular rate and rhythm, S1, S2 normal, no murmur, click, rub or gallop   Lungs: chest clear, no wheezing, rales, normal symmetric air entry   Abdominal: soft, NT/ND + BS   Neurological: Grossly normal   Extremities: extremities normal, atraumatic, no cyanosis or edema     Findings/Diagnosis: Screening colonoscopy; H/O Colon polyps    Plan of Care/Planned Procedure: Colonoscopy

## 2019-12-05 NOTE — ANESTHESIA POSTPROCEDURE EVALUATION
Procedure(s):  COLONOSCOPY  ENDOSCOPIC POLYPECTOMY. total IV anesthesia    Anesthesia Post Evaluation        Patient location during evaluation: PACU  Note status: Adequate. Level of consciousness: responsive to verbal stimuli and sleepy but conscious  Pain management: satisfactory to patient  Airway patency: patent  Anesthetic complications: no  Cardiovascular status: acceptable  Respiratory status: acceptable  Hydration status: acceptable  Comments: +Post-Anesthesia Evaluation and Assessment    Patient: Carmelina Jorgensen MRN: [id number]  SSN: [id number]   YOB: 1945  Age: 75 y.o. Sex: female      Cardiovascular Function/Vital Signs    /64   Pulse 82   Temp 36.7 °C (98 °F)   Resp 14   Ht 5' 6\" (1.676 m)   Wt 63.5 kg (140 lb)   SpO2 100%   Breastfeeding No   BMI 22.60 kg/m²     Patient is status post Procedure(s):  COLONOSCOPY  ENDOSCOPIC POLYPECTOMY. Nausea/Vomiting: Controlled. Postoperative hydration reviewed and adequate. Pain:  Pain Scale 1: Numeric (0 - 10) (12/05/19 0931)  Pain Intensity 1: 0 (12/05/19 0931)   Managed. Neurological Status:   Neuro (WDL): Within Defined Limits (12/05/19 0931)   At baseline. Mental Status and Level of Consciousness: Arousable. Pulmonary Status:   O2 Device: Room air (12/05/19 0916)   Adequate oxygenation and airway patent. Complications related to anesthesia: None    Post-anesthesia assessment completed. No concerns.     Signed By: Russell Lima MD    12/5/2019  Post anesthesia nausea and vomiting:  controlled      Vitals Value Taken Time   /64 12/5/2019  9:31 AM   Temp 36.7 °C (98 °F) 12/5/2019  9:31 AM   Pulse 82 12/5/2019  9:31 AM   Resp 14 12/5/2019  9:31 AM   SpO2 100 % 12/5/2019  9:31 AM

## 2019-12-05 NOTE — PERIOP NOTES
Angy Eagle  1945  [id number]    Situation:  Verbal report given from: [address] [facility] CRNA  Procedure: Procedure(s):  COLONOSCOPY    Background:    Preoperative diagnosis: PERSONAL HISTORY COLON POLYPS    Postoperative diagnosis: Diverticulosis    :  Dr. Joya Salgado    Assistant(s): Circ-1: Escobar Pacheco RN  Circ-2: Roula Raymond RN  Scrub Tech-1: Lidia PINEDA    Specimens:   ID Type Source Tests Collected by Time Destination   1 : Transverse colon polyp Preservative Colon, Transverse  Dominga Goeva MD 12/5/2019 3424 Pathology       Assessment:  Intra-procedure medications   Propofol  mg      Anesthesia gave intra-procedure sedation and medications, see anesthesia flow sheet     Intravenous fluids: LR@ KVO     Vital signs stable     Abdominal assessment: round and soft       Recommendation:    Permission to share finding with  yes    All side rails up, bed in low position, wheels locked. Nurse at bedside.

## 2019-12-05 NOTE — ANESTHESIA PREPROCEDURE EVALUATION
Anesthetic History   No history of anesthetic complications            Review of Systems / Medical History  Patient summary reviewed, nursing notes reviewed and pertinent labs reviewed    Pulmonary  Within defined limits                 Neuro/Psych   Within defined limits           Cardiovascular    Hypertension            Pertinent negatives: No CAD  Exercise tolerance: >4 METS  Comments: elevated cholesterol  Hyperlipidemia   GI/Hepatic/Renal     GERD          Comments: Dyspepsia and other specified disorders of function of stomach  Endo/Other  Within defined limits           Other Findings              Physical Exam    Airway  Mallampati: II    Neck ROM: normal range of motion   Mouth opening: Diminished (comment)     Cardiovascular  Regular rate and rhythm,  S1 and S2 normal,  no murmur, click, rub, or gallop             Dental  No notable dental hx       Pulmonary  Breath sounds clear to auscultation               Abdominal  GI exam deferred       Other Findings            Anesthetic Plan    ASA: 2  Anesthesia type: total IV anesthesia          Induction: Intravenous  Anesthetic plan and risks discussed with: Patient

## 2020-01-26 RX ORDER — FELODIPINE 5 MG/1
TABLET, EXTENDED RELEASE ORAL
Qty: 90 TAB | Refills: 1 | Status: SHIPPED | OUTPATIENT
Start: 2020-01-26 | End: 2020-06-05

## 2020-01-26 RX ORDER — TRIAMTERENE AND HYDROCHLOROTHIAZIDE 37.5; 25 MG/1; MG/1
CAPSULE ORAL
Qty: 90 CAP | Refills: 1 | Status: SHIPPED | OUTPATIENT
Start: 2020-01-26 | End: 2020-06-05

## 2020-01-26 RX ORDER — ATORVASTATIN CALCIUM 20 MG/1
TABLET, FILM COATED ORAL
Qty: 90 TAB | Refills: 1 | Status: SHIPPED | OUTPATIENT
Start: 2020-01-26 | End: 2020-06-05

## 2020-02-11 RX ORDER — AMITRIPTYLINE HYDROCHLORIDE 10 MG/1
TABLET, FILM COATED ORAL
Qty: 30 TAB | Refills: 1 | Status: SHIPPED | OUTPATIENT
Start: 2020-02-11 | End: 2021-06-08 | Stop reason: ALTCHOICE

## 2020-05-07 ENCOUNTER — TELEPHONE (OUTPATIENT)
Dept: INTERNAL MEDICINE CLINIC | Age: 75
End: 2020-05-07

## 2020-05-30 DIAGNOSIS — E78.00 PURE HYPERCHOLESTEROLEMIA: ICD-10-CM

## 2020-05-30 DIAGNOSIS — I10 ESSENTIAL HYPERTENSION: Primary | ICD-10-CM

## 2020-06-03 NOTE — TELEPHONE ENCOUNTER
Called, spoke to pt. Two pt identifiers confirmed. Pt offered/accepted Virtual appt for 6/4/2020 at 1100. Informed pt that it will be billed under insurance and pt may expect a co-pay. Informed that pt must have access to a smartphone and/or a computer w/ a camera and a microphone. Informed pt that a PSR may contact pt roughly 15 minutes prior to [address]ver[address] 1237 for check-in. Pt verbalized understanding of information discussed w/ no further questions at this time.
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
## 2020-06-04 ENCOUNTER — VIRTUAL VISIT (OUTPATIENT)
Dept: INTERNAL MEDICINE CLINIC | Age: 75
End: 2020-06-04

## 2020-06-04 VITALS — SYSTOLIC BLOOD PRESSURE: 103 MMHG | DIASTOLIC BLOOD PRESSURE: 66 MMHG

## 2020-06-04 DIAGNOSIS — Z00.00 MEDICARE ANNUAL WELLNESS VISIT, SUBSEQUENT: ICD-10-CM

## 2020-06-04 DIAGNOSIS — I10 ESSENTIAL HYPERTENSION: Primary | ICD-10-CM

## 2020-06-04 DIAGNOSIS — F51.01 PRIMARY INSOMNIA: ICD-10-CM

## 2020-06-04 DIAGNOSIS — Z78.0 POSTMENOPAUSAL STATE: ICD-10-CM

## 2020-06-04 DIAGNOSIS — R73.01 IFG (IMPAIRED FASTING GLUCOSE): ICD-10-CM

## 2020-06-04 DIAGNOSIS — I25.10 CORONARY ARTERY DISEASE INVOLVING NATIVE CORONARY ARTERY OF NATIVE HEART WITHOUT ANGINA PECTORIS: ICD-10-CM

## 2020-06-04 DIAGNOSIS — E87.6 HYPOKALEMIA: ICD-10-CM

## 2020-06-04 DIAGNOSIS — E78.00 PURE HYPERCHOLESTEROLEMIA: ICD-10-CM

## 2020-06-04 NOTE — TELEPHONE ENCOUNTER
CP: Jovani Guevara MD    Last appt: 11/5/2019  Future Appointments   Date Time Provider Rabia Bullard   6/4/2020 11:00 AM Jovani Guevara MD [facility] 87       Requested Prescriptions     Pending Prescriptions Disp Refills    triamterene-hydroCHLOROthiazide (DYAZIDE) 37.5-25 mg per capsule [Pharmacy Med Name: TRIAMT/HCTZ  CAP 37.5-25] 90 Cap 1     Sig: TAKE 1 CAPSULE DAILY    atorvastatin (LIPITOR) 20 mg tablet [Pharmacy Med Name: ATORVASTATIN TAB 20MG] 90 Tab 1     Sig: TAKE 1 TABLET DAILY    felodipine (PLENDIL SR) 5 mg 24 hr tablet [Pharmacy Med Name: FELODIPINE TAB 5MG ER] 90 Tab 1     Sig: TAKE 1 TABLET DAILY

## 2020-06-04 NOTE — PATIENT INSTRUCTIONS
Medicare Wellness Visit, Female The best way to live healthy is to have a lifestyle where you eat a well-balanced diet, exercise regularly, limit alcohol use, and quit all forms of tobacco/nicotine, if applicable. Regular preventive services are another way to keep healthy. Preventive services (vaccines, screening tests, monitoring & exams) can help personalize your care plan, which helps you manage your own care. Screening tests can find health problems at the earliest stages, when they are easiest to treat. [facility] follows the current, evidence-based guidelines published by the [facility] Fabian Lozada ([facility]) when recommending preventive services for our patients. Because we follow these guidelines, sometimes recommendations change over time as research supports it. (For example, mammograms used to be recommended annually. Even though Medicare will still pay for an annual mammogram, the newer guidelines recommend a mammogram every two years for women of average risk). Of course, you and your doctor may decide to screen more often for some diseases, based on your risk and your co-morbidities (chronic disease you are already diagnosed with). Preventive services for you include: - Medicare offers their members a free annual wellness visit, which is time for you and your primary care provider to discuss and plan for your preventive service needs. Take advantage of this benefit every year! 
-All adults over the age of 72 should receive the recommended pneumonia vaccines. Current USPSTF guidelines recommend a series of two vaccines for the best pneumonia protection.  
-All adults should have a flu vaccine yearly and a tetanus vaccine every 10 years.  
-All adults age 48 and older should receive the shingles vaccines (series of two vaccines). -All adults age 38-68 who are overweight should have a diabetes screening test once every three years. -All adults born between [birth year] and 1965 should be screened once for Hepatitis C. 
-Other screening tests and preventive services for persons with diabetes include: an eye exam to screen for diabetic retinopathy, a kidney function test, a foot exam, and stricter control over your cholesterol.  
-Cardiovascular screening for adults with routine risk involves an electrocardiogram (ECG) at intervals determined by your doctor.  
-Colorectal cancer screenings should be done for adults age 54-65 with no increased risk factors for colorectal cancer. There are a number of acceptable methods of screening for this type of cancer. Each test has its own benefits and drawbacks. Discuss with your doctor what is most appropriate for you during your annual wellness visit. The different tests include: colonoscopy (considered the best screening method), a fecal occult blood test, a fecal DNA test, and sigmoidoscopy. 
 
-A bone mass density test is recommended when a woman turns 65 to screen for osteoporosis. This test is only recommended one time, as a screening. Some providers will use this same test as a disease monitoring tool if you already have osteoporosis. -Breast cancer screenings are recommended every other year for women of normal risk, age 54-69. 
-Cervical cancer screenings for women over age 72 are only recommended with certain risk factors. Here is a list of your current Health Maintenance items (your personalized list of preventive services) with a due date: 
Health Maintenance Due Topic Date Due  Glaucoma Screening   03/17/2018 [address] [place] [address] Annual Well Visit  05/07/2020 Medicare Wellness Visit, Female The best way to live healthy is to have a lifestyle where you eat a well-balanced diet, exercise regularly, limit alcohol use, and quit all forms of tobacco/nicotine, if applicable. Regular preventive services are another way to keep healthy.  Preventive services (vaccines, screening tests, monitoring & exams) can help personalize your care plan, which helps you manage your own care. Screening tests can find health problems at the earliest stages, when they are easiest to treat. [facility] follows the current, evidence-based guidelines published by the [facility] Fabian Lozada ([facility]) when recommending preventive services for our patients. Because we follow these guidelines, sometimes recommendations change over time as research supports it. (For example, mammograms used to be recommended annually. Even though Medicare will still pay for an annual mammogram, the newer guidelines recommend a mammogram every two years for women of average risk). Of course, you and your doctor may decide to screen more often for some diseases, based on your risk and your co-morbidities (chronic disease you are already diagnosed with). Preventive services for you include: - Medicare offers their members a free annual wellness visit, which is time for you and your primary care provider to discuss and plan for your preventive service needs. Take advantage of this benefit every year! 
-All adults over the age of 72 should receive the recommended pneumonia vaccines. Current USPSTF guidelines recommend a series of two vaccines for the best pneumonia protection.  
-All adults should have a flu vaccine yearly and a tetanus vaccine every 10 years.  
-All adults age 48 and older should receive the shingles vaccines (series of two vaccines). -All adults age 38-68 who are overweight should have a diabetes screening test once every three years.  
-All adults born between 80 and 1965 should be screened once for Hepatitis C. 
-Other screening tests and preventive services for persons with diabetes include: an eye exam to screen for diabetic retinopathy, a kidney function test, a foot exam, and stricter control over your cholesterol. -Cardiovascular screening for adults with routine risk involves an electrocardiogram (ECG) at intervals determined by your doctor.  
-Colorectal cancer screenings should be done for adults age 54-65 with no increased risk factors for colorectal cancer. There are a number of acceptable methods of screening for this type of cancer. Each test has its own benefits and drawbacks. Discuss with your doctor what is most appropriate for you during your annual wellness visit. The different tests include: colonoscopy (considered the best screening method), a fecal occult blood test, a fecal DNA test, and sigmoidoscopy. 
 
-A bone mass density test is recommended when a woman turns 65 to screen for osteoporosis. This test is only recommended one time, as a screening. Some providers will use this same test as a disease monitoring tool if you already have osteoporosis. -Breast cancer screenings are recommended every other year for women of normal risk, age 54-69. 
-Cervical cancer screenings for women over age 72 are only recommended with certain risk factors. Here is a list of your current Health Maintenance items (your personalized list of preventive services) with a due date: 
Health Maintenance Due Topic Date Due  Glaucoma Screening   03/17/2018 Whitley Nagel Annual Well Visit  05/07/2020

## 2020-06-05 RX ORDER — ATORVASTATIN CALCIUM 20 MG/1
TABLET, FILM COATED ORAL
Qty: 90 TAB | Refills: 1 | Status: SHIPPED | OUTPATIENT
Start: 2020-06-05 | End: 2020-11-19 | Stop reason: SDUPTHER

## 2020-06-05 RX ORDER — FELODIPINE 5 MG/1
TABLET, EXTENDED RELEASE ORAL
Qty: 90 TAB | Refills: 1 | Status: SHIPPED | OUTPATIENT
Start: 2020-06-05 | End: 2020-11-19 | Stop reason: SDUPTHER

## 2020-06-05 RX ORDER — TRIAMTERENE AND HYDROCHLOROTHIAZIDE 37.5; 25 MG/1; MG/1
CAPSULE ORAL
Qty: 90 CAP | Refills: 1 | Status: SHIPPED | OUTPATIENT
Start: 2020-06-05 | End: 2020-11-19 | Stop reason: SDUPTHER

## 2020-06-19 LAB
ALBUMIN SERPL-MCNC: 4.3 G/DL (ref 3.7–4.7)
ALBUMIN/GLOB SERPL: 1.4 {RATIO} (ref 1.2–2.2)
ALP SERPL-CCNC: 75 IU/L (ref 39–117)
ALT SERPL-CCNC: 22 IU/L (ref 0–32)
AST SERPL-CCNC: 17 IU/L (ref 0–40)
BILIRUB SERPL-MCNC: 0.5 MG/DL (ref 0–1.2)
BUN SERPL-MCNC: 13 MG/DL (ref 8–27)
BUN/CREAT SERPL: 12 (ref 12–28)
CALCIUM SERPL-MCNC: 9.7 MG/DL (ref 8.7–10.3)
CHLORIDE SERPL-SCNC: 103 MMOL/L (ref 96–106)
CHOLEST SERPL-MCNC: 143 MG/DL (ref 100–199)
CO2 SERPL-SCNC: 25 MMOL/L (ref 20–29)
CREAT SERPL-MCNC: 1.06 MG/DL (ref 0.57–1)
EST. AVERAGE GLUCOSE BLD GHB EST-MCNC: 120 MG/DL
GLOBULIN SER CALC-MCNC: 3 G/DL (ref 1.5–4.5)
GLUCOSE SERPL-MCNC: 99 MG/DL (ref 65–99)
HBA1C MFR BLD: 5.8 % (ref 4.8–5.6)
HDLC SERPL-MCNC: 47 MG/DL
LDLC SERPL CALC-MCNC: 74 MG/DL (ref 0–99)
POTASSIUM SERPL-SCNC: 3.6 MMOL/L (ref 3.5–5.2)
PROT SERPL-MCNC: 7.3 G/DL (ref 6–8.5)
SODIUM SERPL-SCNC: 141 MMOL/L (ref 134–144)
TRIGL SERPL-MCNC: 109 MG/DL (ref 0–149)
TSH SERPL DL<=0.005 MIU/L-ACNC: 1.34 UIU/ML (ref 0.45–4.5)
VLDLC SERPL CALC-MCNC: 22 MG/DL (ref 5–40)

## 2020-07-07 ENCOUNTER — HOSPITAL ENCOUNTER (OUTPATIENT)
Dept: MAMMOGRAPHY | Age: 75
Discharge: HOME | End: 2020-07-07
Attending: INTERNAL MEDICINE
Payer: MEDICARE

## 2020-07-07 DIAGNOSIS — Z78.0 POSTMENOPAUSAL STATE: ICD-10-CM

## 2020-07-07 PROCEDURE — 77080 DXA BONE DENSITY AXIAL: CPT

## 2020-09-29 NOTE — LETTER
"Chief Complaint   Patient presents with     Back Pain     few days ago got worse, middle lower back, lifting a bucket of water     Health Maintenance     order pended, fall risk, wellness visit       Initial BP (!) 142/72   Pulse 67   Temp 97.7  F (36.5  C) (Tympanic)   Wt 100.2 kg (221 lb)   SpO2 98%   BMI 42.45 kg/m   Estimated body mass index is 42.45 kg/m  as calculated from the following:    Height as of 2/5/20: 1.537 m (5' 0.5\").    Weight as of this encounter: 100.2 kg (221 lb).  Medication Reconciliation: complete  Lesa Michael, PHILLY    " 11/7/2018 6:56 AM 
 
Ms. Abdi Dsouza [address] [facility] [id number] Dear Abdi Dsouza: 
 
Please find your most recent results below. Resulted Orders TSH 3RD GENERATION Result Value Ref Range TSH 2.400 0.450 - 4.500 uIU/mL Narrative Performed at:  [address]  [id number] : Leo Brooks MD, Phone:  [phone] HEMOGLOBIN A1C WITH EAG Result Value Ref Range Hemoglobin A1c 5.9 (H) 4.8 - 5.6 % Comment:  
            Prediabetes: 5.7 - 6.4 Diabetes: >6.4 Glycemic control for adults with diabetes: <7.0 Estimated average glucose 123 mg/dL Narrative Performed at:  [address]  [id number] : Leo Brooks MD, Phone:  [phone] CBC W/O DIFF Result Value Ref Range WBC 6.6 3.4 - 10.8 x10E3/uL  
 RBC 5.11 3.77 - 5.28 x10E6/uL HGB 11.6 11.1 - 15.9 g/dL HCT 37.3 34.0 - 46.6 % MCV 73 (L) 79 - 97 fL  
 MCH 22.7 (L) 26.6 - 33.0 pg  
 MCHC 31.1 (L) 31.5 - 35.7 g/dL  
 RDW 17.0 (H) 12.3 - 15.4 % PLATELET 610 803 - 500 x10E3/uL Narrative Performed at:  [address]  [id number] : Leo Brooks MD, Phone:  [phone] METABOLIC PANEL, COMPREHENSIVE Result Value Ref Range Glucose 88 65 - 99 mg/dL BUN 15 8 - 27 mg/dL Creatinine 1.06 (H) 0.57 - 1.00 mg/dL GFR est non-AA 52 (L) >59 mL/min/1.73 GFR est AA 60 >59 mL/min/1.73  
 BUN/Creatinine ratio 14 12 - 28 Sodium 138 134 - 144 mmol/L Potassium 3.8 3.5 - 5.2 mmol/L Chloride 99 96 - 106 mmol/L  
 CO2 25 20 - 29 mmol/L Calcium 9.3 8.7 - 10.3 mg/dL Protein, total 7.5 6.0 - 8.5 g/dL Albumin 4.4 3.5 - 4.8 g/dL GLOBULIN, TOTAL 3.1 1.5 - 4.5 g/dL A-G Ratio 1.4 1.2 - 2.2 Bilirubin, total 0.4 0.0 - 1.2 mg/dL Alk.  phosphatase 78 39 - 117 IU/L  
 AST (SGOT) 22 0 - 40 IU/L  
 ALT (SGPT) 25 0 - 32 IU/L Narrative Performed at:  [address]  [id number] : Monika Mukherjee MD, Phone:  [phone] LIPID PANEL Result Value Ref Range Cholesterol, total 133 100 - 199 mg/dL Triglyceride 77 0 - 149 mg/dL HDL Cholesterol 42 >39 mg/dL VLDL, calculated 15 5 - 40 mg/dL LDL, calculated 76 0 - 99 mg/dL Narrative Performed at:  [address]  [id number] : Monika Mukherjee MD, Phone:  [phone] RECOMMENDATIONS: 
None. Keep up the good work! Please call me if you have any questions: [phone] Sincerely, 
 
 
Eliane Sanz MD

## 2020-11-04 ENCOUNTER — TELEPHONE (OUTPATIENT)
Dept: INTERNAL MEDICINE CLINIC | Age: 75
End: 2020-11-04

## 2020-11-04 NOTE — TELEPHONE ENCOUNTER
----- Message from [place] sent at 11/4/2020 10:59 AM EST -----  Regarding: Dr. Quang Bo  Appointment not available    Caller's first and last name and relationship to patient (if not the patient): n/a      Best contact number:[phone]      Preferred date and time: first available in office appt      Scheduled appointment date and time: n/a      Reason for appointment: Follow Up - pt says she is having problems that she needs to be seen for. Details to clarify the request: Pt declined to do a virtual visit.        Message from Erica

## 2020-11-04 NOTE — TELEPHONE ENCOUNTER
----- Message from [place] sent at 11/4/2020 11:03 AM EST -----  Regarding: Dr. Kyler Flood (if not patient): n/a      Relationship of caller (if not patient): n/a      Best contact number(s):[phone]      Name of medication and dosage if known: Triamterene Hydrochlorothiazide 37.5-25 mg, Atorvastatin Calcium tabs 20 mg, & Felodipine 5 mg    Is patient out of this medication (yes/no):  no      Pharmacy name: [address] [place] [address] Blvd listed in chart? (yes/no): yes  Pharmacy phone number: on file      Message from Darny Pitts

## 2020-11-05 NOTE — TELEPHONE ENCOUNTER
Called out and spoke to pt. Two pt identifiers confirmed. Pt c/o a lump on neck which is discomforting. Pt offered and accepted in-office appt for 11/10/20 at 1115. Pt verbalized understanding of information discussed w/ no further questions at this time.

## 2020-11-06 NOTE — PROGRESS NOTES
HISTORY OF PRESENT ILLNESS  Andrés Denney is a 75 y.o. female. HPI     Last here 6/4/20 Pt is here for acute care. She c/o lump on neck x several years she is really not quite clear on the timeline she thinks it has been there for 6 months to a year went through old notes to figure out timeline evaluated this back in 2018  She has had this in the past but now it is bothering her x couple mos   Not bigger, just painful to touch   On exam: 1 cm lump mildly tender to palpation  Will get repeat ct  Will give referral to ENT     Reviewed old notes: In 2018, concern for enlarged lymphnode, ct neck: consistent with lipoma      BP is 117/77  No home readings     Continues on dyazide 37.5-25mg and felodipine 5mg daily     Wt today is 140lb - stable x lov  Her weight is within normal ranges     Reviewed labs   Ordered labs     She saw Dr. Narda Givens (GI) for constipation   She was given colace   She c/o straining and pain in lower abd   Discussed miralax prn   She is eating better and is now having 1 BM a day   Reviewed ct abd 2018, no hernia      Pt is taking ASA 81mg daily  Continues on lipitor 20mg daily for cholesterol     No longer using protonix  She is not having any issues with heartburn 11/20      Pt failed trazadone    Gave elavil 10mg prn for sleep     Not having a lot of allergy issues      She saw Dr. 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  SDM is her  Marcelo Dinh).        Recall saw cardiologist in past for CP. No blockage was found.  No recurrent CP.      PREVENTIVE:    Colonoscopy: 12/19DrElo Givens, repeat 5 years,  severe diverticulosis  AAA: CT abd 10/18, negative  Pap: Dr. Joey Summers, 7/15,  every other year, hysterectomy and BSO in 1994, due reminded  Mammogram: 12/19 neg  DEXA: 7/720, osteopenia Tdap: 2015  Pneumovax: 2014  Tznfumd35: 2016  Zostavax: 2010    Shingrix: 1st round completed , 2nd round backordered--reminded  Flu shot: 10/20  A1c:  ,  6.2, 10/17 5.7,  5.7,  5.9  5.9   6.0  5.8  Eye exam: Dr. Jaz Gutiérrez PA, --goes q 4 months d/t possible glaucoma, scheduled 20  Lipids:  ldl 74    Patient Active Problem List    Diagnosis Date Noted    IFG (impaired fasting glucose) 2019    Angioedema 2016    Hyperlipidemia 2013    CAD (coronary artery disease) 10/01/2012    H/O allergy     Hypertension      Current Outpatient Medications   Medication Sig Dispense Refill    triamterene-hydroCHLOROthiazide (DYAZIDE) 37.5-25 mg per capsule TAKE 1 CAPSULE DAILY 90 Cap 1    atorvastatin (LIPITOR) 20 mg tablet TAKE 1 TABLET DAILY 90 Tab 1    felodipine (PLENDIL SR) 5 mg 24 hr tablet TAKE 1 TABLET DAILY 90 Tab 1    amitriptyline (ELAVIL) 10 mg tablet TAKE 1 TABLET BY MOUTH EVERY DAY AT NIGHT 30 Tab 1    Cholecalciferol, Vitamin D3, (VITAMIN D3) 1,000 unit cap Take  by mouth.  fexofenadine (ALLEGRA) 180 mg tablet Take 180 mg by mouth daily.  aspirin 81 mg tablet Take 81 mg by mouth daily.  Ketoconazole 2 % topical foam Apply  to affected area two (2) times a day.  100 g 0    dorzolamide-timolol (COSOPT) 22.3-6.8 mg/mL ophthalmic solution        Past Surgical History:   Procedure Laterality Date    ABDOMEN SURGERY PROC UNLISTED  14    LAPAROSCOPIC CHOLECYSTECTOMY with GRAMS    COLONOSCOPY N/A 2019    COLONOSCOPY performed by Alton Benitez [facility] at [facility] AM[facility]ULATORY OR    HX  SECTION      HX CHOLECYSTECTOMY  14    lap mando with cholangiogram    HX HYSTERECTOMY      HX OTHER SURGICAL  14    ERCPwith biliary sphincterotomy CBD balloon sweeps      HX TUBAL LIGATION        Lab Results   Component Value Date/Time    WBC 7.3 2019 08:37 AM    HGB 11.8 2019 08:37 AM    HCT 39.5 11/05/2019 08:37 AM    PLATELET 473 55/82/5075 08:37 AM    MCV 76 (L) 11/05/2019 08:37 AM     Lab Results   Component Value Date/Time    Cholesterol, total 143 06/18/2020 09:00 AM    HDL Cholesterol 47 06/18/2020 09:00 AM    LDL, calculated 74 06/18/2020 09:00 AM    Triglyceride 109 06/18/2020 09:00 AM     Lab Results   Component Value Date/Time    GFR est non-AA 51 (L) 06/18/2020 09:00 AM    GFR est AA 59 (L) 06/18/2020 09:00 AM    Creatinine 1.06 (H) 06/18/2020 09:00 AM    BUN 13 06/18/2020 09:00 AM    Sodium 141 06/18/2020 09:00 AM    Potassium 3.6 06/18/2020 09:00 AM    Chloride 103 06/18/2020 09:00 AM    CO2 25 06/18/2020 09:00 AM        Review of Systems   Respiratory: Negative for shortness of breath. Cardiovascular: Negative for chest pain. Physical Exam  Constitutional:       General: She is not in acute distress. Appearance: Normal appearance. She is not ill-appearing, toxic-appearing or diaphoretic. HENT:      Head: Normocephalic and atraumatic. Right Ear: External ear normal.      Left Ear: External ear normal.   Eyes:      General:         Right eye: No discharge. Left eye: No discharge. Conjunctiva/sclera: Conjunctivae normal.      Pupils: Pupils are equal, round, and reactive to light. Neck:      Musculoskeletal: Normal range of motion and neck supple. Comments: 1 cm lymphnode R neck  Cardiovascular:      Rate and Rhythm: Normal rate and regular rhythm. Pulses: Normal pulses. Heart sounds: Normal heart sounds. No murmur. No friction rub. No gallop. Pulmonary:      Effort: No respiratory distress. Breath sounds: Normal breath sounds. No wheezing or rales. Chest:      Chest wall: No tenderness. Musculoskeletal: Normal range of motion. Skin:     General: Skin is warm and dry. Neurological:      Mental Status: She is alert and oriented to person, place, and time. Mental status is at baseline.       Coordination: Coordination normal.      Gait: Gait normal.   Psychiatric:         Mood and Affect: Mood normal.         Behavior: Behavior normal.         ASSESSMENT and PLAN    ICD-10-CM ICD-9-CM    1. Essential hypertension     Controlled on Dyazide and felodipine continue no change in dose     I10 401.9    2. IFG (impaired fasting glucose)    Diet controlled check A1c treat further as needed     R73.01 790.21    3. Pure hypercholesterolemia    On Lipitor 20 mg daily monitor lipids     E78.00 272.0    4. Constipation, unspecified constipation type       Patient complains of straining recently    Has resulted in some lower quadrant pain which is improving    Discussed MiraLAX as needed if symptoms persist will evaluate further    Of note had recent colonoscopy K59.00 564.00    5. Mass of right side of neck       Repeat CT of the neck --had imaging in the past which showed a benign lipoma     we will send to ENT to have this removed at this time as now more bothersome R22.1 784.2 CT NECK SOFT TISSUE W CONT      REFERRAL TO ENT-OTOLARYNGOLOGY        6 insomnia improved on Elavil  Depression screen reviewed and negative      Scribed by Isidro Garica of [address] [place] [address] [address], as dictated by Dr. Arelis Cabrera. Current diagnosis and concerns discussed with pt at length. Pt understands risks and benefits or current treatment plan and medications, and accepts the treatment and medication with any possible risks. Pt asks appropriate questions, which were answered. Pt was instructed to call with any concerns or problems. I have reviewed the note documented by the scribe. The services provided are my own.   The documentation is accurate

## 2020-11-10 ENCOUNTER — OFFICE VISIT (OUTPATIENT)
Dept: INTERNAL MEDICINE CLINIC | Age: 75
End: 2020-11-10
Payer: MEDICARE

## 2020-11-10 VITALS
OXYGEN SATURATION: 99 % | TEMPERATURE: 98.5 F | DIASTOLIC BLOOD PRESSURE: 77 MMHG | RESPIRATION RATE: 18 BRPM | BODY MASS INDEX: 22.6 KG/M2 | HEART RATE: 80 BPM | SYSTOLIC BLOOD PRESSURE: 117 MMHG | WEIGHT: 140 LBS

## 2020-11-10 DIAGNOSIS — E78.00 PURE HYPERCHOLESTEROLEMIA: ICD-10-CM

## 2020-11-10 DIAGNOSIS — K59.00 CONSTIPATION, UNSPECIFIED CONSTIPATION TYPE: ICD-10-CM

## 2020-11-10 DIAGNOSIS — E55.9 VITAMIN D DEFICIENCY: ICD-10-CM

## 2020-11-10 DIAGNOSIS — I10 ESSENTIAL HYPERTENSION: ICD-10-CM

## 2020-11-10 DIAGNOSIS — R22.1 MASS OF RIGHT SIDE OF NECK: Primary | ICD-10-CM

## 2020-11-10 DIAGNOSIS — M85.89 OSTEOPENIA OF MULTIPLE SITES: ICD-10-CM

## 2020-11-10 DIAGNOSIS — R73.01 IFG (IMPAIRED FASTING GLUCOSE): ICD-10-CM

## 2020-11-10 PROCEDURE — G8752 SYS BP LESS 140: HCPCS | Performed by: INTERNAL MEDICINE

## 2020-11-10 PROCEDURE — G8754 DIAS BP LESS 90: HCPCS | Performed by: INTERNAL MEDICINE

## 2020-11-10 PROCEDURE — 99214 OFFICE O/P EST MOD 30 MIN: CPT | Performed by: INTERNAL MEDICINE

## 2020-11-10 PROCEDURE — 3017F COLORECTAL CA SCREEN DOC REV: CPT | Performed by: INTERNAL MEDICINE

## 2020-11-10 PROCEDURE — 1101F PT FALLS ASSESS-DOCD LE1/YR: CPT | Performed by: INTERNAL MEDICINE

## 2020-11-10 PROCEDURE — G8399 PT W/DXA RESULTS DOCUMENT: HCPCS | Performed by: INTERNAL MEDICINE

## 2020-11-10 PROCEDURE — G8427 DOCREV CUR MEDS BY ELIG CLIN: HCPCS | Performed by: INTERNAL MEDICINE

## 2020-11-10 PROCEDURE — G8420 CALC BMI NORM PARAMETERS: HCPCS | Performed by: INTERNAL MEDICINE

## 2020-11-10 PROCEDURE — 1090F PRES/ABSN URINE INCON ASSESS: CPT | Performed by: INTERNAL MEDICINE

## 2020-11-10 PROCEDURE — G8536 NO DOC ELDER MAL SCRN: HCPCS | Performed by: INTERNAL MEDICINE

## 2020-11-10 PROCEDURE — G8510 SCR DEP NEG, NO PLAN REQD: HCPCS | Performed by: INTERNAL MEDICINE

## 2020-11-13 ENCOUNTER — TRANSCRIBE ORDER (OUTPATIENT)
Dept: SCHEDULING | Age: 75
End: 2020-11-13

## 2020-11-13 DIAGNOSIS — Z12.31 SCREENING MAMMOGRAM FOR HIGH-RISK PATIENT: Primary | ICD-10-CM

## 2020-11-19 DIAGNOSIS — E78.00 PURE HYPERCHOLESTEROLEMIA: ICD-10-CM

## 2020-11-19 DIAGNOSIS — I10 ESSENTIAL HYPERTENSION: ICD-10-CM

## 2020-11-19 RX ORDER — TRIAMTERENE AND HYDROCHLOROTHIAZIDE 37.5; 25 MG/1; MG/1
1 CAPSULE ORAL DAILY
Qty: 90 CAP | Refills: 1 | Status: SHIPPED | OUTPATIENT
Start: 2020-11-19 | End: 2021-06-08 | Stop reason: SDUPTHER

## 2020-11-19 RX ORDER — ATORVASTATIN CALCIUM 20 MG/1
20 TABLET, FILM COATED ORAL DAILY
Qty: 90 TAB | Refills: 1 | Status: SHIPPED | OUTPATIENT
Start: 2020-11-19 | End: 2021-06-08 | Stop reason: SDUPTHER

## 2020-11-19 RX ORDER — FELODIPINE 5 MG/1
5 TABLET, EXTENDED RELEASE ORAL DAILY
Qty: 90 TAB | Refills: 1 | Status: SHIPPED | OUTPATIENT
Start: 2020-11-19 | End: 2021-06-08 | Stop reason: SDUPTHER

## 2020-11-19 NOTE — TELEPHONE ENCOUNTER
Patient states she requested refill be done during her appt on 11/10/20. Pharmacy is [facility]. Please call if any questions.  Thank you

## 2020-11-19 NOTE — TELEPHONE ENCOUNTER
Future Appointments:  Future Appointments   Date Time Provider Rabia Bullard   11/30/2020 10:30 AM [facility]   1/4/2021 12:30 PM [facility]   6/8/2021  8:30 AM Sera San MD [facility] BS AMB        Last Appointment With Me:  11/10/2020     Requested Prescriptions     Pending Prescriptions Disp Refills    atorvastatin (LIPITOR) 20 mg tablet 90 Tab 1     Sig: Take 1 Tab by mouth daily.  triamterene-hydroCHLOROthiazide (DYAZIDE) 37.5-25 mg per capsule 90 Cap 1     Sig: Take 1 Cap by mouth daily.  felodipine (PLENDIL SR) 5 mg 24 hr tablet 90 Tab 1     Sig: Take 1 Tab by mouth daily.

## 2020-11-30 ENCOUNTER — HOSPITAL ENCOUNTER (OUTPATIENT)
Dept: CT IMAGING | Age: 75
Discharge: HOME | End: 2020-11-30
Attending: INTERNAL MEDICINE | Admitting: INTERNAL MEDICINE
Payer: MEDICARE

## 2020-11-30 DIAGNOSIS — R22.1 MASS OF RIGHT SIDE OF NECK: ICD-10-CM

## 2020-11-30 LAB — CREAT BLD-MCNC: 1.1 MG/DL (ref 0.6–1.3)

## 2020-11-30 PROCEDURE — 74011000636 HC RX REV CODE- 636: Performed by: INTERNAL MEDICINE

## 2020-11-30 PROCEDURE — 70491 CT SOFT TISSUE NECK W/DYE: CPT

## 2020-11-30 PROCEDURE — 82565 ASSAY OF CREATININE: CPT

## 2020-11-30 RX ORDER — SODIUM CHLORIDE 0.9 % (FLUSH) 0.9 %
10 SYRINGE (ML) INJECTION
Status: COMPLETED | OUTPATIENT
Start: 2020-11-30 | End: 2020-11-30

## 2020-11-30 RX ADMIN — IOPAMIDOL 100 ML: 755 INJECTION, SOLUTION INTRAVENOUS at 11:12

## 2020-11-30 RX ADMIN — Medication 10 ML: at 11:12

## 2020-11-30 NOTE — PROGRESS NOTES
Please call patient back about results  CT still shows findings consistent with a benign lipoma however since this is causing her discomfort she should go see ENT as previously discussed in clinic to see if it can be removed

## 2020-12-01 ENCOUNTER — TELEPHONE (OUTPATIENT)
Dept: INTERNAL MEDICINE CLINIC | Age: 75
End: 2020-12-01

## 2020-12-01 DIAGNOSIS — R22.1 MASS OF RIGHT SIDE OF NECK: Primary | ICD-10-CM

## 2020-12-02 DIAGNOSIS — E87.6 HYPOKALEMIA: Primary | ICD-10-CM

## 2020-12-02 LAB
25(OH)D3+25(OH)D2 SERPL-MCNC: 70.6 NG/ML (ref 30–100)
ALBUMIN SERPL-MCNC: 4.5 G/DL (ref 3.7–4.7)
ALBUMIN/GLOB SERPL: 1.7 {RATIO} (ref 1.2–2.2)
ALP SERPL-CCNC: 74 IU/L (ref 39–117)
ALT SERPL-CCNC: 12 IU/L (ref 0–32)
AST SERPL-CCNC: 18 IU/L (ref 0–40)
BILIRUB SERPL-MCNC: 0.4 MG/DL (ref 0–1.2)
BUN SERPL-MCNC: 15 MG/DL (ref 8–27)
BUN/CREAT SERPL: 14 (ref 12–28)
CALCIUM SERPL-MCNC: 9.6 MG/DL (ref 8.7–10.3)
CHLORIDE SERPL-SCNC: 102 MMOL/L (ref 96–106)
CO2 SERPL-SCNC: 26 MMOL/L (ref 20–29)
CREAT SERPL-MCNC: 1.11 MG/DL (ref 0.57–1)
ERYTHROCYTE [DISTWIDTH] IN BLOOD BY AUTOMATED COUNT: 16.3 % (ref 11.7–15.4)
EST. AVERAGE GLUCOSE BLD GHB EST-MCNC: 120 MG/DL
GLOBULIN SER CALC-MCNC: 2.7 G/DL (ref 1.5–4.5)
GLUCOSE SERPL-MCNC: 107 MG/DL (ref 65–99)
HBA1C MFR BLD: 5.8 % (ref 4.8–5.6)
HCT VFR BLD AUTO: 39.7 % (ref 34–46.6)
HGB BLD-MCNC: 12 G/DL (ref 11.1–15.9)
MCH RBC QN AUTO: 22.6 PG (ref 26.6–33)
MCHC RBC AUTO-ENTMCNC: 30.2 G/DL (ref 31.5–35.7)
MCV RBC AUTO: 75 FL (ref 79–97)
PLATELET # BLD AUTO: 308 X10E3/UL (ref 150–450)
POTASSIUM SERPL-SCNC: 3.3 MMOL/L (ref 3.5–5.2)
PROT SERPL-MCNC: 7.2 G/DL (ref 6–8.5)
RBC # BLD AUTO: 5.32 X10E6/UL (ref 3.77–5.28)
SODIUM SERPL-SCNC: 143 MMOL/L (ref 134–144)
TSH SERPL DL<=0.005 MIU/L-ACNC: 1.07 UIU/ML (ref 0.45–4.5)
WBC # BLD AUTO: 6.5 X10E3/UL (ref 3.4–10.8)

## 2020-12-02 NOTE — TELEPHONE ENCOUNTER
Called out and spoke to pt. Two pt identifiers confirmed. Pt informed of imaging results. See recent imaging result encounter. CT. Pt verbalized understanding of information discussed w/ no further questions at this time. [place] [employer] Dr. Dusty Wright; referral placed.

## 2020-12-02 NOTE — TELEPHONE ENCOUNTER
----- Message from Alejandra Ladd sent at 12/1/2020  6:00 PM EST -----  Regarding: Dr. Zachary Villanueva first and last name and relationship (if not the patient): N/A     Best contact number(s): [phone]     Whose call is being returned: Laura Carrillo from office. Details to clarify the request: Pt returning call from . Laura Carrillo stating may be in regards to imaging results from testing yesterday.

## 2020-12-02 NOTE — PROGRESS NOTES
Called out and spoke to pt. Two pt identifiers confirmed. Pt informed per Dr. Ольга Preston CT still shows findings consistent with a benign lipoma; since this is causing discomfort, she should go see ENT as previously discussed in clinic to see if it can be removed. Gave pt contact info to Dr. Meliza Liao and [facility]. Pt verbalized understanding of information discussed w/ no further questions at this time.

## 2020-12-02 NOTE — PROGRESS NOTES
Please call patient back about results  k level low    Start kloc cont 20meq daily     Repeat bmp in 2 weeks    Also ct results    Rest stable

## 2020-12-02 NOTE — PROGRESS NOTES
Called out and spoke to pt. Two pt identifiers confirmed. Pt informed per Dr. Bhavesh Ac K+ level low and to start klor con 20meq daily--pended. Pt informed per Dr. Bhavesh Ac repeat bmp in 2 weeks--Labs ordered and mailed to pt. Pt informed rest of labs stable. Pt verbalized understanding of information discussed w/ no further questions at this time.

## 2020-12-03 RX ORDER — POTASSIUM CHLORIDE 20 MEQ/1
20 TABLET, EXTENDED RELEASE ORAL DAILY
Qty: 30 TAB | Refills: 3 | Status: SHIPPED | OUTPATIENT
Start: 2020-12-03 | End: 2022-06-08 | Stop reason: ALTCHOICE

## 2021-01-04 ENCOUNTER — HOSPITAL ENCOUNTER (OUTPATIENT)
Dept: MAMMOGRAPHY | Age: 76
Discharge: HOME | End: 2021-01-04
Attending: INTERNAL MEDICINE
Payer: MEDICARE

## 2021-01-04 DIAGNOSIS — Z12.31 SCREENING MAMMOGRAM FOR HIGH-RISK PATIENT: ICD-10-CM

## 2021-01-04 PROCEDURE — 77067 SCR MAMMO BI INCL CAD: CPT

## 2021-01-07 LAB
BUN SERPL-MCNC: 21 MG/DL (ref 8–27)
BUN/CREAT SERPL: 18 (ref 12–28)
CALCIUM SERPL-MCNC: 10 MG/DL (ref 8.7–10.3)
CHLORIDE SERPL-SCNC: 100 MMOL/L (ref 96–106)
CO2 SERPL-SCNC: 28 MMOL/L (ref 20–29)
CREAT SERPL-MCNC: 1.18 MG/DL (ref 0.57–1)
GLUCOSE SERPL-MCNC: 104 MG/DL (ref 65–99)
POTASSIUM SERPL-SCNC: 3.7 MMOL/L (ref 3.5–5.2)
SODIUM SERPL-SCNC: 140 MMOL/L (ref 134–144)

## 2021-06-01 ENCOUNTER — TELEPHONE (OUTPATIENT)
Dept: INTERNAL MEDICINE CLINIC | Age: 76
End: 2021-06-01

## 2021-06-01 DIAGNOSIS — R73.01 IFG (IMPAIRED FASTING GLUCOSE): ICD-10-CM

## 2021-06-01 DIAGNOSIS — I10 ESSENTIAL HYPERTENSION: Primary | ICD-10-CM

## 2021-06-01 DIAGNOSIS — E78.00 PURE HYPERCHOLESTEROLEMIA: ICD-10-CM

## 2021-06-01 NOTE — TELEPHONE ENCOUNTER
Pt needs to have labs re-ordered. Existing  on  (tomorrow)and pt cannot go by then. Please re-orderso pt can complete prior to appt on .     Call pt to update    [phone]

## 2021-06-01 NOTE — TELEPHONE ENCOUNTER
Called Left message w/ Bennett(). Received two pt identifiers  Maureen Both informed labs put in and faxed to [facility] w/[facility]. Cheryl Thompson understanding of the instructions and has no further questions at this time.

## 2021-06-04 LAB
ALBUMIN SERPL-MCNC: 4.3 G/DL (ref 3.7–4.7)
ALBUMIN/GLOB SERPL: 1.4 {RATIO} (ref 1.2–2.2)
ALP SERPL-CCNC: 73 IU/L (ref 48–121)
ALT SERPL-CCNC: 20 IU/L (ref 0–32)
AST SERPL-CCNC: 17 IU/L (ref 0–40)
BILIRUB SERPL-MCNC: 0.3 MG/DL (ref 0–1.2)
BUN SERPL-MCNC: 16 MG/DL (ref 8–27)
BUN/CREAT SERPL: 14 (ref 12–28)
CALCIUM SERPL-MCNC: 9.8 MG/DL (ref 8.7–10.3)
CHLORIDE SERPL-SCNC: 104 MMOL/L (ref 96–106)
CHOLEST SERPL-MCNC: 143 MG/DL (ref 100–199)
CO2 SERPL-SCNC: 25 MMOL/L (ref 20–29)
CREAT SERPL-MCNC: 1.17 MG/DL (ref 0.57–1)
ERYTHROCYTE [DISTWIDTH] IN BLOOD BY AUTOMATED COUNT: 16.2 % (ref 11.7–15.4)
EST. AVERAGE GLUCOSE BLD GHB EST-MCNC: 120 MG/DL
GLOBULIN SER CALC-MCNC: 3.1 G/DL (ref 1.5–4.5)
GLUCOSE SERPL-MCNC: 100 MG/DL (ref 65–99)
HBA1C MFR BLD: 5.8 % (ref 4.8–5.6)
HCT VFR BLD AUTO: 39.9 % (ref 34–46.6)
HDLC SERPL-MCNC: 43 MG/DL
HGB BLD-MCNC: 12.1 G/DL (ref 11.1–15.9)
LDLC SERPL CALC-MCNC: 84 MG/DL (ref 0–99)
MCH RBC QN AUTO: 22.4 PG (ref 26.6–33)
MCHC RBC AUTO-ENTMCNC: 30.3 G/DL (ref 31.5–35.7)
MCV RBC AUTO: 74 FL (ref 79–97)
PLATELET # BLD AUTO: 344 X10E3/UL (ref 150–450)
POTASSIUM SERPL-SCNC: 4 MMOL/L (ref 3.5–5.2)
PROT SERPL-MCNC: 7.4 G/DL (ref 6–8.5)
RBC # BLD AUTO: 5.39 X10E6/UL (ref 3.77–5.28)
SODIUM SERPL-SCNC: 143 MMOL/L (ref 134–144)
TRIGL SERPL-MCNC: 81 MG/DL (ref 0–149)
TSH SERPL DL<=0.005 MIU/L-ACNC: 1.03 UIU/ML (ref 0.45–4.5)
VLDLC SERPL CALC-MCNC: 16 MG/DL (ref 5–40)
WBC # BLD AUTO: 7.2 X10E3/UL (ref 3.4–10.8)

## 2021-06-04 NOTE — PROGRESS NOTES
HISTORY OF PRESENT ILLNESS  Lalito Rodríguez is a 67 y.o. female. HPI     Last here 11/10/20 Pt is here for routine care      Lov, She c/o lump on neck    She saw Dr. Raymond Daniels (ENT) for this, he said nothing to be done per pt   Reviewed ct neck 11/30/20: Small lipoma in the location of the patient's palpable abnormality, unchanged  since the prior CT of 2018. No interval enlargement or development of  inflammatory change.       BP today is 130/77  BP at home 128/70   Continues on dyazide 37.5-25mg and felodipine 5mg daily      Wt is 148 lbs - up 8 lbs x lov    Her weight is within normal ranges     Reviewed labs      She saw Dr. Poornmia Middleton (GI) for constipation   She was given colace      Pt is taking ASA 81mg daily  Continues on lipitor 20mg daily for cholesterol     No longer using protonix  She is not having any issues with heartburn 6/21      Pt failed trazadone    Gave elavil 10mg prn for sleep, doesn't take routinely, doesn't help much     Not having a lot of allergy issues      She saw Dr. Khari Samson ([facility]) for kidney cyst  Reviewed note 12/19/19   She states no f/u needed      Continues on vit D OTC 1000U daily     She stopped taking klor con a month ago, levels good on labs, will stay off of this     Reviewed mammo    Doesn't drink alcohol, no difficulty with hearing, no safety equipment  Pt lives with      Pt is functionally independent       No memory concerns   Knows the month, date, year, location   Can recall 3/3 objects       ACP on file.  SDM is her  Neva Jin).        Recall saw cardiologist in past for CP. No blockage was found.  No recurrent CP.      PREVENTIVE:    Colonoscopy: 12/19Dr. Poornima Middleton, repeat 5 years,  severe diverticulosis  AAA: CT abd 10/18, negative  Pap: Dr. Luevano , 7/15,  every other year, hysterectomy and BSO in 1994, will only f/u prn  Mammogram: 1/4/21 nl  DEXA: 7/720, osteopenia   Tdap: 4/08/2015  Pneumovax: 9/02/2014  Ychwize73: 11/03/2016  Zostavax: 2010    Shingrix: both  Flu shot: 10/20  A1c:  6.2, 10/17 5.7,  5.7,  5.9  5.9   6.0  5.8  5.8  Eye exam: Dr. Raqule David PA, 3/21  Lipids:  ldl 80    Patient Active Problem List    Diagnosis Date Noted    IFG (impaired fasting glucose) 2019    Angioedema 2016    Hyperlipidemia 2013    CAD (coronary artery disease) 10/01/2012    H/O allergy     Hypertension      Current Outpatient Medications   Medication Sig Dispense Refill    atorvastatin (LIPITOR) 20 mg tablet Take 1 Tablet by mouth daily. 90 Tablet 1    triamterene-hydroCHLOROthiazide (DYAZIDE) 37.5-25 mg per capsule Take 1 Capsule by mouth daily. 90 Capsule 1    felodipine (PLENDIL SR) 5 mg 24 hr tablet Take 1 Tablet by mouth daily. 90 Tablet 1    potassium chloride (K-DUR, KLOR-CON) 20 mEq tablet Take 1 Tab by mouth daily. 30 Tab 3    Ketoconazole 2 % topical foam Apply  to affected area two (2) times a day. 100 g 0    dorzolamide-timolol (COSOPT) 22.3-6.8 mg/mL ophthalmic solution       Cholecalciferol, Vitamin D3, (VITAMIN D3) 1,000 unit cap Take  by mouth.  fexofenadine (ALLEGRA) 180 mg tablet Take 180 mg by mouth daily.  aspirin 81 mg tablet Take 81 mg by mouth daily.          Past Surgical History:   Procedure Laterality Date    COLONOSCOPY N/A 2019    COL[facility]y [facility] at [facility] AM[facility]UL[facility]    HX  SECTION      HX CHOLECYSTECTOMY  14    lap mando with cholangiogram    HX HYSTERECTOMY      HX OTHER SURGICAL  14    ERCPwith biliary sphincterotomy CBD balloon sweeps      HX TUBAL LIGATION      ND ABDOMEN SURGERY PROC UNLISTED  14    LAPAROSCOPIC CHOLECYSTECTOMY with GRAMS      Lab Results   Component Value Date/Time    WBC 7.2 2021 09:59 AM    HGB 12.1 2021 09:59 AM    HCT 39.9 2021 09:59 AM    PLATELET 730  09:59 AM    MCV 74 (L) 2021 09:59 AM     Lab Results   Component Value Date/Time Cholesterol, total 143 06/03/2021 09:59 AM    HDL Cholesterol 43 06/03/2021 09:59 AM    LDL, calculated 84 06/03/2021 09:59 AM    LDL, calculated 74 06/18/2020 09:00 AM    Triglyceride 81 06/03/2021 09:59 AM     Lab Results   Component Value Date/Time    GFR est non-AA 45 (L) 06/03/2021 09:59 AM    GFRNA, POC 48 (L) 11/30/2020 10:52 AM    GFR est AA 52 (L) 06/03/2021 09:59 AM    GFRAA, POC 59 (L) 11/30/2020 10:52 AM    Creatinine 1.17 (H) 06/03/2021 09:59 AM    Creatinine (POC) 1.1 11/30/2020 10:52 AM    BUN 16 06/03/2021 09:59 AM    Sodium 143 06/03/2021 09:59 AM    Potassium 4.0 06/03/2021 09:59 AM    Chloride 104 06/03/2021 09:59 AM    CO2 25 06/03/2021 09:59 AM        Review of Systems   Respiratory: Negative for shortness of breath. Cardiovascular: Negative for chest pain. Physical Exam  Constitutional:       General: She is not in acute distress. Appearance: Normal appearance. She is not ill-appearing, toxic-appearing or diaphoretic. HENT:      Head: Normocephalic and atraumatic. Right Ear: External ear normal.      Left Ear: External ear normal.   Eyes:      General:         Right eye: No discharge. Left eye: No discharge. Conjunctiva/sclera: Conjunctivae normal.      Pupils: Pupils are equal, round, and reactive to light. Neck:      Comments: [place] sized soft lipoma on R clavicular border  Cardiovascular:      Rate and Rhythm: Normal rate and regular rhythm. Pulses: Normal pulses. Heart sounds: Normal heart sounds. No murmur heard. No friction rub. No gallop. Pulmonary:      Effort: No respiratory distress. Breath sounds: Normal breath sounds. No wheezing or rales. Chest:      Chest wall: No tenderness. Musculoskeletal:         General: Normal range of motion. Cervical back: Normal range of motion and neck supple. Right lower leg: No edema. Left lower leg: No edema. Skin:     General: Skin is warm and dry.    Neurological: Mental Status: She is alert and oriented to person, place, and time. Mental status is at baseline. Coordination: Coordination normal.      Gait: Gait normal.   Psychiatric:         Mood and Affect: Mood normal.         Behavior: Behavior normal.         ASSESSMENT and PLAN    ICD-10-CM ICD-9-CM    1. Medicare annual wellness visit, subsequent  L73.86 C09.1 METABOLIC PANEL, COMPREHENSIVE      HEMOGLOBIN A1C WITH EAG      METABOLIC PANEL, COMPREHENSIVE      HEMOGLOBIN A1C WITH EAG   2. Essential hypertension            I10 401.9 triamterene-hydroCHLOROthiazide (DYAZIDE) 37.5-25 mg per capsule   Controlled on Dyazide felodipine continue         felodipine (PLENDIL SR) 5 mg 24 hr tablet      METABOLIC PANEL, COMPREHENSIVE      HEMOGLOBIN A1C WITH EAG      METABOLIC PANEL, COMPREHENSIVE      HEMOGLOBIN A1C WITH EAG   3. Coronary artery disease involving native coronary artery of native heart without angina pectoris  U90.23 319.25 METABOLIC PANEL, COMPREHENSIVE      HEMOGLOBIN A1C WITH EAG   Medically managed previously saw cardiologist will go back for symptoms   METABOLIC PANEL, COMPREHENSIVE      HEMOGLOBIN A1C WITH EAG   4. Pure hypercholesterolemia  E78.00 272.0 atorvastatin (LIPITOR) 20 mg tablet      METABOLIC PANEL, COMPREHENSIVE      HEMOGLOBIN A1C WITH EAG   Controlled Lipitor monitor lipids   METABOLIC PANEL, COMPREHENSIVE      HEMOGLOBIN A1C WITH EAG   5. IFG (impaired fasting glucose)  I12.83 716.17 METABOLIC PANEL, COMPREHENSIVE      HEMOGLOBIN A1C WITH EAG   Monitor A1c diet controlled   METABOLIC PANEL, COMPREHENSIVE      HEMOGLOBIN A1C WITH EAG   6. Angioedema, subsequent encounter  T78. 3XXD P33.32 METABOLIC PANEL, COMPREHENSIVE     995.1 HEMOGLOBIN A1C WITH EAG   No recent episodes   METABOLIC PANEL, COMPREHENSIVE      HEMOGLOBIN A1C WITH EAG   7.  Hypokalemia  V41.8 195.2 METABOLIC PANEL, COMPREHENSIVE      HEMOGLOBIN A1C WITH EAG   Stop taking her Klor-Con recent K levels good will remain off Klor-Con for now   METABOLIC PANEL, COMPREHENSIVE      HEMOGLOBIN A1C WITH EAG      Depression screen reviewed and negative      Scribed by Mark Wilcox, as dictated by Dr. Macy Proctor. Current diagnosis and concerns discussed with pt at length. Pt understands risks and benefits or current treatment plan and medications, and accepts the treatment and medication with any possible risks. Pt asks appropriate questions, which were answered. Pt was instructed to call with any concerns or problems. I have reviewed the note documented by the scribe. The services provided are my own.   The documentation is accurate

## 2021-06-08 ENCOUNTER — OFFICE VISIT (OUTPATIENT)
Dept: INTERNAL MEDICINE CLINIC | Age: 76
End: 2021-06-08
Payer: MEDICARE

## 2021-06-08 VITALS
DIASTOLIC BLOOD PRESSURE: 77 MMHG | WEIGHT: 148 LBS | HEART RATE: 90 BPM | RESPIRATION RATE: 18 BRPM | BODY MASS INDEX: 23.78 KG/M2 | OXYGEN SATURATION: 99 % | SYSTOLIC BLOOD PRESSURE: 130 MMHG | TEMPERATURE: 97.5 F | HEIGHT: 66 IN

## 2021-06-08 DIAGNOSIS — E78.00 PURE HYPERCHOLESTEROLEMIA: ICD-10-CM

## 2021-06-08 DIAGNOSIS — E87.6 HYPOKALEMIA: ICD-10-CM

## 2021-06-08 DIAGNOSIS — Z00.00 MEDICARE ANNUAL WELLNESS VISIT, SUBSEQUENT: Primary | ICD-10-CM

## 2021-06-08 DIAGNOSIS — I25.10 CORONARY ARTERY DISEASE INVOLVING NATIVE CORONARY ARTERY OF NATIVE HEART WITHOUT ANGINA PECTORIS: ICD-10-CM

## 2021-06-08 DIAGNOSIS — R73.01 IFG (IMPAIRED FASTING GLUCOSE): ICD-10-CM

## 2021-06-08 DIAGNOSIS — T78.3XXD ANGIOEDEMA, SUBSEQUENT ENCOUNTER: ICD-10-CM

## 2021-06-08 DIAGNOSIS — I10 ESSENTIAL HYPERTENSION: ICD-10-CM

## 2021-06-08 PROCEDURE — G8754 DIAS BP LESS 90: HCPCS | Performed by: INTERNAL MEDICINE

## 2021-06-08 PROCEDURE — 1090F PRES/ABSN URINE INCON ASSESS: CPT | Performed by: INTERNAL MEDICINE

## 2021-06-08 PROCEDURE — G8420 CALC BMI NORM PARAMETERS: HCPCS | Performed by: INTERNAL MEDICINE

## 2021-06-08 PROCEDURE — G8510 SCR DEP NEG, NO PLAN REQD: HCPCS | Performed by: INTERNAL MEDICINE

## 2021-06-08 PROCEDURE — 99213 OFFICE O/P EST LOW 20 MIN: CPT | Performed by: INTERNAL MEDICINE

## 2021-06-08 PROCEDURE — 1101F PT FALLS ASSESS-DOCD LE1/YR: CPT | Performed by: INTERNAL MEDICINE

## 2021-06-08 PROCEDURE — G8536 NO DOC ELDER MAL SCRN: HCPCS | Performed by: INTERNAL MEDICINE

## 2021-06-08 PROCEDURE — G8752 SYS BP LESS 140: HCPCS | Performed by: INTERNAL MEDICINE

## 2021-06-08 PROCEDURE — G0439 PPPS, SUBSEQ VISIT: HCPCS | Performed by: INTERNAL MEDICINE

## 2021-06-08 PROCEDURE — G8427 DOCREV CUR MEDS BY ELIG CLIN: HCPCS | Performed by: INTERNAL MEDICINE

## 2021-06-08 PROCEDURE — G8399 PT W/DXA RESULTS DOCUMENT: HCPCS | Performed by: INTERNAL MEDICINE

## 2021-06-08 RX ORDER — TRIAMTERENE AND HYDROCHLOROTHIAZIDE 37.5; 25 MG/1; MG/1
1 CAPSULE ORAL DAILY
Qty: 90 CAPSULE | Refills: 1 | Status: SHIPPED | OUTPATIENT
Start: 2021-06-08 | End: 2021-12-08 | Stop reason: SDUPTHER

## 2021-06-08 RX ORDER — FELODIPINE 5 MG/1
5 TABLET, EXTENDED RELEASE ORAL DAILY
Qty: 90 TABLET | Refills: 1 | Status: SHIPPED | OUTPATIENT
Start: 2021-06-08 | End: 2021-12-08 | Stop reason: SDUPTHER

## 2021-06-08 RX ORDER — ATORVASTATIN CALCIUM 20 MG/1
20 TABLET, FILM COATED ORAL DAILY
Qty: 90 TABLET | Refills: 1 | Status: SHIPPED | OUTPATIENT
Start: 2021-06-08 | End: 2021-12-08 | Stop reason: SDUPTHER

## 2021-06-08 NOTE — PROGRESS NOTES
This is the Subsequent Medicare Annual Wellness Exam, performed 12 months or more after the Initial AWV or the last Subsequent AWV    I have reviewed the patient's medical history in detail and updated the computerized patient record. Assessment/Plan   Education and counseling provided:  Are appropriate based on today's review and evaluation    1. Medicare annual wellness visit, subsequent       Depression Risk Factor Screening     3 most recent PHQ Screens 6/8/2021   Little interest or pleasure in doing things Not at all   Feeling down, depressed, irritable, or hopeless Not at all   Total Score PHQ 2 0       Alcohol Risk Screen    Do you average more than 1 drink per night or more than 7 drinks a week:  No    On any one occasion in the past three months have you have had more than 3 drinks containing alcohol:  No        Functional Ability and Level of Safety    Hearing: Hearing is good. Activities of Daily Living: The home contains: no safety equipment. Patient does total self care      Ambulation: with no difficulty     Fall Risk:  Fall Risk Assessment, last 12 mths 6/8/2021   Able to walk? Yes   Fall in past 12 months? 0   Do you feel unsteady?  0   Are you worried about falling 0      Abuse Screen:  Patient is not abused   lives w     Cognitive Screening    Has your family/caregiver stated any concerns about your memory: no     Cognitive Screening: Normal - Mini Cog Test     No memory concerns   Pt knows the month, day and year   Can recall 3/3 objects     Health Maintenance Due     Health Maintenance Due   Topic Date Due    COVID-19 Vaccine (1) Never done    Medicare Yearly Exam  06/05/2021       Patient Care Team   Patient Care Team:  Gerhardt Coles, MD as PCP - General (Internal Medicine)  Gerhardt Coles, MD as PCP - [facility] Empaneled Provider  Nuno Brown MD as Consulting Provider (Obstetrics & Gynecology)  Gage Valladares MD (Gastroenterology)  Tegan Camarena (Ophthalmology)  Edie Reyes MD (Urology)  Cale Sanchez MD (Otolaryngology)     uypdated    History     Patient Active Problem List   Diagnosis Code    H/O allergy Z88.9    Hypertension I10    CAD (coronary artery disease) I25.10    Hyperlipidemia E78.5    Angioedema T78. 3XXA    IFG (impaired fasting glucose) R73.01     Past Medical History:   Diagnosis Date    Dyspepsia and other specified disorders of function of stomach     GERD (gastroesophageal reflux disease)     H/O allergy     Hypercholesterolemia     Hypertension     Indigestion     Other ill-defined conditions(799.89)     elevated cholesterol      Past Surgical History:   Procedure Laterality Date    COLONOSCOPY N/A 2019    COLONOSCOPY performed by Karly Cuevas MD at [facility] AMBULATORY OR    HX  SECTION      HX CHOLECYSTECTOMY  14    lap mando with cholangiogram    HX HYSTERECTOMY      HX OTHER SURGICAL  14    ERCPwith biliary sphincterotomy CBD balloon sweeps      HX TUBAL LIGATION      [place] ABDOMEN SURGERY PROC UNLISTED  14    LAPAROSCOPIC CHOLECYSTECTOMY with GRAMS     Current Outpatient Medications   Medication Sig Dispense Refill    potassium chloride (K-DUR, KLOR-CON) 20 mEq tablet Take 1 Tab by mouth daily. 30 Tab 3    atorvastatin (LIPITOR) 20 mg tablet Take 1 Tab by mouth daily. 90 Tab 1    triamterene-hydroCHLOROthiazide (DYAZIDE) 37.5-25 mg per capsule Take 1 Cap by mouth daily. 90 Cap 1    felodipine (PLENDIL SR) 5 mg 24 hr tablet Take 1 Tab by mouth daily. 90 Tab 1    amitriptyline (ELAVIL) 10 mg tablet TAKE 1 TABLET BY MOUTH EVERY DAY AT NIGHT 30 Tab 1    Ketoconazole 2 % topical foam Apply  to affected area two (2) times a day. 100 g 0    dorzolamide-timolol (COSOPT) 22.3-6.8 mg/mL ophthalmic solution       Cholecalciferol, Vitamin D3, (VITAMIN D3) 1,000 unit cap Take  by mouth.  fexofenadine (ALLEGRA) 180 mg tablet Take 180 mg by mouth daily.         aspirin 81 mg tablet Take 81 mg by mouth daily. No Known Allergies    Family History   Problem Relation Age of Onset    Hypertension Mother     Cancer Brother         prostate     Social History     Tobacco Use    Smoking status: Never Smoker    Smokeless tobacco: Never Used   Substance Use Topics    Alcohol use: No     ACP on file.  SDM is her  Tanika Zepeda).                Colonoscopy: 12/19[facility], repeat 5 years,  severe diverticulosis  AAA: CT abd 10/18, negative  Pap: Dr. Cassidy Vaughn, 7/15,  every other year, hysterectomy and BSO in 1994, will only f/u prn  Mammogram: 1/4/21 nl annual   DEXA: 7/7/20, osteopenia  q2 years    Tdap: 4/08/2015  Pneumovax: 9/02/2014  [id number]: 11/03/2016  Zostavax: 2010    Shingrix: both  Flu shot: 10/20    Eye exam: Dr. Venancio Zamarripa PA, 3/21 annual     A1c:  6/21 5.8 q6mo    Lipids: 6/21 ldl 84 annual       Medication reconciliation completed by MA and reviewed by me. Medical/surgical/social/family history reviewed and updated by me. Patient provided AVS and preventative screening table. Patient verbalized understanding of all information discussed.       Sean Buchanan MD

## 2021-12-07 LAB
ALBUMIN SERPL-MCNC: 4.2 G/DL (ref 3.7–4.7)
ALBUMIN/GLOB SERPL: 1.4 {RATIO} (ref 1.2–2.2)
ALP SERPL-CCNC: 81 IU/L (ref 44–121)
ALT SERPL-CCNC: 17 IU/L (ref 0–32)
AST SERPL-CCNC: 15 IU/L (ref 0–40)
BILIRUB SERPL-MCNC: 0.4 MG/DL (ref 0–1.2)
BUN SERPL-MCNC: 21 MG/DL (ref 8–27)
BUN/CREAT SERPL: 17 (ref 12–28)
CALCIUM SERPL-MCNC: 9.5 MG/DL (ref 8.7–10.3)
CHLORIDE SERPL-SCNC: 106 MMOL/L (ref 96–106)
CO2 SERPL-SCNC: 25 MMOL/L (ref 20–29)
CREAT SERPL-MCNC: 1.24 MG/DL (ref 0.57–1)
EST. AVERAGE GLUCOSE BLD GHB EST-MCNC: 114 MG/DL
GLOBULIN SER CALC-MCNC: 2.9 G/DL (ref 1.5–4.5)
GLUCOSE SERPL-MCNC: 104 MG/DL (ref 65–99)
HBA1C MFR BLD: 5.6 % (ref 4.8–5.6)
POTASSIUM SERPL-SCNC: 3.9 MMOL/L (ref 3.5–5.2)
PROT SERPL-MCNC: 7.1 G/DL (ref 6–8.5)
SODIUM SERPL-SCNC: 144 MMOL/L (ref 134–144)

## 2021-12-07 NOTE — PROGRESS NOTES
HISTORY OF PRESENT ILLNESS  Roly Gmoez is a 67 y.o. female. HPI     Last here 6/08/21. Pt is here for routine care.       BP today is 136/73  Continues on dyazide 37.5-25mg and felodipine 5mg daily--has not taken BP meds yet this AM     Wt today is 149 lbs, up 1 lb x lov   Her weight is within normal ranges     Reviewed labs   Ordered labs     She saw Dr. Filippo Crowder (GI) for constipation   She was given colace      Pt is taking ASA 81mg daily  Continues on lipitor 20mg daily for cholesterol     No longer using protonix  She is not having any issues with heartburn 12/21      Pt failed trazadone    Gave elavil 10mg prn for sleep, has not been using it  Having trouble sleeping still work on sleep hygiene     Not having a lot of allergy issues      She saw Dr. Danay Abad (uro) for kidney cyst  Reviewed note 12/19/19 --discussed complicated cyst follow-up as needed stable for the past      Saw Dr. Camila Escobar (ENT)  in the past for mass R neck, no longer needs to follow with this physician      Continues on vit D OTC 1000U daily      No longer taking klor con       ACP on file.  SDM is her  Violet Saunders).        Recall saw cardiologist in past for CP. No blockage was found.  No recurrent CP.      PREVENTIVE:    Colonoscopy: 12/19DrElo Crowder, repeat 5 years,  severe diverticulosis  AAA: CT abd 10/18, negative  Pap: Dr. Fermin De La Vega, 7/15,  every other year, hysterectomy and BSO in 1994, will only f/u prn  Mammogram: 1/4/21 nl, due   DEXA: 7/7/20, osteopenia   Tdap: 4/08/2015  Pneumovax: 9/02/2014  [id number]: 11/03/2016  Zostavax: 2010    Shingrix: both  Flu shot: 10/15/21  A1c: 10/17 5.7, 4/18 5.7, 11/18 5.9 5/19 5.9  11/19 6.0 6/20 5.8 6/21 5.8 6/21 5.6  Eye exam: Dr. Tanvi CURRIE, 9/21  Lipids: 6/21 ldl 80  Covid: both + booster Rolando Plaza)    Patient Active Problem List    Diagnosis Date Noted    IFG (impaired fasting glucose) 11/05/2019    Angioedema 08/25/2016    Hyperlipidemia 04/01/2013    CAD (coronary artery disease) 10/01/2012    H/O allergy     Hypertension      Current Outpatient Medications   Medication Sig Dispense Refill    atorvastatin (LIPITOR) 20 mg tablet Take 1 Tablet by mouth daily. 90 Tablet 1    triamterene-hydroCHLOROthiazide (DYAZIDE) 37.5-25 mg per capsule Take 1 Capsule by mouth daily. 90 Capsule 1    felodipine (PLENDIL SR) 5 mg 24 hr tablet Take 1 Tablet by mouth daily. 90 Tablet 1    potassium chloride (K-DUR, KLOR-CON) 20 mEq tablet Take 1 Tab by mouth daily. 30 Tab 3    Ketoconazole 2 % topical foam Apply  to affected area two (2) times a day. 100 g 0    dorzolamide-timolol (COSOPT) 22.3-6.8 mg/mL ophthalmic solution       Cholecalciferol, Vitamin D3, (VITAMIN D3) 1,000 unit cap Take  by mouth.  fexofenadine (ALLEGRA) 180 mg tablet Take 180 mg by mouth daily.  aspirin 81 mg tablet Take 81 mg by mouth daily.          Past Surgical History:   Procedure Laterality Date    COLONOSCOPY N/A 2019    COLONOSCOPY performed by Domi Beckford MD at [facility] AMBULATORY OR    HX  SECTION      HX CHOLECYSTECTOMY  14    lap mando with cholangiogram    HX HYSTERECTOMY      HX OTHER SURGICAL  14    ERCPwith biliary sphincterotomy CBD balloon sweeps      HX TUBAL LIGATION      [place] ABDOMEN SURGERY PROC UNLISTED  14    LAPAROSCOPIC CHOLECYSTECTOMY with GRAMS      Lab Results   Component Value Date/Time    WBC 7.2 2021 09:59 AM    HGB 12.1 2021 09:59 AM    HCT 39.9 2021 09:59 AM    PLATELET 581  09:59 AM    MCV 74 (L) 2021 09:59 AM     Lab Results   Component Value Date/Time    Cholesterol, total 143 2021 09:59 AM    HDL Cholesterol 43 2021 09:59 AM    LDL, calculated 84 2021 09:59 AM    LDL, calculated 74 2020 09:00 AM    Triglyceride 81 2021 09:59 AM     Lab Results   Component Value Date/Time    GFR est non-AA 42 (L) 2021 08:37 AM    GFRNA, POC 48 (L) 2020 10:52 AM    GFR est AA 49 (L) 12/06/2021 08:37 AM    GFRAA, POC 59 (L) 11/30/2020 10:52 AM    Creatinine 1.24 (H) 12/06/2021 08:37 AM    Creatinine (POC) 1.1 11/30/2020 10:52 AM    BUN 21 12/06/2021 08:37 AM    Sodium 144 12/06/2021 08:37 AM    Potassium 3.9 12/06/2021 08:37 AM    Chloride 106 12/06/2021 08:37 AM    CO2 25 12/06/2021 08:37 AM        Review of Systems   Respiratory: Negative for shortness of breath. Cardiovascular: Negative for chest pain. Physical Exam  Constitutional:       General: She is not in acute distress. Appearance: Normal appearance. She is not ill-appearing, toxic-appearing or diaphoretic. HENT:      Head: Normocephalic and atraumatic. Right Ear: External ear normal.      Left Ear: External ear normal.   Eyes:      General:         Right eye: No discharge. Left eye: No discharge. Conjunctiva/sclera: Conjunctivae normal.      Pupils: Pupils are equal, round, and reactive to light. Cardiovascular:      Rate and Rhythm: Normal rate and regular rhythm. Heart sounds: No murmur heard. No friction rub. No gallop. Pulmonary:      Effort: No respiratory distress. Breath sounds: Normal breath sounds. No wheezing or rales. Chest:      Chest wall: No tenderness. Musculoskeletal:         General: Normal range of motion. Cervical back: Normal range of motion and neck supple. Right lower leg: No edema. Left lower leg: No edema. Skin:     General: Skin is warm and dry. Neurological:      Mental Status: She is alert and oriented to person, place, and time. Mental status is at baseline. Coordination: Coordination normal.      Gait: Gait normal.   Psychiatric:         Mood and Affect: Mood normal.         Behavior: Behavior normal.         ASSESSMENT and PLAN    ICD-10-CM ICD-9-CM    1.  Breast screening  Z12.39 V76.10 WIN MAMMO BI SCREENING INCL CAD      LIPID PANEL      METABOLIC PANEL, COMPREHENSIVE   Mammogram ordered   CBC W/O DIFF      TSH 3RD GENERATION      HEMOGLOBIN A1C WITH EAG   2. Essential hypertension  I10 401.9 triamterene-hydroCHLOROthiazide (DYAZIDE) 37.5-25 mg per capsule      felodipine (PLENDIL SR) 5 mg 24 hr tablet   Controlled on Dyazide blood pain see below   LIPID PANEL      METABOLIC PANEL, COMPREHENSIVE      CBC W/O DIFF      TSH 3RD GENERATION      HEMOGLOBIN A1C WITH EAG   3. Pure hypercholesterolemia  E78.00 272.0 atorvastatin (LIPITOR) 20 mg tablet      LIPID PANEL   Controlled Lipitor 20 mg in the past check lipids adjust dose as needed   METABOLIC PANEL, COMPREHENSIVE      CBC W/O DIFF      TSH 3RD GENERATION      HEMOGLOBIN A1C WITH EAG   4. Kidney cyst, acquired  N28.1 593.2 LIPID PANEL      METABOLIC PANEL, COMPREHENSIVE   Had urology evaluation 2019 which discussed no further evaluation   CBC W/O DIFF      TSH 3RD GENERATION      HEMOGLOBIN A1C WITH EAG   5. Primary hypertension  I10 401.9 LIPID PANEL      METABOLIC PANEL, COMPREHENSIVE   Has been controlled on Dyazide and felodipine    Discussed making sure to take her medication   CBC W/O DIFF   The day of office visit    And monitoring blood pressures at home       TSH 3RD GENERATION      HEMOGLOBIN A1C WITH EAG   6. IFG (impaired fasting glucose)  R73.01 790.21 LIPID PANEL      METABOLIC PANEL, COMPREHENSIVE   Check A1c diet controlled   CBC W/O DIFF      TSH 3RD GENERATION      HEMOGLOBIN A1C WITH EAG   7. Coronary artery disease involving native coronary artery of native heart without angina pectoris  I25.10 414.01 LIPID PANEL      METABOLIC PANEL, COMPREHENSIVE   History of in the past previously saw cardiology no active signs or symptoms of CAD continues on daily aspirin   CBC W/O DIFF      TSH 3RD GENERATION      HEMOGLOBIN A1C WITH EAG        Depression screen reviewed and negative     Scribed by Artis Valentine of [facility], as dictated by Dr. Belgica Frazier. Current diagnosis and concerns discussed with pt at length.  Pt understands risks and benefits or current treatment plan and medications, and accepts the treatment and medication with any possible risks. Pt asks appropriate questions, which were answered. Pt was instructed to call with any concerns or problems. I have reviewed the note documented by the scribe. The services provided are my own.   The documentation is accurate

## 2021-12-08 ENCOUNTER — OFFICE VISIT (OUTPATIENT)
Dept: INTERNAL MEDICINE CLINIC | Age: 76
End: 2021-12-08
Payer: MEDICARE

## 2021-12-08 VITALS
SYSTOLIC BLOOD PRESSURE: 136 MMHG | RESPIRATION RATE: 16 BRPM | DIASTOLIC BLOOD PRESSURE: 73 MMHG | OXYGEN SATURATION: 97 % | HEIGHT: 66 IN | WEIGHT: 149 LBS | HEART RATE: 75 BPM | BODY MASS INDEX: 23.95 KG/M2 | TEMPERATURE: 98.1 F

## 2021-12-08 DIAGNOSIS — I10 PRIMARY HYPERTENSION: ICD-10-CM

## 2021-12-08 DIAGNOSIS — E78.00 PURE HYPERCHOLESTEROLEMIA: ICD-10-CM

## 2021-12-08 DIAGNOSIS — I25.10 CORONARY ARTERY DISEASE INVOLVING NATIVE CORONARY ARTERY OF NATIVE HEART WITHOUT ANGINA PECTORIS: ICD-10-CM

## 2021-12-08 DIAGNOSIS — R73.01 IFG (IMPAIRED FASTING GLUCOSE): ICD-10-CM

## 2021-12-08 DIAGNOSIS — Z12.39 BREAST SCREENING: Primary | ICD-10-CM

## 2021-12-08 DIAGNOSIS — N28.1 KIDNEY CYST, ACQUIRED: ICD-10-CM

## 2021-12-08 DIAGNOSIS — I10 ESSENTIAL HYPERTENSION: ICD-10-CM

## 2021-12-08 PROCEDURE — 1101F PT FALLS ASSESS-DOCD LE1/YR: CPT | Performed by: INTERNAL MEDICINE

## 2021-12-08 PROCEDURE — G8754 DIAS BP LESS 90: HCPCS | Performed by: INTERNAL MEDICINE

## 2021-12-08 PROCEDURE — G8399 PT W/DXA RESULTS DOCUMENT: HCPCS | Performed by: INTERNAL MEDICINE

## 2021-12-08 PROCEDURE — 99214 OFFICE O/P EST MOD 30 MIN: CPT | Performed by: INTERNAL MEDICINE

## 2021-12-08 PROCEDURE — G8510 SCR DEP NEG, NO PLAN REQD: HCPCS | Performed by: INTERNAL MEDICINE

## 2021-12-08 PROCEDURE — G8427 DOCREV CUR MEDS BY ELIG CLIN: HCPCS | Performed by: INTERNAL MEDICINE

## 2021-12-08 PROCEDURE — G8536 NO DOC ELDER MAL SCRN: HCPCS | Performed by: INTERNAL MEDICINE

## 2021-12-08 PROCEDURE — G8752 SYS BP LESS 140: HCPCS | Performed by: INTERNAL MEDICINE

## 2021-12-08 PROCEDURE — 1090F PRES/ABSN URINE INCON ASSESS: CPT | Performed by: INTERNAL MEDICINE

## 2021-12-08 PROCEDURE — G8420 CALC BMI NORM PARAMETERS: HCPCS | Performed by: INTERNAL MEDICINE

## 2021-12-08 RX ORDER — ATORVASTATIN CALCIUM 20 MG/1
20 TABLET, FILM COATED ORAL DAILY
Qty: 90 TABLET | Refills: 1 | Status: SHIPPED | OUTPATIENT
Start: 2021-12-08 | End: 2022-05-04

## 2021-12-08 RX ORDER — TRIAMTERENE AND HYDROCHLOROTHIAZIDE 37.5; 25 MG/1; MG/1
1 CAPSULE ORAL DAILY
Qty: 90 CAPSULE | Refills: 1 | Status: SHIPPED | OUTPATIENT
Start: 2021-12-08 | End: 2022-05-04

## 2021-12-08 RX ORDER — FELODIPINE 5 MG/1
5 TABLET, EXTENDED RELEASE ORAL DAILY
Qty: 90 TABLET | Refills: 1 | Status: SHIPPED | OUTPATIENT
Start: 2021-12-08 | End: 2022-05-04

## 2022-01-08 ENCOUNTER — HOSPITAL ENCOUNTER (OUTPATIENT)
Dept: MAMMOGRAPHY | Age: 77
Discharge: HOME | End: 2022-01-08
Attending: INTERNAL MEDICINE
Payer: MEDICARE

## 2022-01-08 DIAGNOSIS — Z12.39 BREAST SCREENING: ICD-10-CM

## 2022-01-08 PROCEDURE — 77067 SCR MAMMO BI INCL CAD: CPT

## 2022-03-20 PROBLEM — R73.01 IFG (IMPAIRED FASTING GLUCOSE): Status: ACTIVE | Noted: 2019-11-05

## 2022-06-03 NOTE — PROGRESS NOTES
HISTORY OF PRESENT ILLNESS  Jessica Sandhu is a 67 y.o. female. HPI     On Saturday, pt reports an episode of swelling and pain in her R hand, she states the sxs have reduced since then   Denies fevers and chills and erythema   Denies eating any trigger foods such as red wine or shellfish   Denies any OTC therapy  Currently, the sxs only persists when she bends her hand   Rx for topical Diclofenac     Last here 12/08/21. Pt is here for routine care.        Has history of hypertension  BP today is 129/72  Home monitoring indicates average BP measures of 126/70s   Continues on dyazide 37.5-25mg and felodipine 5mg daily--has not taken BP meds yet this AM     Wt was 149 lbs, down 4 lb x lov   Her weight is within normal ranges     Reviewed labs   Will order labs today      She saw Dr. Clyde Morales (GI) for constipation   She was given colace      Pt is taking ASA 81mg daily  Continues on lipitor 20mg daily for cholesterol     No longer using protonix  She is not having any issues with heartburn 6/22      Pt failed trazadone    Gave elavil 10mg prn for sleep, has not been using it  Having trouble sleeping still work on sleep hygiene     Not having a lot of allergy issues      She saw Dr. Kimi Hutchison (uro) for kidney cyst  Reviewed note 12/19/19 --discussed complicated cyst follow-up as needed stable for the past        Saw Dr. Alden Bryan (ENT)  in the past for mass R neck, no longer needs to follow with this physician      Continues on vit D OTC 1000U daily      No longer taking klor con    Reviewed mammo 1/08/22: negative       ACP on file.  SDM is her  Rekha Leyva).        Recall saw cardiologist in past for CP. No blockage was found.  No recurrent CP.      Pt lives with      Pt is functionally independent   Pt has safety equipment installed at home     No memory concerns   Knows the month, date, year, location   Can recall 3/3 objects   PREVENTIVE:    Colonoscopy: 12/19DrElo Morales, repeat 5 years,  severe diverticulosis  AAA: CT abd 10/18, negative  Pap: Dr. Doyle Prior, 7/15,  every other year, hysterectomy and BSO in , will only f/u prn  Mammogram: 22 negative  DEXA: 20, osteopenia, due reminded   Tdap: 2015  Pneumovax: 2014  [id number]: 2016  Zostavax: 2010    Shingrix: both  Flu shot: 10/15/21  A1c:    5.6,  5.6  Eye exam: Dr[facility] Porfirio Reyes in [facility],   Lipids:  ldl 84  Covid: both + booster Oksana Farhat)    Patient Active Problem List    Diagnosis Date Noted    IFG (impaired fasting glucose) 2019    Angioedema 2016    Hyperlipidemia 2013    CAD (coronary artery disease) 10/01/2012    H/O allergy     Hypertension      Current Outpatient Medications   Medication Sig Dispense Refill    triamterene-hydroCHLOROthiazide (DYAZIDE) 37.5-25 mg per capsule TAKE 1 CAPSULE DAILY 90 Capsule 0    felodipine (PLENDIL SR) 5 mg 24 hr tablet TAKE 1 TABLET DAILY 90 Tablet 0    atorvastatin (LIPITOR) 20 mg tablet TAKE 1 TABLET DAILY 90 Tablet 1    potassium chloride (K-DUR, KLOR-CON) 20 mEq tablet Take 1 Tab by mouth daily. (Patient not taking: Reported on 2021) 30 Tab 3    Ketoconazole 2 % topical foam Apply  to affected area two (2) times a day. 100 g 0    dorzolamide-timolol (COSOPT) 22.3-6.8 mg/mL ophthalmic solution       Cholecalciferol, Vitamin D3, (VITAMIN D3) 1,000 unit cap Take  by mouth.  fexofenadine (ALLEGRA) 180 mg tablet Take 180 mg by mouth daily.  aspirin 81 mg tablet Take 81 mg by mouth daily.          Past Surgical History:   Procedure Laterality Date    COLONOSCOPY N/A 2019    COLONOSCOPY performed by Joel Arambula MD at [facility] AMBULATORY OR    HX  SECTION      HX CHOLECYSTECTOMY  14    lap mando with cholangiogram    HX HYSTERECTOMY      HX OTHER SURGICAL  14    ERCPwith biliary sphincterotomy CBD balloon sweeps      HX TUBAL LIGATION      [place] ABDOMEN SURGERY PROC UNLISTED  14 LAPAROSCOPIC CHOLECYSTECTOMY with GRAMS      Lab Results   Component Value Date/Time    WBC 7.2 06/03/2021 09:59 AM    HGB 12.1 06/03/2021 09:59 AM    HCT 39.9 06/03/2021 09:59 AM    PLATELET 155 15/28/9214 09:59 AM    MCV 74 (L) 06/03/2021 09:59 AM     Lab Results   Component Value Date/Time    Cholesterol, total 143 06/03/2021 09:59 AM    HDL Cholesterol 43 06/03/2021 09:59 AM    LDL, calculated 84 06/03/2021 09:59 AM    LDL, calculated 74 06/18/2020 09:00 AM    Triglyceride 81 06/03/2021 09:59 AM     Lab Results   Component Value Date/Time    GFR est non-AA 42 (L) 12/06/2021 08:37 AM    GFRNA, POC 48 (L) 11/30/2020 10:52 AM    GFR est AA 49 (L) 12/06/2021 08:37 AM    GFRAA, POC 59 (L) 11/30/2020 10:52 AM    Creatinine 1.24 (H) 12/06/2021 08:37 AM    Creatinine (POC) 1.1 11/30/2020 10:52 AM    BUN 21 12/06/2021 08:37 AM    Sodium 144 12/06/2021 08:37 AM    Potassium 3.9 12/06/2021 08:37 AM    Chloride 106 12/06/2021 08:37 AM    CO2 25 12/06/2021 08:37 AM        Review of Systems   Respiratory: Negative for shortness of breath. Cardiovascular: Negative for chest pain. Physical Exam  Constitutional:       General: She is not in acute distress. Appearance: Normal appearance. She is not ill-appearing, toxic-appearing or diaphoretic. HENT:      Head: Normocephalic and atraumatic. Right Ear: External ear normal.      Left Ear: External ear normal.   Eyes:      General:         Right eye: No discharge. Left eye: No discharge. Conjunctiva/sclera: Conjunctivae normal.      Pupils: Pupils are equal, round, and reactive to light. Cardiovascular:      Rate and Rhythm: Normal rate and regular rhythm. Heart sounds: No murmur heard. No friction rub. No gallop. Pulmonary:      Effort: No respiratory distress. Breath sounds: Normal breath sounds. No wheezing or rales. Chest:      Chest wall: No tenderness. Musculoskeletal:         General: Normal range of motion.       Cervical back: Normal range of motion and neck supple. Skin:     General: Skin is warm and dry. Neurological:      Mental Status: She is alert and oriented to person, place, and time. Mental status is at baseline. Coordination: Coordination normal.      Gait: Gait normal.   Psychiatric:         Mood and Affect: Mood normal.         Behavior: Behavior normal.         ASSESSMENT and PLAN    ICD-10-CM ICD-9-CM    1. Primary hypertension  I10 401.9 HEMOGLOBIN A1C WITH EAG      TSH 3RD GENERATION      METABOLIC PANEL, COMPREHENSIVE   Controlled on Dyazide amlodipine continue   CBC W/O DIFF      LIPID PANEL   2. Medicare annual wellness visit, subsequent  Z00.00 V70.0    3. IFG (impaired fasting glucose)  R73.01 790.21 HEMOGLOBIN A1C WITH EAG      TSH 3RD GENERATION   Monitor A1c is diet controlled   METABOLIC PANEL, COMPREHENSIVE      CBC W/O DIFF      LIPID PANEL   4. Pure hypercholesterolemia  E78.00 272.0 HEMOGLOBIN A1C WITH EAG      TSH 3RD GENERATION   Controlled Lipitor   METABOLIC PANEL, COMPREHENSIVE      CBC W/O DIFF      LIPID PANEL   5. Postmenopausal state     DEXA due     Z78.0 V49.81 DEXA BONE DENSITY STUDY AXIAL   6. Coronary artery disease involving native coronary artery of native heart without angina pectoris  I25.10 414.01 HEMOGLOBIN A1C WITH EAG      TSH 3RD GENERATION   Medically managed previously saw cardiology has not had symptoms in years and will only go back to cardiology if symptoms develop   METABOLIC PANEL, COMPREHENSIVE      CBC W/O DIFF      LIPID PANEL   7. Hand pain, right       Symptomatically improved we will get x-ray of the right hand    Will give diclofenac gel for mild symptoms    Though much improved from over the weekend    Possible gout flare? If recurs may benefit from steroid     M79.641 729.5 XR HAND RT MIN 3 V   8.  Essential hypertension  I10 401.9 HEMOGLOBIN A1C WITH EAG      TSH 3RD GENERATION   Controlled on current regimen of Lipitor   METABOLIC PANEL, COMPREHENSIVE CBC W/O DIFF      LIPID PANEL   9. Breast screening  Z12.39 V76.10 HEMOGLOBIN A1C WITH EAG      TSH 3RD GENERATION   Mammogram up-to-date   METABOLIC PANEL, COMPREHENSIVE      CBC W/O DIFF      LIPID PANEL   10. Kidney cyst, acquired  N28.1 593.2 HEMOGLOBIN A1C WITH EAG      TSH 3RD GENERATION   Previously saw urology was discharged from care   METABOLIC PANEL, COMPREHENSIVE      CBC W/O DIFF      LIPID PANEL      Depression screen reviewed and negative      Current diagnosis and concerns discussed with pt at length. Pt understands risks and benefits or current treatment plan and medications, and accepts the treatment and medication with any possible risks. Pt asks appropriate questions, which were answered. Pt was instructed to call with any concerns or problems. I have reviewed the note documented by the scribe. The services provided are my own.   The documentation is accurate

## 2022-06-08 ENCOUNTER — OFFICE VISIT (OUTPATIENT)
Dept: INTERNAL MEDICINE CLINIC | Age: 77
End: 2022-06-08
Payer: MEDICARE

## 2022-06-08 ENCOUNTER — HOSPITAL ENCOUNTER (OUTPATIENT)
Dept: GENERAL RADIOLOGY | Age: 77
Discharge: HOME | End: 2022-06-08
Payer: MEDICARE

## 2022-06-08 VITALS
OXYGEN SATURATION: 96 % | SYSTOLIC BLOOD PRESSURE: 129 MMHG | TEMPERATURE: 97.9 F | RESPIRATION RATE: 18 BRPM | DIASTOLIC BLOOD PRESSURE: 72 MMHG | HEIGHT: 66 IN | HEART RATE: 82 BPM | WEIGHT: 145 LBS | BODY MASS INDEX: 23.3 KG/M2

## 2022-06-08 DIAGNOSIS — M79.641 HAND PAIN, RIGHT: ICD-10-CM

## 2022-06-08 DIAGNOSIS — I25.10 CORONARY ARTERY DISEASE INVOLVING NATIVE CORONARY ARTERY OF NATIVE HEART WITHOUT ANGINA PECTORIS: ICD-10-CM

## 2022-06-08 DIAGNOSIS — Z12.39 BREAST SCREENING: ICD-10-CM

## 2022-06-08 DIAGNOSIS — R73.01 IFG (IMPAIRED FASTING GLUCOSE): ICD-10-CM

## 2022-06-08 DIAGNOSIS — Z00.00 MEDICARE ANNUAL WELLNESS VISIT, SUBSEQUENT: ICD-10-CM

## 2022-06-08 DIAGNOSIS — I10 PRIMARY HYPERTENSION: Primary | ICD-10-CM

## 2022-06-08 DIAGNOSIS — N28.1 KIDNEY CYST, ACQUIRED: ICD-10-CM

## 2022-06-08 DIAGNOSIS — Z78.0 POSTMENOPAUSAL STATE: ICD-10-CM

## 2022-06-08 DIAGNOSIS — I10 ESSENTIAL HYPERTENSION: ICD-10-CM

## 2022-06-08 DIAGNOSIS — E78.00 PURE HYPERCHOLESTEROLEMIA: ICD-10-CM

## 2022-06-08 LAB
ALBUMIN SERPL-MCNC: 4 G/DL (ref 3.5–5)
ALBUMIN/GLOB SERPL: 1 {RATIO} (ref 1.1–2.2)
ALP SERPL-CCNC: 82 U/L (ref 45–117)
ALT SERPL-CCNC: 26 U/L (ref 12–78)
ANION GAP SERPL CALC-SCNC: 7 MMOL/L (ref 5–15)
AST SERPL-CCNC: 14 U/L (ref 15–37)
BILIRUB SERPL-MCNC: 0.5 MG/DL (ref 0.2–1)
BUN SERPL-MCNC: 16 MG/DL (ref 6–20)
BUN/CREAT SERPL: 14 (ref 12–20)
CALCIUM SERPL-MCNC: 9.5 MG/DL (ref 8.5–10.1)
CHLORIDE SERPL-SCNC: 103 MMOL/L (ref 97–108)
CHOLEST SERPL-MCNC: 138 MG/DL
CO2 SERPL-SCNC: 30 MMOL/L (ref 21–32)
CREAT SERPL-MCNC: 1.11 MG/DL (ref 0.55–1.02)
ERYTHROCYTE [DISTWIDTH] IN BLOOD BY AUTOMATED COUNT: 15.9 % (ref 11.5–14.5)
EST. AVERAGE GLUCOSE BLD GHB EST-MCNC: 120 MG/DL
GLOBULIN SER CALC-MCNC: 3.9 G/DL (ref 2–4)
GLUCOSE SERPL-MCNC: 105 MG/DL (ref 65–100)
HBA1C MFR BLD: 5.8 % (ref 4–5.6)
HCT VFR BLD AUTO: 42.6 % (ref 35–47)
HDLC SERPL-MCNC: 46 MG/DL
HDLC SERPL: 3 {RATIO} (ref 0–5)
HGB BLD-MCNC: 12.9 G/DL (ref 11.5–16)
LDLC SERPL CALC-MCNC: 68.8 MG/DL (ref 0–100)
MCH RBC QN AUTO: 23.1 PG (ref 26–34)
MCHC RBC AUTO-ENTMCNC: 30.3 G/DL (ref 30–36.5)
MCV RBC AUTO: 76.3 FL (ref 80–99)
NRBC # BLD: 0 K/UL (ref 0–0.01)
NRBC BLD-RTO: 0 PER 100 WBC
PLATELET # BLD AUTO: 292 K/UL (ref 150–400)
PMV BLD AUTO: 10.8 FL (ref 8.9–12.9)
POTASSIUM SERPL-SCNC: 3 MMOL/L (ref 3.5–5.1)
PROT SERPL-MCNC: 7.9 G/DL (ref 6.4–8.2)
RBC # BLD AUTO: 5.58 M/UL (ref 3.8–5.2)
SODIUM SERPL-SCNC: 140 MMOL/L (ref 136–145)
TRIGL SERPL-MCNC: 116 MG/DL (ref ?–150)
TSH SERPL DL<=0.05 MIU/L-ACNC: 1.11 UIU/ML (ref 0.36–3.74)
VLDLC SERPL CALC-MCNC: 23.2 MG/DL
WBC # BLD AUTO: 8.4 K/UL (ref 3.6–11)

## 2022-06-08 PROCEDURE — G8427 DOCREV CUR MEDS BY ELIG CLIN: HCPCS | Performed by: INTERNAL MEDICINE

## 2022-06-08 PROCEDURE — 1090F PRES/ABSN URINE INCON ASSESS: CPT | Performed by: INTERNAL MEDICINE

## 2022-06-08 PROCEDURE — G8752 SYS BP LESS 140: HCPCS | Performed by: INTERNAL MEDICINE

## 2022-06-08 PROCEDURE — G8510 SCR DEP NEG, NO PLAN REQD: HCPCS | Performed by: INTERNAL MEDICINE

## 2022-06-08 PROCEDURE — 1101F PT FALLS ASSESS-DOCD LE1/YR: CPT | Performed by: INTERNAL MEDICINE

## 2022-06-08 PROCEDURE — G8536 NO DOC ELDER MAL SCRN: HCPCS | Performed by: INTERNAL MEDICINE

## 2022-06-08 PROCEDURE — 99214 OFFICE O/P EST MOD 30 MIN: CPT | Performed by: INTERNAL MEDICINE

## 2022-06-08 PROCEDURE — G0439 PPPS, SUBSEQ VISIT: HCPCS | Performed by: INTERNAL MEDICINE

## 2022-06-08 PROCEDURE — G8420 CALC BMI NORM PARAMETERS: HCPCS | Performed by: INTERNAL MEDICINE

## 2022-06-08 PROCEDURE — 73130 X-RAY EXAM OF HAND: CPT

## 2022-06-08 PROCEDURE — G8399 PT W/DXA RESULTS DOCUMENT: HCPCS | Performed by: INTERNAL MEDICINE

## 2022-06-08 PROCEDURE — G8754 DIAS BP LESS 90: HCPCS | Performed by: INTERNAL MEDICINE

## 2022-06-08 RX ORDER — DICLOFENAC SODIUM 10 MG/G
2 GEL TOPICAL 4 TIMES DAILY
Qty: 1 EACH | Refills: 0 | Status: SHIPPED | OUTPATIENT
Start: 2022-06-08

## 2022-06-08 NOTE — PATIENT INSTRUCTIONS
Medicare Wellness Visit, Female     The best way to live healthy is to have a lifestyle where you eat a well-balanced diet, exercise regularly, limit alcohol use, and quit all forms of tobacco/nicotine, if applicable. Regular preventive services are another way to keep healthy. Preventive services (vaccines, screening tests, monitoring & exams) can help personalize your care plan, which helps you manage your own care. Screening tests can find health problems at the earliest stages, when they are easiest to treat. [facility] follows the current, evidence-based guidelines published by the [facility] Fabian Lozada ([facility]) when recommending preventive services for our patients. Because we follow these guidelines, sometimes recommendations change over time as research supports it. (For example, mammograms used to be recommended annually. Even though Medicare will still pay for an annual mammogram, the newer guidelines recommend a mammogram every two years for women of average risk). Of course, you and your doctor may decide to screen more often for some diseases, based on your risk and your co-morbidities (chronic disease you are already diagnosed with). Preventive services for you include:  - Medicare offers their members a free annual wellness visit, which is time for you and your primary care provider to discuss and plan for your preventive service needs. Take advantage of this benefit every year!  -All adults over the age of 72 should receive the recommended pneumonia vaccines. Current USPSTF guidelines recommend a series of two vaccines for the best pneumonia protection.   -All adults should have a flu vaccine yearly and a tetanus vaccine every 10 years.   -All adults age 48 and older should receive the shingles vaccines (series of two vaccines).       -All adults age 38-68 who are overweight should have a diabetes screening test once every three years.   -All adults born between [birth year] and 1965 should be screened once for Hepatitis C.  -Other screening tests and preventive services for persons with diabetes include: an eye exam to screen for diabetic retinopathy, a kidney function test, a foot exam, and stricter control over your cholesterol.   -Cardiovascular screening for adults with routine risk involves an electrocardiogram (ECG) at intervals determined by your doctor.   -Colorectal cancer screenings should be done for adults age 54-65 with no increased risk factors for colorectal cancer. There are a number of acceptable methods of screening for this type of cancer. Each test has its own benefits and drawbacks. Discuss with your doctor what is most appropriate for you during your annual wellness visit. The different tests include: colonoscopy (considered the best screening method), a fecal occult blood test, a fecal DNA test, and sigmoidoscopy.    -A bone mass density test is recommended when a woman turns 65 to screen for osteoporosis. This test is only recommended one time, as a screening. Some providers will use this same test as a disease monitoring tool if you already have osteoporosis. -Breast cancer screenings are recommended every other year for women of normal risk, age 54-69.  -Cervical cancer screenings for women over age 72 are only recommended with certain risk factors. Here is a list of your current Health Maintenance items (your personalized list of preventive services) with a due date:  Health Maintenance Due   Topic Date Due    Cholesterol Test   06/03/2022    Annual Well Visit  06/09/2022         Medicare Wellness Visit, Female     The best way to live healthy is to have a lifestyle where you eat a well-balanced diet, exercise regularly, limit alcohol use, and quit all forms of tobacco/nicotine, if applicable. Regular preventive services are another way to keep healthy.  Preventive services (vaccines, screening tests, monitoring & exams) can help personalize your care plan, which helps you manage your own care. Screening tests can find health problems at the earliest stages, when they are easiest to treat. [facility] follows the current, evidence-based guidelines published by the [facility] Fabian Lozada ([facility]) when recommending preventive services for our patients. Because we follow these guidelines, sometimes recommendations change over time as research supports it. (For example, mammograms used to be recommended annually. Even though Medicare will still pay for an annual mammogram, the newer guidelines recommend a mammogram every two years for women of average risk). Of course, you and your doctor may decide to screen more often for some diseases, based on your risk and your co-morbidities (chronic disease you are already diagnosed with). Preventive services for you include:  - Medicare offers their members a free annual wellness visit, which is time for you and your primary care provider to discuss and plan for your preventive service needs. Take advantage of this benefit every year!  -All adults over the age of 72 should receive the recommended pneumonia vaccines. Current USPSTF guidelines recommend a series of two vaccines for the best pneumonia protection.   -All adults should have a flu vaccine yearly and a tetanus vaccine every 10 years.   -All adults age 48 and older should receive the shingles vaccines (series of two vaccines).       -All adults age 38-68 who are overweight should have a diabetes screening test once every three years.   -All adults born between 80 and 1965 should be screened once for Hepatitis C.  -Other screening tests and preventive services for persons with diabetes include: an eye exam to screen for diabetic retinopathy, a kidney function test, a foot exam, and stricter control over your cholesterol.   -Cardiovascular screening for adults with routine risk involves an electrocardiogram (ECG) at intervals determined by your doctor.   -Colorectal cancer screenings should be done for adults age 54-65 with no increased risk factors for colorectal cancer. There are a number of acceptable methods of screening for this type of cancer. Each test has its own benefits and drawbacks. Discuss with your doctor what is most appropriate for you during your annual wellness visit. The different tests include: colonoscopy (considered the best screening method), a fecal occult blood test, a fecal DNA test, and sigmoidoscopy.    -A bone mass density test is recommended when a woman turns 65 to screen for osteoporosis. This test is only recommended one time, as a screening. Some providers will use this same test as a disease monitoring tool if you already have osteoporosis. -Breast cancer screenings are recommended every other year for women of normal risk, age 54-69.  -Cervical cancer screenings for women over age 72 are only recommended with certain risk factors.      Here is a list of your current Health Maintenance items (your personalized list of preventive services) with a due date:  Health Maintenance Due   Topic Date Due    Cholesterol Test   06/03/2022

## 2022-06-08 NOTE — PROGRESS NOTES
Identified pt with two pt identifiers(name and ). Reviewed record in preparation for visit and have obtained necessary documentation. Chief Complaint   Patient presents with   24 [facility] Annual Wellness Visit        Visit Vitals  /72 (BP 1 Location: Left upper arm, BP Patient Position: Sitting, BP Cuff Size: Adult)   Pulse 82   Temp 97.9 °F (36.6 °C) (Temporal)   Resp 18   Ht 5' 6\" (1.676 m)   Wt 145 lb (65.8 kg)   SpO2 96%   BMI 23.40 kg/m²       Health Maintenance Due   Topic    Lipid Screen         1. \"Have you been to the ER, urgent care clinic since your last visit? Hospitalized since your last visit? \" No    2. \"Have you seen or consulted any other health care providers outside of the [address] [place] [address] since your last visit? \" No     3. For patients aged 39-70: Has the patient had a colonoscopy / FIT/ Cologuard? NA - based on age      If the patient is female:    4. For patients aged 41-77: Has the patient had a mammogram within the past 2 years? NA - based on age or sex      11. For patients aged 21-65: Has the patient had a pap smear? NA - based on age or sex     Med Reconciliation: Completed        Patient is accompanied by self I have received verbal consent from Danielle Cavazos to discuss any/all medical information while they are present in the room.

## 2022-06-08 NOTE — PROGRESS NOTES
This is the Subsequent Medicare Annual Wellness Exam, performed 12 months or more after the Initial AWV or the last Subsequent AWV    I have reviewed the patient's medical history in detail and updated the computerized patient record. Assessment/Plan   Education and counseling provided:  Are appropriate based on today's review and evaluation    1. Medicare annual wellness visit, subsequent       Depression Risk Factor Screening     3 most recent PHQ Screens 6/8/2022   Little interest or pleasure in doing things Not at all   Feeling down, depressed, irritable, or hopeless Not at all   Total Score PHQ 2 0       Alcohol & Drug Abuse Risk Screen    Do you average more than 1 drink per night or more than 7 drinks a week:  No    On any one occasion in the past three months have you have had more than 3 drinks containing alcohol:  No          Functional Ability and Level of Safety    Hearing: Hearing is good. Activities of Daily Living: The home contains: handrails and grab bars  Patient does total self care      Ambulation: with no difficulty     Fall Risk:  Fall Risk Assessment, last 12 mths 6/8/2022   Able to walk? Yes   Fall in past 12 months? 0   Do you feel unsteady?  0   Are you worried about falling 0      Abuse Screen:  Patient is not abused   live w     Cognitive Screening    Has your family/caregiver stated any concerns about your memory: no     Cognitive Screening: Normal - Mini Cog Test       No memory concerns   Pt knows the month, day and year   Can recall 3/3 objects     Health Maintenance Due     Health Maintenance Due   Topic Date Due    Lipid Screen  06/03/2022    Medicare Yearly Exam  06/09/2022       Patient Care Team   Patient Care Team:  Robin Joseph MD as PCP - General (Internal Medicine Physician)  Robin Joseph MD as PCP - [address] [place] [address] [facility]  Shelia Howard MD as Consulting Provider (Obstetrics & Gynecology)  Nickolas Foster MD (Gastroenterology)  Debora Tapia Eric  (Ophthalmology)  Armando Salgado MD (Urology)  Mikey Cam MD (Inactive) (Otolaryngology)    History     Patient Active Problem List   Diagnosis Code    H/O allergy Z80.7    Hypertension I10    CAD (coronary artery disease) I25.10    Hyperlipidemia E78.5    Angioedema T78. 3XXA    IFG (impaired fasting glucose) R73.01     Past Medical History:   Diagnosis Date    Dyspepsia and other specified disorders of function of stomach     GERD (gastroesophageal reflux disease)     H/O allergy     Hypercholesterolemia     Hypertension     Indigestion     Other ill-defined conditions(799.89)     elevated cholesterol      Past Surgical History:   Procedure Laterality Date    COLONOSCOPY N/A 2019    COL[facility] AM[facility]UL[facility]    HX  SECTION      HX CHOLECYSTECTOMY  14    lap mando with cholangiogram    HX HYSTERECTOMY      HX OTHER SURGICAL  14    ERCPwith biliary sphincterotomy CBD balloon sweeps      HX TUBAL LIGATION      [place] ABDOMEN SURGERY PROC UNLISTED  14    LAPAROSCOPIC CHOLECYSTECTOMY with GRAMS     Current Outpatient Medications   Medication Sig Dispense Refill    triamterene-hydroCHLOROthiazide (DYAZIDE) 37.5-25 mg per capsule TAKE 1 CAPSULE DAILY 90 Capsule 0    felodipine (PLENDIL SR) 5 mg 24 hr tablet TAKE 1 TABLET DAILY 90 Tablet 0    atorvastatin (LIPITOR) 20 mg tablet TAKE 1 TABLET DAILY 90 Tablet 1    potassium chloride (K-DUR, KLOR-CON) 20 mEq tablet Take 1 Tab by mouth daily. (Patient not taking: Reported on 2021) 30 Tab 3    Ketoconazole 2 % topical foam Apply  to affected area two (2) times a day. 100 g 0    dorzolamide-timolol (COSOPT) 22.3-6.8 mg/mL ophthalmic solution       Cholecalciferol, Vitamin D3, (VITAMIN D3) 1,000 unit cap Take  by mouth.  fexofenadine (ALLEGRA) 180 mg tablet Take 180 mg by mouth daily.  aspirin 81 mg tablet Take 81 mg by mouth daily.          No Known Allergies    Family History   Problem Relation Age of Onset    Hypertension Mother     Cancer Brother         prostate     Social History     Tobacco Use    Smoking status: Never Smoker    Smokeless tobacco: Never Used   Substance Use Topics    Alcohol use: No     ACP on file.  SDM is her  Eb Edwards.          Colonoscopy: 12/19 Dr. Kacy Carmona, repeat 5 years,  severe diverticulosis  AAA: CT abd 10/18, negative  Pap: Dr. Alejandro Franco, 7/15, [place] other year, hysterectomy and BSO in 1994, will only f/u prn  Mammogram: 1/08/22 negative annual   DEXA: 7/7/20, osteopenia --due    Tdap: 4/08/2015  Pneumovax: 9/02/2014  [id number]: 11/03/2016  Zostavax: 2010    Shingrix: both  Flu shot: 10/15/21    Eye exam: Chelsey CURRIE, 9/21 annual     A1c:  6/21 5.6 due  Lipids: 6/21 ldl 84due      [facility]    Medication reconciliation completed by MA and reviewed by me. Medical/surgical/social/family history reviewed and updated by me. Patient provided AVS and preventative screening table. Patient verbalized understanding of all information discussed.       Danica Tejada MD

## 2022-06-09 DIAGNOSIS — E87.6 LOW BLOOD POTASSIUM: Primary | ICD-10-CM

## 2022-06-09 RX ORDER — POTASSIUM CHLORIDE 20 MEQ/1
20 TABLET, EXTENDED RELEASE ORAL DAILY
Qty: 90 TABLET | Refills: 0 | Status: SHIPPED | OUTPATIENT
Start: 2022-06-09 | End: 2022-09-03

## 2022-06-09 NOTE — PROGRESS NOTES
Called, spoke to pt  Received two pt identifiers  Pt informed per Dr. Matteo Rayo potassium is low  Pt informed per Dr. Matteo Rayo restart klor con 20meq daily--pended  Pt informed per Dr. Matteo Rayo repeat BMP in  3-4 weeks---ordered  Rest labs are okay   Pt verbalizes understanding of the instructions and has no further questions at this time.

## 2022-06-09 NOTE — PROGRESS NOTES
Please call patient back about results  K level low  Restart klor con 20meq daily   Repeat bmp in 3-4 weeks   Rest fine/stable

## 2022-06-09 NOTE — TELEPHONE ENCOUNTER
Future Appointments:  Future Appointments   Date Time Provider Rabia Bullard   12/7/2022  8:45 AM Juan Daniel Candelario MD [facility] BS AMB        Last Appointment With Me:  6/8/2022     Requested Prescriptions     Pending Prescriptions Disp Refills    potassium chloride (K-DUR, KLOR-CON M20) 20 mEq tablet       Sig: Take 1 Tablet by mouth daily.

## 2022-06-13 ENCOUNTER — TELEPHONE (OUTPATIENT)
Dept: INTERNAL MEDICINE CLINIC | Age: 77
End: 2022-06-13

## 2022-06-13 NOTE — TELEPHONE ENCOUNTER
Patient states she needs a call back to get her results from X-Ray done on Wed., 6/8/22. Please call to discuss & advise.  Thank you

## 2022-06-13 NOTE — TELEPHONE ENCOUNTER
Called, spoke to pt  Received two pt identifiers  Informed pt of xray results and letter sent in the mail  Pt verbalizes understanding of the instructions and has no further questions at this time.

## 2022-06-21 ENCOUNTER — HOSPITAL ENCOUNTER (OUTPATIENT)
Dept: MAMMOGRAPHY | Age: 77
Discharge: HOME | End: 2022-06-21
Attending: INTERNAL MEDICINE
Payer: MEDICARE

## 2022-06-21 DIAGNOSIS — Z78.0 POSTMENOPAUSAL STATE: ICD-10-CM

## 2022-06-21 PROCEDURE — 77080 DXA BONE DENSITY AXIAL: CPT

## 2022-08-02 DIAGNOSIS — I10 ESSENTIAL HYPERTENSION: ICD-10-CM

## 2022-08-02 RX ORDER — TRIAMTERENE AND HYDROCHLOROTHIAZIDE 37.5; 25 MG/1; MG/1
CAPSULE ORAL
Qty: 90 CAPSULE | Refills: 0 | Status: SHIPPED | OUTPATIENT
Start: 2022-08-02 | End: 2022-10-31

## 2022-08-02 RX ORDER — FELODIPINE 5 MG/1
TABLET, EXTENDED RELEASE ORAL
Qty: 90 TABLET | Refills: 0 | Status: SHIPPED | OUTPATIENT
Start: 2022-08-02 | End: 2022-10-31

## 2022-09-03 RX ORDER — POTASSIUM CHLORIDE 1500 MG/1
TABLET, EXTENDED RELEASE ORAL
Qty: 90 TABLET | Refills: 0 | Status: SHIPPED | OUTPATIENT
Start: 2022-09-03 | End: 2022-11-04

## 2022-10-12 ENCOUNTER — APPOINTMENT (OUTPATIENT)
Dept: GENERAL RADIOLOGY | Age: 77
End: 2022-10-12
Attending: STUDENT IN AN ORGANIZED HEALTH CARE EDUCATION/TRAINING PROGRAM
Payer: MEDICARE

## 2022-10-12 ENCOUNTER — HOSPITAL ENCOUNTER (EMERGENCY)
Age: 77
Discharge: HOME | End: 2022-10-12
Attending: STUDENT IN AN ORGANIZED HEALTH CARE EDUCATION/TRAINING PROGRAM
Payer: MEDICARE

## 2022-10-12 VITALS
DIASTOLIC BLOOD PRESSURE: 70 MMHG | OXYGEN SATURATION: 98 % | BODY MASS INDEX: 24.09 KG/M2 | TEMPERATURE: 98.2 F | SYSTOLIC BLOOD PRESSURE: 113 MMHG | HEIGHT: 66 IN | RESPIRATION RATE: 11 BRPM | HEART RATE: 70 BPM | WEIGHT: 149.91 LBS

## 2022-10-12 DIAGNOSIS — R07.89 CHEST WALL PAIN: Primary | ICD-10-CM

## 2022-10-12 LAB
ALBUMIN SERPL-MCNC: 3.9 G/DL (ref 3.5–5)
ALBUMIN/GLOB SERPL: 0.9 {RATIO} (ref 1.1–2.2)
ALP SERPL-CCNC: 85 U/L (ref 45–117)
ALT SERPL-CCNC: 27 U/L (ref 12–78)
ANION GAP SERPL CALC-SCNC: 4 MMOL/L (ref 5–15)
AST SERPL-CCNC: 16 U/L (ref 15–37)
ATRIAL RATE: 96 BPM
BASOPHILS # BLD: 0.1 K/UL (ref 0–0.1)
BASOPHILS NFR BLD: 1 % (ref 0–1)
BILIRUB SERPL-MCNC: 0.2 MG/DL (ref 0.2–1)
BNP SERPL-MCNC: 28 PG/ML
BUN SERPL-MCNC: 20 MG/DL (ref 6–20)
BUN/CREAT SERPL: 16 (ref 12–20)
CALCIUM SERPL-MCNC: 9.4 MG/DL (ref 8.5–10.1)
CALCULATED P AXIS, ECG09: 58 DEGREES
CALCULATED R AXIS, ECG10: 24 DEGREES
CALCULATED T AXIS, ECG11: 46 DEGREES
CHLORIDE SERPL-SCNC: 106 MMOL/L (ref 97–108)
CO2 SERPL-SCNC: 30 MMOL/L (ref 21–32)
CREAT SERPL-MCNC: 1.26 MG/DL (ref 0.55–1.02)
DIAGNOSIS, 93000: NORMAL
DIFFERENTIAL METHOD BLD: ABNORMAL
EOSINOPHIL # BLD: 0.6 K/UL (ref 0–0.4)
EOSINOPHIL NFR BLD: 7 % (ref 0–7)
ERYTHROCYTE [DISTWIDTH] IN BLOOD BY AUTOMATED COUNT: 15.7 % (ref 11.5–14.5)
GLOBULIN SER CALC-MCNC: 4.2 G/DL (ref 2–4)
GLUCOSE SERPL-MCNC: 98 MG/DL (ref 65–100)
HCT VFR BLD AUTO: 40.3 % (ref 35–47)
HGB BLD-MCNC: 12.5 G/DL (ref 11.5–16)
IMM GRANULOCYTES # BLD AUTO: 0 K/UL (ref 0–0.04)
IMM GRANULOCYTES NFR BLD AUTO: 0 % (ref 0–0.5)
LYMPHOCYTES # BLD: 2.1 K/UL (ref 0.8–3.5)
LYMPHOCYTES NFR BLD: 22 % (ref 12–49)
MCH RBC QN AUTO: 22.8 PG (ref 26–34)
MCHC RBC AUTO-ENTMCNC: 31 G/DL (ref 30–36.5)
MCV RBC AUTO: 73.5 FL (ref 80–99)
MONOCYTES # BLD: 0.6 K/UL (ref 0–1)
MONOCYTES NFR BLD: 6 % (ref 5–13)
NEUTS SEG # BLD: 6.2 K/UL (ref 1.8–8)
NEUTS SEG NFR BLD: 64 % (ref 32–75)
NRBC # BLD: 0 K/UL (ref 0–0.01)
NRBC BLD-RTO: 0 PER 100 WBC
P-R INTERVAL, ECG05: 176 MS
PLATELET # BLD AUTO: 296 K/UL (ref 150–400)
PMV BLD AUTO: 9.5 FL (ref 8.9–12.9)
POTASSIUM SERPL-SCNC: 3.6 MMOL/L (ref 3.5–5.1)
PROT SERPL-MCNC: 8.1 G/DL (ref 6.4–8.2)
Q-T INTERVAL, ECG07: 364 MS
QRS DURATION, ECG06: 66 MS
QTC CALCULATION (BEZET), ECG08: 459 MS
RBC # BLD AUTO: 5.48 M/UL (ref 3.8–5.2)
SODIUM SERPL-SCNC: 140 MMOL/L (ref 136–145)
TROPONIN-HIGH SENSITIVITY: 5 NG/L (ref 0–51)
VENTRICULAR RATE, ECG03: 96 BPM
WBC # BLD AUTO: 9.7 K/UL (ref 3.6–11)

## 2022-10-12 PROCEDURE — 93005 ELECTROCARDIOGRAM TRACING: CPT

## 2022-10-12 PROCEDURE — 83880 ASSAY OF NATRIURETIC PEPTIDE: CPT

## 2022-10-12 PROCEDURE — 80053 COMPREHEN METABOLIC PANEL: CPT

## 2022-10-12 PROCEDURE — 99285 EMERGENCY DEPT VISIT HI MDM: CPT

## 2022-10-12 PROCEDURE — 36415 COLL VENOUS BLD VENIPUNCTURE: CPT

## 2022-10-12 PROCEDURE — 71046 X-RAY EXAM CHEST 2 VIEWS: CPT

## 2022-10-12 PROCEDURE — 84484 ASSAY OF TROPONIN QUANT: CPT

## 2022-10-12 PROCEDURE — 85025 COMPLETE CBC W/AUTO DIFF WBC: CPT

## 2022-10-12 NOTE — ED PROVIDER NOTES
EMERGENCY DEPARTMENT HISTORY AND PHYSICAL EXAM      Date: 10/12/2022  Patient Name: Opal Judd    History of Presenting Illness     Chief Complaint   Patient presents with    Chest Pain     Reports new onset of sharp left sided chest pain starting this morning. Denied SOB, nausea, blurred vision, or dizziness. Denied any significant cardiac hx. History Provided By: Patient    HPI: Opal Judd, 67 y.o. female with a past medical history significant for htn, hld presents to the ED with cc of chest pain. She notes the pain started this morning. It was a 9 out of 10 when she got to the emergency department but is gotten much better since then. Has not taken anything for her pain. She notes her pain is worse when she sits up or moves her arms or touches the area. She notes it is over her left upper chest and left breast.  She has gotten this before but much less in intensity. Denies any trauma or overexertion. Denies any associated symptoms including shortness of breath, dizziness, fever, cough, sore throat, nausea, vomiting or abdominal pain. Denies radiation of the pain. Notes it did not wake her from sleep but she did notice it when she woke up. She did go for a walk this morning as well. Note she has been eating and drinking normally today. Denies any dysuria, constipation or diarrhea. She does not smoke. She drinks occasionally and denies any illicit drug use. Family history is negative for any cardiac problems    There are no associated symptoms. No other exacerbating or ameliorating factors. PCP: Nick Felton MD    No current facility-administered medications on file prior to encounter.      Current Outpatient Medications on File Prior to Encounter   Medication Sig Dispense Refill    triamterene-hydroCHLOROthiazide (DYAZIDE) 37.5-25 mg per capsule TAKE 1 CAPSULE DAILY 90 Capsule 0    felodipine (PLENDIL SR) 5 mg 24 hr tablet TAKE 1 TABLET DAILY 90 Tablet 0    atorvastatin (LIPITOR) 20 mg tablet TAKE 1 TABLET DAILY 90 Tablet 1    dorzolamide-timolol (COSOPT) 22.3-6.8 mg/mL ophthalmic solution       cholecalciferol (VITAMIN D3) 25 mcg (1,000 unit) cap Take  by mouth. fexofenadine (ALLEGRA) 180 mg tablet Take 180 mg by mouth daily. aspirin 81 mg tablet Take 81 mg by mouth daily. Klor-Con M20 20 mEq tablet TAKE 1 TABLET BY MOUTH EVERY DAY 90 Tablet 0    diclofenac (VOLTAREN) 1 % gel Apply 2 g to affected area four (4) times daily. prn (Patient not taking: Reported on 10/12/2022) 1 Each 0    Ketoconazole 2 % topical foam Apply  to affected area two (2) times a day. (Patient not taking: Reported on 10/12/2022) 100 g 0       Past History     Past Medical History:  Past Medical History:   Diagnosis Date    Dyspepsia and other specified disorders of function of stomach     GERD (gastroesophageal reflux disease)     H/O allergy     Hypercholesterolemia     Hypertension     Indigestion     Other ill-defined conditions(799.89)     elevated cholesterol       Past Surgical History:  Past Surgical History:   Procedure Laterality Date    COLONOSCOPY N/A 2019    COLONOSCOPY performed by Rod Loera MD at [facility] AM[facility] OR    HX  SECTION      HX CHOLECYSTECTOMY  14    lap mando with cholangiogram    HX HYSTERECTOMY      HX OTHER SURGICAL  14    ERCPwith biliary sphincterotomy CBD balloon sweeps      HX TUBAL LIGATION      ME ABDOMEN SURGERY PROC UNLISTED  14    LAPAROSCOPIC CHOLECYSTECTOMY with GRAMS       Family History:  Family History   Problem Relation Age of Onset    Hypertension Mother     Cancer Brother         prostate       Social History:  Social History     Tobacco Use    Smoking status: Never    Smokeless tobacco: Never   Substance Use Topics    Alcohol use: No    Drug use: No       Allergies:  No Known Allergies      Review of Systems   Review of Systems   Constitutional:  Negative for activity change, chills and fever.    HENT: Negative for congestion and sore throat. Eyes:  Negative for visual disturbance. Respiratory:  Negative for cough and shortness of breath. Cardiovascular:  Positive for chest pain. Gastrointestinal:  Negative for abdominal pain, blood in stool, constipation, diarrhea, nausea and vomiting. Genitourinary:  Negative for dysuria. Musculoskeletal:  Negative for myalgias. Skin:  Negative for rash. Neurological:  Negative for dizziness and headaches. Physical Exam   Physical Exam  Constitutional:       Appearance: Normal appearance. HENT:      Head: Normocephalic and atraumatic. Nose: Nose normal.      Mouth/Throat:      Mouth: Mucous membranes are moist.   Eyes:      Extraocular Movements: Extraocular movements intact. Conjunctiva/sclera: Conjunctivae normal.      Pupils: Pupils are equal, round, and reactive to light. Cardiovascular:      Rate and Rhythm: Normal rate and regular rhythm. Heart sounds: Normal heart sounds. Heart sounds not distant. No friction rub. Pulmonary:      Effort: Pulmonary effort is normal.      Breath sounds: Normal breath sounds. Chest:      Chest wall: Tenderness present. No mass, deformity or crepitus. Abdominal:      General: Abdomen is flat. Palpations: Abdomen is soft. Musculoskeletal:         General: No swelling or deformity. Normal range of motion. Cervical back: Normal range of motion. Skin:     General: Skin is warm. Neurological:      General: No focal deficit present. Mental Status: She is alert and oriented to person, place, and time. Mental status is at baseline. Psychiatric:         Mood and Affect: Mood normal.         Thought Content:  Thought content normal.       Diagnostic Study Results     Labs -     Recent Results (from the past 24 hour(s))   EKG, 12 LEAD, INITIAL    Collection Time: 10/12/22  1:26 PM   Result Value Ref Range    Ventricular Rate 96 BPM    Atrial Rate 96 BPM    P-R Interval 176 ms QRS Duration 66 ms    Q-T Interval 364 ms    QTC Calculation (Bezet) 459 ms    Calculated P Axis 58 degrees    Calculated R Axis 24 degrees    Calculated T Axis 46 degrees    Diagnosis       Normal sinus rhythm  Possible Left atrial enlargement  When compared with ECG of 17-APR-2014 12:59,  No significant change was found     CBC WITH AUTOMATED DIFF    Collection Time: 10/12/22  1:32 PM   Result Value Ref Range    WBC 9.7 3.6 - 11.0 K/uL    RBC 5.48 (H) 3.80 - 5.20 M/uL    HGB 12.5 11.5 - 16.0 g/dL    HCT 40.3 35.0 - 47.0 %    MCV 73.5 (L) 80.0 - 99.0 FL    MCH 22.8 (L) 26.0 - 34.0 PG    MCHC 31.0 30.0 - 36.5 g/dL    RDW 15.7 (H) 11.5 - 14.5 %    PLATELET 925 009 - 528 K/uL    MPV 9.5 8.9 - 12.9 FL    NRBC 0.0 0  WBC    ABSOLUTE NRBC 0.00 0.00 - 0.01 K/uL    NEUTROPHILS 64 32 - 75 %    LYMPHOCYTES 22 12 - 49 %    MONOCYTES 6 5 - 13 %    EOSINOPHILS 7 0 - 7 %    BASOPHILS 1 0 - 1 %    IMMATURE GRANULOCYTES 0 0.0 - 0.5 %    ABS. NEUTROPHILS 6.2 1.8 - 8.0 K/UL    ABS. LYMPHOCYTES 2.1 0.8 - 3.5 K/UL    ABS. MONOCYTES 0.6 0.0 - 1.0 K/UL    ABS. EOSINOPHILS 0.6 (H) 0.0 - 0.4 K/UL    ABS. BASOPHILS 0.1 0.0 - 0.1 K/UL    ABS. IMM. GRANS. 0.0 0.00 - 0.04 K/UL    DF AUTOMATED     METABOLIC PANEL, COMPREHENSIVE    Collection Time: 10/12/22  1:32 PM   Result Value Ref Range    Sodium 140 136 - 145 mmol/L    Potassium 3.6 3.5 - 5.1 mmol/L    Chloride 106 97 - 108 mmol/L    CO2 30 21 - 32 mmol/L    Anion gap 4 (L) 5 - 15 mmol/L    Glucose 98 65 - 100 mg/dL    BUN 20 6 - 20 MG/DL    Creatinine 1.26 (H) 0.55 - 1.02 MG/DL    BUN/Creatinine ratio 16 12 - 20      eGFR 44 (L) >60 ml/min/1.73m2    Calcium 9.4 8.5 - 10.1 MG/DL    Bilirubin, total 0.2 0.2 - 1.0 MG/DL    ALT (SGPT) 27 12 - 78 U/L    AST (SGOT) 16 15 - 37 U/L    Alk.  phosphatase 85 45 - 117 U/L    Protein, total 8.1 6.4 - 8.2 g/dL    Albumin 3.9 3.5 - 5.0 g/dL    Globulin 4.2 (H) 2.0 - 4.0 g/dL    A-G Ratio 0.9 (L) 1.1 - 2.2     NT-PRO BNP    Collection Time: 10/12/22  1:32 PM   Result Value Ref Range    NT pro-BNP 28 <450 PG/ML   TROPONIN-HIGH SENSITIVITY    Collection Time: 10/12/22  1:32 PM   Result Value Ref Range    Troponin-High Sensitivity 5 0 - 51 ng/L       Radiologic Studies -   XR CHEST PA LAT   Final Result   No acute cardiopulmonary process. CT Results  (Last 48 hours)      None          CXR Results  (Last 48 hours)                 10/12/22 1354  XR CHEST PA LAT Final result    Impression:  No acute cardiopulmonary process. Narrative:  EXAM:  XR CHEST PA LAT       INDICATION:   Chest Pain       COMPARISON: 2/7/2018 chest radiographs. FINDINGS: PA and lateral radiographs of the chest were obtained. No evidence of focal consolidation. Incidental notice of azygous fissure in the   right upper lobe, developmental variant. No pleural effusion or pneumothorax. Heart, audelia, mediastinum are within normal limits. No acute osseous   abnormalities. Medical Decision Making   I am the first provider for this patient. I reviewed the vital signs, available nursing notes, past medical history, past surgical history, family history and social history. Vital Signs-Reviewed the patient's vital signs. Patient Vitals for the past 12 hrs:   Temp Pulse Resp BP SpO2   10/12/22 1502 -- 70 13 113/71 96 %   10/12/22 1452 -- -- -- -- 95 %   10/12/22 1452 -- 84 17 114/75 98 %   10/12/22 1322 98.2 °F (36.8 °C) 68 18 (!) 147/68 99 %       Records Reviewed: Nursing records and medical records reviewed    MDM:  DDx includes ACS, MI, angina, unstable angina, PE, pneumonia, pneumothorax    Provider Notes (Medical Decision Making):   65-year-old female with history hypertension hyperlipidemia presents with chest pain. Her vital signs are stable upon arrival.  She appears well and is in no acute distress. She has mild tenderness to palpation over the left upper chest and left breast.  No signs of trauma.   Lab work negative for any acute abnormalities and troponin and BNP are negative. Given the location of her pain in the modifying factors including worse with sitting up and moving at all it is less of cardiac or pulmonary nature. She has no other associated symptoms with this. She is a very low heart score however she has had this pain before. She would likely benefit from a stress test on outpatient basis. Given her symptoms started early this morning no indication for repeat troponin at this time. We will wait for chest x-ray to be read for any focal lesions or pathology. If negative will discharge with cardiology follow-up. PE not likely as she has no risk factors. ED Course:   Initial assessment performed. The patients presenting problems have been discussed, and they are in agreement with the care plan formulated and outlined with them. I have encouraged them to ask questions as they arise throughout their visit. ED Course as of 10/12/22 1601   Wed Oct 12, 2022   1600 Cxr normal, ok for DC [JS]      ED Course User Index  [JS] Paul Chong MD         Disposition:  Discharge Note:  4:01 PM  The patient has been re-evaluated and is ready for discharge. Reviewed available results with patient. Counseled patient on diagnosis and care plan. Patient has expressed understanding, and all questions have been answered. Patient agrees with plan and agrees to follow up as recommended, or to return to the ED if their symptoms worsen. Discharge instructions have been provided and explained to the patient, along with reasons to return to the ED. DISCHARGE PLAN:  1. Current Discharge Medication List        2.    Follow-up Information       Follow up With Specialties Details Why Contact Info    Meri Zimmer MD Internal Medicine Physician In 1 week  [address] [facility]  [phone]      [facility] EMERGENCY DEPT Emergency Medicine  If symptoms worsen [address] [facility] [address] [place] [address] Drive  [phone]          3. Return to ED if worse     Diagnosis     Clinical Impression:   1. Chest wall pain        Attestations:    Mukesh Larson MD    Please note that this dictation was completed with [employer], the computer voice recognition software. Quite often unanticipated grammatical, syntax, homophones, and other interpretive errors are inadvertently transcribed by the computer software. Please disregard these errors. Please excuse any errors that have escaped final proofreading. Thank you.

## 2022-10-13 ENCOUNTER — TELEPHONE (OUTPATIENT)
Dept: INTERNAL MEDICINE CLINIC | Age: 77
End: 2022-10-13

## 2022-10-13 NOTE — TELEPHONE ENCOUNTER
Called, spoke to pt  Received two pt identifiers  Pt offered and accepted virtual appt for 10/14/22 @ 8am  Pt verbalizes understanding of the instructions and has no further questions at this time.

## 2022-10-14 ENCOUNTER — VIRTUAL VISIT (OUTPATIENT)
Dept: INTERNAL MEDICINE CLINIC | Age: 77
End: 2022-10-14
Payer: MEDICARE

## 2022-10-14 DIAGNOSIS — I10 PRIMARY HYPERTENSION: Primary | ICD-10-CM

## 2022-10-14 DIAGNOSIS — R07.89 ATYPICAL CHEST PAIN: ICD-10-CM

## 2022-10-14 DIAGNOSIS — E78.00 PURE HYPERCHOLESTEROLEMIA: ICD-10-CM

## 2022-10-14 DIAGNOSIS — E87.6 HYPOKALEMIA: ICD-10-CM

## 2022-10-14 DIAGNOSIS — R73.01 IFG (IMPAIRED FASTING GLUCOSE): ICD-10-CM

## 2022-10-14 DIAGNOSIS — I25.10 CORONARY ARTERY DISEASE INVOLVING NATIVE CORONARY ARTERY OF NATIVE HEART WITHOUT ANGINA PECTORIS: ICD-10-CM

## 2022-10-14 PROCEDURE — G8427 DOCREV CUR MEDS BY ELIG CLIN: HCPCS | Performed by: INTERNAL MEDICINE

## 2022-10-14 PROCEDURE — G8510 SCR DEP NEG, NO PLAN REQD: HCPCS | Performed by: INTERNAL MEDICINE

## 2022-10-14 PROCEDURE — 99214 OFFICE O/P EST MOD 30 MIN: CPT | Performed by: INTERNAL MEDICINE

## 2022-10-14 PROCEDURE — 1090F PRES/ABSN URINE INCON ASSESS: CPT | Performed by: INTERNAL MEDICINE

## 2022-10-14 PROCEDURE — G8399 PT W/DXA RESULTS DOCUMENT: HCPCS | Performed by: INTERNAL MEDICINE

## 2022-10-14 PROCEDURE — G8756 NO BP MEASURE DOC: HCPCS | Performed by: INTERNAL MEDICINE

## 2022-10-14 PROCEDURE — 1101F PT FALLS ASSESS-DOCD LE1/YR: CPT | Performed by: INTERNAL MEDICINE

## 2022-10-14 RX ORDER — PANTOPRAZOLE SODIUM 20 MG/1
20 TABLET, DELAYED RELEASE ORAL DAILY
Qty: 30 TABLET | Refills: 0 | Status: SHIPPED | OUTPATIENT
Start: 2022-10-14

## 2022-10-14 NOTE — PROGRESS NOTES
HISTORY OF PRESENT ILLNESS  Buddy Lu is a 67 y.o. female. HPI  Last here 6/8/22. Pt is here for follow up on chest pain. This is an established visit completed with telemedicine was completed with video assist  the patient acknowledges and agrees to this method of visitation     Pt presented to the ER 10/12/22 w/ chest pain. She states it began that morning as a slight L-sided pain over her breast while walking. She stopped walking, went home and laid down but it got worse, so she went to the ER. Notes she was burping some while this was happening. Reviewed CXR 10/12/22:  Final Result   No acute cardiopulmonary process. They ran labs, which were nl. Notes CP mostly subsided by the end of the day. She has some pain today but not as much as yesterday. Reports she does not feel anything if she is completely still and pain is reproducible on chest palpation. She took Tylenol and ibuprofen. Denies nausea, vomiting, fever, chills, association with food. Denies radiation into shoulder or back. Denies any heaving lifting prior to this episode. Denies CP routinely while walking or exerting herself. 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 However, pt has a hx of heart disease (no stents, see below), so ordered stress test and [facility]. Discussed avoiding lifting anything > 10 lbs for next 2 weeks and taking ibuprofen sparingly. Also recommended protonix daily for 4-6 weeks and then PRN to rule out any GI causes. Of note, she stopped taking her Klor-Con which I started last office visit due to hypokalemia noting they were too big to swallow.   Repeat labs in the ER showed adequate potassium supplementation she will increase her potassium in foods and take a multivitamin    Has history of hypertension  BP today per pt is 116/76  Continues on dyazide 37.5-25mg and felodipine 5mg daily     Wt lov 149 lbs  Her weight is within normal ranges     Reviewed labs   Will order labs today      Lov pt c/o hand pain  Reivewed XR R hand 6/8/22:  Impression:     No acute abnormality. She saw Dr. Shahid Shaw (GI) for constipation   She was given colace      Pt is taking ASA 81mg daily  Continues on lipitor 20mg daily for cholesterol     No longer using protonix, will restart today     Pt failed trazadone    Gave elavil 10mg prn for sleep, has not been using it  Having trouble sleeping still work on sleep hygiene     Not having a lot of allergy issues      She saw Dr. David Burnham (Eliane Spatz) for kidney cyst  Lov 12/19/19 --discussed complicated cyst follow-up as needed stable for the past     Saw Dr. Radha Lowe (ENT)  in the past for mass R neck, no longer needs to follow with this physician      Continues on vit D OTC 1000U daily      No longer taking klor con       ACP on file. SDM is her  Meghana Youngblood). Recall saw cardiologist in past for CP. No blockage was found. No recurrent CP.       Pt lives with      Reviewed DEXA 6/21/22: osteopenia     PREVENTIVE:    Colonoscopy: 12/19 Dr. Shahid Shaw, repeat 5 years,  severe diverticulosis  AAA: CT abd 10/18, negative  Pap: Dr. Adelaida García, 7/15,  every other year, hysterectomy and BSO in 1994, will only f/u prn  Mammogram: 1/08/22 negative  DEXA: 6/21/22, osteopenia  Tdap: 4/08/2015  Pneumovax: 9/02/2014  Jrlpsxi79: 11/03/2016  Zostavax: 2010    Shingrix: both  Flu shot: 10/15/21 this is due  A1c: 6/21 5.6, 12/21 5.6 6/22 5.8  Eye exam: Dr. Jared Leavitt in [place], 9/22  Lipids: 6/22 LDL 68  Covid: both + booster (Claudeen Fermo)    Patient Active Problem List    Diagnosis Date Noted    IFG (impaired fasting glucose) 11/05/2019    Angioedema 08/25/2016    Hyperlipidemia 04/01/2013    CAD (coronary artery disease) 10/01/2012    H/O allergy     Hypertension      Current Outpatient Medications   Medication Sig Dispense Refill    Klor-Con M20 20 mEq tablet TAKE 1 TABLET BY MOUTH EVERY DAY 90 Tablet 0    triamterene-hydroCHLOROthiazide (DYAZIDE) 37.5-25 mg per capsule TAKE 1 CAPSULE DAILY 90 Capsule 0    felodipine (PLENDIL SR) 5 mg 24 hr tablet TAKE 1 TABLET DAILY 90 Tablet 0    diclofenac (VOLTAREN) 1 % gel Apply 2 g to affected area four (4) times daily. prn (Patient not taking: Reported on 10/12/2022) 1 Each 0    atorvastatin (LIPITOR) 20 mg tablet TAKE 1 TABLET DAILY 90 Tablet 1    Ketoconazole 2 % topical foam Apply  to affected area two (2) times a day. (Patient not taking: Reported on 10/12/2022) 100 g 0    dorzolamide-timolol (COSOPT) 22.3-6.8 mg/mL ophthalmic solution       cholecalciferol (VITAMIN D3) 25 mcg (1,000 unit) cap Take  by mouth. fexofenadine (ALLEGRA) 180 mg tablet Take 180 mg by mouth daily. aspirin 81 mg tablet Take 81 mg by mouth daily.          Past Surgical History:   Procedure Laterality Date    COLONOSCOPY N/A 2019    COLONOSCOPY performed by Lupe Santos MD at [facility] AMBULATORY OR    HX  SECTION      HX CHOLECYSTECTOMY  14    lap mando with cholangiogram    HX HYSTERECTOMY      HX OTHER SURGICAL  14    ERCPwith biliary sphincterotomy CBD balloon sweeps      HX TUBAL LIGATION      SC ABDOMEN SURGERY PROC UNLISTED  14    LAPAROSCOPIC CHOLECYSTECTOMY with GRAMS      Lab Results   Component Value Date/Time    WBC 9.7 10/12/2022 01:32 PM    HGB 12.5 10/12/2022 01:32 PM    HCT 40.3 10/12/2022 01:32 PM    PLATELET 999  01:32 PM    MCV 73.5 (L) 10/12/2022 01:32 PM     Lab Results   Component Value Date/Time    Cholesterol, total 138 2022 09:01 AM    HDL Cholesterol 46 2022 09:01 AM    LDL, calculated 68.8 2022 09:01 AM    Triglyceride 116 2022 09:01 AM    CHOL/HDL Ratio 3.0 2022 09:01 AM     Lab Results   Component Value Date/Time    GFR est non-AA 48 (L) 2022 09:01 AM    GFRNA, POC 48 (L) 2020 10:52 AM    GFR est AA 58 (L) 2022 09:01 AM    GFRAA, POC 59 (L) 2020 10:52 AM    Creatinine 1.26 (H) 10/12/2022 01:32 PM    Creatinine (POC) 1.1 2020 10:52 AM    BUN 20 10/12/2022 01:32 PM    Sodium 140 10/12/2022 01:32 PM    Potassium 3.6 10/12/2022 01:32 PM    Chloride 106 10/12/2022 01:32 PM    CO2 30 10/12/2022 01:32 PM        Review of Systems   Respiratory:  Negative for shortness of breath. Cardiovascular:  Positive for chest pain. Physical Exam  Constitutional:       General: She is not in acute distress. Appearance: Normal appearance. She is not ill-appearing, toxic-appearing or diaphoretic. HENT:      Head: Normocephalic and atraumatic. Eyes:      General:         Right eye: No discharge. Left eye: No discharge. Conjunctiva/sclera: Conjunctivae normal.   Neurological:      General: No focal deficit present. Mental Status: She is alert and oriented to person, place, and time. Psychiatric:         Mood and Affect: Mood normal.         Behavior: Behavior normal.       ASSESSMENT and PLAN    ICD-10-CM ICD-9-CM    1. Primary hypertension  B94 198.4 METABOLIC PANEL, COMPREHENSIVE      TSH 3RD GENERATION   Well-controlled on Dyazide and felodipine continue no change to dose   2. Coronary artery disease involving native coronary artery of native heart without angina pectoris  W08.02 575.50 METABOLIC PANEL, COMPREHENSIVE   Had a past evaluation in [place] many years ago continues on a daily aspirin see below   3. Pure hypercholesterolemia  E78.00 272.0 LIPID PANEL   Controlled on Lipitor previously check lipids prior to follow-up   4. IFG (impaired fasting glucose)  R73.01 790.21 HEMOGLOBIN A1C WITH EAG   Check A1c prior to follow-up   5.  Atypical chest pain  R07.89 786.59 REFERRAL TO CARDIOLOGY      NUCLEAR CARDIAC STRESS TEST   Patient with episode of atypical chest pain still present but mostly resolved the pain is reproducible and it started while she was walking but is not currently exertional symptoms actually worsened at rest and are exacerbated by palpation    Over the course of the last 3 days her symptoms have improved she did go to the emergency department for further evaluation she does not have pleuritic chest pain    In the ER troponins and BNP were normal    Of note a D-dimer was not checked but she is not short of breath at this time and had normal oxygenation    Chest x-ray was unrevealing    Discussed he can take limited amounts of ibuprofen creatinine is slightly bumped so this should not be a daily medication also Tylenol would be reasonable to use as needed    There is a GI component to her symptoms as well so we will start Protonix daily for the next 2 to 4 weeks to see if we can improve this aspect of her symptoms    Given her past history of coronary artery disease and lack of follow-up with cardiology over the last few years we will go ahead and have her see cardiology again and will get a stress test on a nonemergent basis for further evaluation    Discussed all of the above with her   6. Hypokalemia  B33.1 168.0 METABOLIC PANEL, COMPREHENSIVE   Unable to tolerate Klor-Con supplement due to size of pill however potassium levels were improved on recent labs she has increased potassium containing foods and will take a multivitamin     Depression screen reviewed and negative. Scribed by Armani Howe, as dictated by Dr. Geraldine Cavazos. Current diagnosis and concerns discussed with pt at length. Pt understands risks and benefits or current treatment plan and medications, and accepts the treatment and medication with any possible risks. Pt asks appropriate questions, which were answered. Pt was instructed to call with any concerns or problems. I have reviewed the note documented by the scribe. The services provided are my own. The documentation is accurate.   Wayne Kaur, who was evaluated through a synchronous (real-time) audio-video encounter, and/or her healthcare decision maker, is aware that it is a billable service, which includes applicable co-pays, with coverage as determined by her insurance carrier. She provided verbal consent to proceed and patient identification was verified. This visit was conducted pursuant to the emergency declaration under the [address] [place] [address] and the [employer] and [employer] General Act. A caregiver was present when appropriate. Ability to conduct physical exam was limited.  The patient was located at: Home: [address] [place] [address]  The provider was located at: Home: [unfilled]    Yun Earl on 10/14/2022 at 7:32 AM

## 2022-10-31 DIAGNOSIS — I10 ESSENTIAL HYPERTENSION: ICD-10-CM

## 2022-10-31 DIAGNOSIS — E78.00 PURE HYPERCHOLESTEROLEMIA: ICD-10-CM

## 2022-10-31 RX ORDER — ATORVASTATIN CALCIUM 20 MG/1
TABLET, FILM COATED ORAL
Qty: 90 TABLET | Refills: 1 | Status: SHIPPED | OUTPATIENT
Start: 2022-10-31

## 2022-10-31 RX ORDER — TRIAMTERENE AND HYDROCHLOROTHIAZIDE 37.5; 25 MG/1; MG/1
CAPSULE ORAL
Qty: 90 CAPSULE | Refills: 0 | Status: SHIPPED | OUTPATIENT
Start: 2022-10-31

## 2022-10-31 RX ORDER — FELODIPINE 5 MG/1
TABLET, EXTENDED RELEASE ORAL
Qty: 90 TABLET | Refills: 0 | Status: SHIPPED | OUTPATIENT
Start: 2022-10-31

## 2022-11-04 ENCOUNTER — HOSPITAL ENCOUNTER (OUTPATIENT)
Dept: NON INVASIVE DIAGNOSTICS | Age: 77
Discharge: HOME | End: 2022-11-04
Attending: INTERNAL MEDICINE
Payer: MEDICARE

## 2022-11-04 ENCOUNTER — HOSPITAL ENCOUNTER (OUTPATIENT)
Dept: NUCLEAR MEDICINE | Age: 77
Discharge: HOME | End: 2022-11-04
Attending: INTERNAL MEDICINE
Payer: MEDICARE

## 2022-11-04 DIAGNOSIS — R07.89 ATYPICAL CHEST PAIN: ICD-10-CM

## 2022-11-04 LAB
STRESS ANGINA INDEX: 1
STRESS BASELINE DIAS BP: 81 MMHG
STRESS BASELINE HR: 64 BPM
STRESS BASELINE ST DEPRESSION: 0 MM
STRESS BASELINE SYS BP: 145 MMHG
STRESS ESTIMATED WORKLOAD: 7 METS
STRESS EXERCISE DUR MIN: 7 MIN
STRESS EXERCISE DUR SEC: 23 SEC
STRESS PEAK DIAS BP: 104 MMHG
STRESS PEAK SYS BP: 168 MMHG
STRESS PERCENT HR ACHIEVED: 103 %
STRESS POST PEAK HR: 148 BPM
STRESS RATE PRESSURE PRODUCT: NORMAL BPM*MMHG
STRESS SR DUKE TREADMILL SCORE: 3
STRESS ST DEPRESSION: 0 MM
STRESS STAGE 1 BP: NORMAL MMHG
STRESS STAGE 1 HR: 110 BPM
STRESS STAGE 2 BP: NORMAL MMHG
STRESS STAGE 2 COMMENTS: NORMAL
STRESS STAGE 2 HR: 137 BPM
STRESS STAGE RECOVERY 1 HR: 103 BPM
STRESS STAGE RECOVERY 2 BP: NORMAL MMHG
STRESS STAGE RECOVERY 2 HR: 92 BPM
STRESS STAGE RECOVERY 3 BP: NORMAL MMHG
STRESS STAGE RECOVERY 3 HR: 89 BPM
STRESS TARGET HR: 143 BPM

## 2022-11-04 PROCEDURE — 78452 HT MUSCLE IMAGE SPECT MULT: CPT

## 2022-11-04 PROCEDURE — 93017 CV STRESS TEST TRACING ONLY: CPT

## 2022-11-04 RX ORDER — TETRAKIS(2-METHOXYISOBUTYLISOCYANIDE)COPPER(I) TETRAFLUOROBORATE 1 MG/ML
31.8 INJECTION, POWDER, LYOPHILIZED, FOR SOLUTION INTRAVENOUS
Status: COMPLETED | OUTPATIENT
Start: 2022-11-04 | End: 2022-11-04

## 2022-11-04 RX ORDER — TETRAKIS(2-METHOXYISOBUTYLISOCYANIDE)COPPER(I) TETRAFLUOROBORATE 1 MG/ML
11 INJECTION, POWDER, LYOPHILIZED, FOR SOLUTION INTRAVENOUS
Status: COMPLETED | OUTPATIENT
Start: 2022-11-04 | End: 2022-11-04

## 2022-11-04 RX ADMIN — TETRAKIS(2-METHOXYISOBUTYLISOCYANIDE)COPPER(I) TETRAFLUOROBORATE 31.8 MILLICURIE: 1 INJECTION, POWDER, LYOPHILIZED, FOR SOLUTION INTRAVENOUS at 11:00

## 2022-11-04 RX ADMIN — TETRAKIS(2-METHOXYISOBUTYLISOCYANIDE)COPPER(I) TETRAFLUOROBORATE 11 MILLICURIE: 1 INJECTION, POWDER, LYOPHILIZED, FOR SOLUTION INTRAVENOUS at 09:00

## 2022-11-08 ENCOUNTER — TELEPHONE (OUTPATIENT)
Dept: INTERNAL MEDICINE CLINIC | Age: 77
End: 2022-11-08

## 2022-11-08 RX ORDER — PANTOPRAZOLE SODIUM 20 MG/1
TABLET, DELAYED RELEASE ORAL
Qty: 30 TABLET | Refills: 0 | Status: SHIPPED | OUTPATIENT
Start: 2022-11-08

## 2022-11-08 NOTE — TELEPHONE ENCOUNTER
Patient states returning missed call    (Appears to be from  from yesterday regarding imaging)    Please call back when able.

## 2022-11-08 NOTE — PROGRESS NOTES
Called, spoke to pt  Received two pt identifiers   Pt informed per Dr. Fausto Capone Let her know stress test is normal    Has appt with Dr. Gal Arana (Minnie) on 11/28/22. Advised will send to cards  Pt verbalizes understanding of the instructions and has no further questions at this time.

## 2022-12-05 NOTE — PROGRESS NOTES
HISTORY OF PRESENT ILLNESS  Melisa Yap is a 67 y.o. female. HPI  Last here vv 10/14/22. Pt is here for follow up on chest pain. She notes some occasional urinary incontinence. Discussed this is a normal age-related change. Recommended timed bathroom breaks, avoiding caffeine, alcohol, Kegel exercises. Discussed uro gyn would be the next step. Has history of hypertension  BP today is 107/69  BP at home 128/70  BP at Dr. Ramya German: 138/84  Continues on dyazide 37.5-25mg and felodipine 5mg daily     Wt today is 152 lbs, up 3 lbs since lov  Her weight is within normal ranges     Reviewed labs   Will order labs today      Pt presented to the ER 10/12/22 w/ chest pain  Reviewed stress test 11/4/22:  Impression:   Normal gated rest/stress myocardial perfusion imaging study. No evidence of myocardial ischemia or infarction. Left ventricular ejection fraction is 83 %. She saw Dr. Huey Gaucher (cardio) 11/28/22 for atypical CP, no med changes  Reviewed note:  Impression and Plan:  01. Other chest pain (R07.89): Cor CTA first avail. Echo first avail. ECG done  02. Shortness of breath (R06.02): We will continue to monitor the patient closely. 03. Hypertension (I10): Reasonably well controlled. 04. Mixed hyperlipidemia (E78.2): Patient is tolerating lipid lowering therapy well. 05. Body mass index [BMI] 24.0-24.9, adult (U74.32)  Has cardiac CTA and echo scheduled 12/9/22   States she is still having some CP, will f/U with NP 12/9/22     Started back on protonix lov, however this did not make a difference for sx's  States that certain movements, and especially sitting up in bed, can cause the shooting pain. Discussed she can stop protonix if not helping. Will run labs (including D-dimer).  Discussed it could be musculoskeletal     Pt is taking ASA 81mg daily  Continues on lipitor 20mg daily for cholesterol    She previously saw Dr. Kel Dixon (GI) for constipation   She was given colace      Pt failed trazadone Gave elavil 10mg prn for sleep, has not been using it  Having trouble sleeping still work on sleep hygiene     Not having a lot of allergy issues      She saw Dr. Kel Han ([facility]) for kidney cyst  Lov 12/19/19 --discussed complicated cyst follow-up as needed stable for the past     Saw Dr. Fidel Shay (ENT)  in the past for mass R neck, no longer needs to follow with this physician      Continues on vit D OTC 1000U daily      No longer taking klor con       ACP on file. SDM is her  Nahomi Carr). Recall saw cardiologist in past for CP. No blockage was found. Pt lives with        PREVENTIVE:    Colonoscopy: 12/19 Dr. John Bains, repeat 5 years,  severe diverticulosis  AAA: CT abd 10/18, negative  Pap: Dr. Rommie Riedel, 7/15, every other year, hysterectomy and BSO in 1994, will only f/u prn  Mammogram: 1/08/22 negative, due 1/23 ordered   DEXA: 6/21/22, osteopenia  Tdap: 4/08/2015  Pneumovax: 9/02/2014  Pfzklaj97: 11/03/2016  Zostavax: 2010    Shingrix: both  Flu shot: Fall 2022   A1c: 6/21 5.6, 12/21 5.6 6/22 5.8  Eye exam: Dr. Dell Mckeon in [place], 9/22  Lipids: 6/22 LDL 68   Covid: 4 doses (Moderna)    Patient Active Problem List    Diagnosis Date Noted    IFG (impaired fasting glucose) 11/05/2019    Angioedema 08/25/2016    Hyperlipidemia 04/01/2013    CAD (coronary artery disease) 10/01/2012    H/O allergy     Hypertension      Current Outpatient Medications   Medication Sig Dispense Refill    pantoprazole (PROTONIX) 20 mg tablet TAKE 1 TABLET BY MOUTH EVERY DAY 30 Tablet 0    triamterene-hydroCHLOROthiazide (DYAZIDE) 37.5-25 mg per capsule TAKE 1 CAPSULE DAILY 90 Capsule 0    atorvastatin (LIPITOR) 20 mg tablet TAKE 1 TABLET DAILY 90 Tablet 1    felodipine (PLENDIL SR) 5 mg 24 hr tablet TAKE 1 TABLET DAILY 90 Tablet 0    diclofenac (VOLTAREN) 1 % gel Apply 2 g to affected area four (4) times daily. prn 1 Each 0    Ketoconazole 2 % topical foam Apply  to affected area two (2) times a day.  100 g 0 dorzolamide-timolol (COSOPT) 22.3-6.8 mg/mL ophthalmic solution       cholecalciferol (VITAMIN D3) 25 mcg (1,000 unit) cap Take  by mouth. fexofenadine (ALLEGRA) 180 mg tablet Take 180 mg by mouth daily. aspirin 81 mg tablet Take 81 mg by mouth daily. Past Surgical History:   Procedure Laterality Date    COLONOSCOPY N/A 2019    COLONOSCOPY performed by Silvia Pemberton MD at [facility] AMBULATORY OR    HX  SECTION      HX CHOLECYSTECTOMY  14    lap mando with cholangiogram    HX HYSTERECTOMY      HX OTHER SURGICAL  14    ERCPwith biliary sphincterotomy CBD balloon sweeps      HX TUBAL LIGATION      IN ABDOMEN SURGERY PROC UNLISTED  14    LAPAROSCOPIC CHOLECYSTECTOMY with GRAMS      Lab Results   Component Value Date/Time    WBC 9.7 10/12/2022 01:32 PM    HGB 12.5 10/12/2022 01:32 PM    HCT 40.3 10/12/2022 01:32 PM    PLATELET 955  01:32 PM    MCV 73.5 (L) 10/12/2022 01:32 PM     Lab Results   Component Value Date/Time    Cholesterol, total 138 2022 09:01 AM    HDL Cholesterol 46 2022 09:01 AM    LDL, calculated 68.8 2022 09:01 AM    Triglyceride 116 2022 09:01 AM    CHOL/HDL Ratio 3.0 2022 09:01 AM     Lab Results   Component Value Date/Time    GFR est non-AA 48 (L) 2022 09:01 AM    GFRNA, POC 48 (L) 2020 10:52 AM    GFR est AA 58 (L) 2022 09:01 AM    GFRAA, POC 59 (L) 2020 10:52 AM    Creatinine 1.26 (H) 10/12/2022 01:32 PM    Creatinine (POC) 1.1 2020 10:52 AM    BUN 20 10/12/2022 01:32 PM    Sodium 140 10/12/2022 01:32 PM    Potassium 3.6 10/12/2022 01:32 PM    Chloride 106 10/12/2022 01:32 PM    CO2 30 10/12/2022 01:32 PM        Review of Systems   Respiratory:  Negative for shortness of breath. Cardiovascular:  Negative for chest pain. Physical Exam  Constitutional:       General: She is not in acute distress. Appearance: She is not ill-appearing, toxic-appearing or diaphoretic. HENT:      Head: Normocephalic and atraumatic. Right Ear: External ear normal.      Left Ear: External ear normal.   Eyes:      General:         Right eye: No discharge. Left eye: No discharge. Conjunctiva/sclera: Conjunctivae normal.      Pupils: Pupils are equal, round, and reactive to light. Cardiovascular:      Rate and Rhythm: Normal rate and regular rhythm. Heart sounds: No murmur heard. No friction rub. No gallop. Pulmonary:      Effort: No respiratory distress. Breath sounds: Normal breath sounds. No wheezing or rales. Chest:      Chest wall: No tenderness. Musculoskeletal:         General: Normal range of motion. Cervical back: Normal.   Skin:     General: Skin is warm and dry. Neurological:      Mental Status: She is oriented to person, place, and time. Mental status is at baseline. Coordination: Coordination normal.      Gait: Gait normal.   Psychiatric:         Mood and Affect: Mood normal.         Behavior: Behavior normal.       ASSESSMENT and PLAN    ICD-10-CM ICD-9-CM    1. Primary hypertension  I10 401.9 LIPID PANEL      TSH 3RD GENERATION      HEMOGLOBIN A1C WITH EAG   Controlled on Dyazide and felodipine continue check metabolic panel for K creatinine sodium levels   METABOLIC PANEL, COMPREHENSIVE      D DIMER      2. Coronary artery disease involving native coronary artery of native heart without angina pectoris  I25.10 414.01 LIPID PANEL      TSH 3RD GENERATION      HEMOGLOBIN A1C WITH EAG   Remote history of minimal heart disease on aspirin daily currently under cardiology evaluation for atypical chest pain see below stress test was just completed and recently in normal   METABOLIC PANEL, COMPREHENSIVE      D DIMER      3. IFG (impaired fasting glucose)  R73.01 790.21 LIPID PANEL      TSH 3RD GENERATION      HEMOGLOBIN A1C WITH EAG   Check A1c diet controlled   METABOLIC PANEL, COMPREHENSIVE      D DIMER      4.  Pure hypercholesterolemia E78.00 272.0 LIPID PANEL      TSH 3RD GENERATION      HEMOGLOBIN A1C WITH EAG   Controlled Lipitor check lipids adjust dose as needed   METABOLIC PANEL, COMPREHENSIVE      D DIMER      5. Atypical chest pain  R07.89 786.59 LIPID PANEL      TSH 3RD GENERATION      HEMOGLOBIN A1C WITH EAG   Patient with atypical chest pain unlikely cardiac in etiology stress test negative    Echo and CT of the heart are pending    Will get D-dimer we will check for PE if D-dimer positive    Protonix did not improve her symptoms at all she can stop this    Again likely musculoskeletal in the end   METABOLIC PANEL, COMPREHENSIVE      D DIMER      6. Hypokalemia  E87.6 276.8    Not taking potassium supplements due to difficulty swallowing check level treat as needed   7. Urinary incontinence, unspecified type  R32 788.30    Discussed timed bathroom breaks and strengthening exercises not interested medication at this time   8. Encounter for screening mammogram for malignant neoplasm of breast  Z12.31 V76.12 WIN MAMMO BI SCREENING INCL CAD      Depression screen reviewed and negative. Scribed by Bharati Ware, as dictated by Dr. Roxann Shane. Current diagnosis and concerns discussed with pt at length. Pt understands risks and benefits or current treatment plan and medications, and accepts the treatment and medication with any possible risks. Pt asks appropriate questions, which were answered. Pt was instructed to call with any concerns or problems. I have reviewed the note documented by the scribe. The services provided are my own. The documentation is accurate.

## 2022-12-06 RX ORDER — PANTOPRAZOLE SODIUM 20 MG/1
TABLET, DELAYED RELEASE ORAL
Qty: 30 TABLET | Refills: 0 | Status: SHIPPED | OUTPATIENT
Start: 2022-12-06 | End: 2022-12-07

## 2022-12-07 ENCOUNTER — OFFICE VISIT (OUTPATIENT)
Dept: INTERNAL MEDICINE CLINIC | Age: 77
End: 2022-12-07
Payer: MEDICARE

## 2022-12-07 VITALS
OXYGEN SATURATION: 99 % | HEART RATE: 79 BPM | SYSTOLIC BLOOD PRESSURE: 107 MMHG | WEIGHT: 152 LBS | BODY MASS INDEX: 24.43 KG/M2 | HEIGHT: 66 IN | DIASTOLIC BLOOD PRESSURE: 69 MMHG | RESPIRATION RATE: 16 BRPM | TEMPERATURE: 98.1 F

## 2022-12-07 DIAGNOSIS — E87.6 HYPOKALEMIA: ICD-10-CM

## 2022-12-07 DIAGNOSIS — Z12.31 ENCOUNTER FOR SCREENING MAMMOGRAM FOR MALIGNANT NEOPLASM OF BREAST: ICD-10-CM

## 2022-12-07 DIAGNOSIS — E78.00 PURE HYPERCHOLESTEROLEMIA: ICD-10-CM

## 2022-12-07 DIAGNOSIS — I25.10 CORONARY ARTERY DISEASE INVOLVING NATIVE CORONARY ARTERY OF NATIVE HEART WITHOUT ANGINA PECTORIS: ICD-10-CM

## 2022-12-07 DIAGNOSIS — R32 URINARY INCONTINENCE, UNSPECIFIED TYPE: ICD-10-CM

## 2022-12-07 DIAGNOSIS — R07.89 ATYPICAL CHEST PAIN: ICD-10-CM

## 2022-12-07 DIAGNOSIS — R73.01 IFG (IMPAIRED FASTING GLUCOSE): ICD-10-CM

## 2022-12-07 DIAGNOSIS — I10 PRIMARY HYPERTENSION: Primary | ICD-10-CM

## 2022-12-07 LAB — D DIMER PPP FEU-MCNC: 0.27 MG/L FEU (ref 0–0.65)

## 2022-12-07 NOTE — PATIENT INSTRUCTIONS
Medicare Wellness Visit, Female     The best way to live healthy is to have a lifestyle where you eat a well-balanced diet, exercise regularly, limit alcohol use, and quit all forms of tobacco/nicotine, if applicable. Regular preventive services are another way to keep healthy. Preventive services (vaccines, screening tests, monitoring & exams) can help personalize your care plan, which helps you manage your own care. Screening tests can find health problems at the earliest stages, when they are easiest to treat. [facility] follows the current, evidence-based guidelines published by the [facility] Fabian Lozada ([facility]) when recommending preventive services for our patients. Because we follow these guidelines, sometimes recommendations change over time as research supports it. (For example, mammograms used to be recommended annually. Even though Medicare will still pay for an annual mammogram, the newer guidelines recommend a mammogram every two years for women of average risk). Of course, you and your doctor may decide to screen more often for some diseases, based on your risk and your co-morbidities (chronic disease you are already diagnosed with). Preventive services for you include:  - Medicare offers their members a free annual wellness visit, which is time for you and your primary care provider to discuss and plan for your preventive service needs.  Take advantage of this benefit every year!    -Over the age of 72 should receive the recommended pneumonia vaccines.    -All adults should have a flu vaccine yearly.  -All adults should have a tetanus vaccine every 10 years.   -Over the age 48 should receive the shingles vaccines.        -All adults should be screened once for Hepatitis C.  -All adults age 38-68 who are overweight should have a diabetes screening test once every three years.   -Other screening tests and preventive services for persons with diabetes include: an eye exam to screen for diabetic retinopathy, a kidney function test, a foot exam, and stricter control over your cholesterol.   -Cardiovascular screening for adults with routine risk involves an electrocardiogram (ECG) at intervals determined by your doctor.     -Colorectal cancer screenings should be done for adults age 39-70 with no increased risk factors for colorectal cancer. There are a number of acceptable methods of screening for this type of cancer. Each test has its own benefits and drawbacks. Discuss with your doctor what is most appropriate for you during your annual wellness visit. The different tests include: colonoscopy (considered the best screening method), a fecal occult blood test, a fecal DNA test, and sigmoidoscopy.    -Lung cancer screening is recommended annually with a low dose CT scan for adults between age 54 and 68, who have smoked at least 30 pack years (equivalent of 1 pack per day for 30 days), and who is a current smoker or quit less than 15 years ago.    -A bone mass density test is recommended when a woman turns 65 to screen for osteoporosis. This test is only recommended one time, as a screening. Some providers will use this same test as a disease monitoring tool if you already have osteoporosis. -Breast cancer screenings are recommended every other year for women of normal risk, age 54-69.    -Cervical cancer screenings for women over age 72 are only recommended with certain risk factors. Here is a list of your current Health Maintenance items (your personalized list of preventive services) with a due date: There are no preventive care reminders to display for this patient.

## 2022-12-07 NOTE — PROGRESS NOTES
1. \"Have you been to the ER, urgent care clinic since your last visit? Hospitalized since your last visit? \" No    2. \"Have you seen or consulted any other health care providers outside of the [address] [place] [address] since your last visit? \" No     3. For patients aged 39-70: Has the patient had a colonoscopy / FIT/ Cologuard? Yes - Care Gap present. Most recent result on file      If the patient is female:    4. For patients aged 41-77: Has the patient had a mammogram within the past 2 years? Yes - Care Gap present. Most recent result on file      5. For patients aged 21-65: Has the patient had a pap smear? Yes - Care Gap present.  Most recent result on file

## 2022-12-08 LAB
ALBUMIN SERPL-MCNC: 4 G/DL (ref 3.5–5)
ALBUMIN/GLOB SERPL: 1.1 {RATIO} (ref 1.1–2.2)
ALP SERPL-CCNC: 78 U/L (ref 45–117)
ALT SERPL-CCNC: 27 U/L (ref 12–78)
ANION GAP SERPL CALC-SCNC: 5 MMOL/L (ref 5–15)
AST SERPL-CCNC: 13 U/L (ref 15–37)
BILIRUB SERPL-MCNC: 0.3 MG/DL (ref 0.2–1)
BUN SERPL-MCNC: 21 MG/DL (ref 6–20)
BUN/CREAT SERPL: 19 (ref 12–20)
CALCIUM SERPL-MCNC: 9.2 MG/DL (ref 8.5–10.1)
CHLORIDE SERPL-SCNC: 107 MMOL/L (ref 97–108)
CHOLEST SERPL-MCNC: 132 MG/DL
CO2 SERPL-SCNC: 29 MMOL/L (ref 21–32)
COMMENT, HOLDF: NORMAL
CREAT SERPL-MCNC: 1.1 MG/DL (ref 0.55–1.02)
EST. AVERAGE GLUCOSE BLD GHB EST-MCNC: 120 MG/DL
GLOBULIN SER CALC-MCNC: 3.5 G/DL (ref 2–4)
GLUCOSE SERPL-MCNC: 113 MG/DL (ref 65–100)
HBA1C MFR BLD: 5.8 % (ref 4–5.6)
HDLC SERPL-MCNC: 51 MG/DL
HDLC SERPL: 2.6 {RATIO} (ref 0–5)
LDLC SERPL CALC-MCNC: 68.6 MG/DL (ref 0–100)
POTASSIUM SERPL-SCNC: 4 MMOL/L (ref 3.5–5.1)
PROT SERPL-MCNC: 7.5 G/DL (ref 6.4–8.2)
SAMPLES BEING HELD,HOLD: NORMAL
SODIUM SERPL-SCNC: 141 MMOL/L (ref 136–145)
TRIGL SERPL-MCNC: 62 MG/DL (ref ?–150)
TSH SERPL DL<=0.05 MIU/L-ACNC: 1.39 UIU/ML (ref 0.36–3.74)
VLDLC SERPL CALC-MCNC: 12.4 MG/DL

## 2023-01-11 ENCOUNTER — APPOINTMENT (OUTPATIENT)
Dept: CT IMAGING | Age: 78
End: 2023-01-11
Attending: EMERGENCY MEDICINE
Payer: MEDICARE

## 2023-01-11 ENCOUNTER — HOSPITAL ENCOUNTER (EMERGENCY)
Age: 78
Discharge: HOME | End: 2023-01-11
Attending: EMERGENCY MEDICINE
Payer: MEDICARE

## 2023-01-11 ENCOUNTER — APPOINTMENT (OUTPATIENT)
Dept: GENERAL RADIOLOGY | Age: 78
End: 2023-01-11
Attending: EMERGENCY MEDICINE
Payer: MEDICARE

## 2023-01-11 VITALS
TEMPERATURE: 99.3 F | DIASTOLIC BLOOD PRESSURE: 66 MMHG | HEART RATE: 98 BPM | SYSTOLIC BLOOD PRESSURE: 112 MMHG | RESPIRATION RATE: 13 BRPM | OXYGEN SATURATION: 96 % | BODY MASS INDEX: 25.07 KG/M2 | HEIGHT: 66 IN | WEIGHT: 156 LBS

## 2023-01-11 DIAGNOSIS — E86.0 DEHYDRATION: ICD-10-CM

## 2023-01-11 DIAGNOSIS — N17.9 AKI (ACUTE KIDNEY INJURY) (HCC): ICD-10-CM

## 2023-01-11 DIAGNOSIS — R55 SYNCOPE AND COLLAPSE: Primary | ICD-10-CM

## 2023-01-11 LAB
ALBUMIN SERPL-MCNC: 4 G/DL (ref 3.5–5)
ALBUMIN/GLOB SERPL: 0.9 (ref 1.1–2.2)
ALP SERPL-CCNC: 92 U/L (ref 45–117)
ALT SERPL-CCNC: 62 U/L (ref 12–78)
ANION GAP SERPL CALC-SCNC: 8 MMOL/L (ref 5–15)
AST SERPL-CCNC: 61 U/L (ref 15–37)
BASOPHILS # BLD: 0 K/UL (ref 0–0.1)
BASOPHILS NFR BLD: 0 % (ref 0–1)
BDY SITE: ABNORMAL
BILIRUB SERPL-MCNC: 0.7 MG/DL (ref 0.2–1)
BNP SERPL-MCNC: 63 PG/ML
BUN SERPL-MCNC: 26 MG/DL (ref 6–20)
BUN/CREAT SERPL: 17 (ref 12–20)
CALCIUM SERPL-MCNC: 9.5 MG/DL (ref 8.5–10.1)
CHLORIDE SERPL-SCNC: 105 MMOL/L (ref 97–108)
CO2 SERPL-SCNC: 27 MMOL/L (ref 21–32)
COHGB MFR BLD: 2.6 % (ref 1–2)
COMMENT, HOLDF: NORMAL
CREAT SERPL-MCNC: 1.51 MG/DL (ref 0.55–1.02)
DIFFERENTIAL METHOD BLD: ABNORMAL
EOSINOPHIL # BLD: 0 K/UL (ref 0–0.4)
EOSINOPHIL NFR BLD: 0 % (ref 0–7)
ERYTHROCYTE [DISTWIDTH] IN BLOOD BY AUTOMATED COUNT: 15.1 % (ref 11.5–14.5)
GLOBULIN SER CALC-MCNC: 4.4 G/DL (ref 2–4)
GLUCOSE SERPL-MCNC: 105 MG/DL (ref 65–100)
HCT VFR BLD AUTO: 42.2 % (ref 35–47)
HGB BLD-MCNC: 13.2 G/DL (ref 11.5–16)
IMM GRANULOCYTES # BLD AUTO: 0.1 K/UL (ref 0–0.04)
IMM GRANULOCYTES NFR BLD AUTO: 1 % (ref 0–0.5)
LYMPHOCYTES # BLD: 0.2 K/UL (ref 0.8–3.5)
LYMPHOCYTES NFR BLD: 2 % (ref 12–49)
MCH RBC QN AUTO: 22.8 PG (ref 26–34)
MCHC RBC AUTO-ENTMCNC: 31.3 G/DL (ref 30–36.5)
MCV RBC AUTO: 73 FL (ref 80–99)
METHGB MFR BLD: 0.3 % (ref 0–1.4)
MONOCYTES # BLD: 0.1 K/UL (ref 0–1)
MONOCYTES NFR BLD: 1 % (ref 5–13)
NEUTS SEG # BLD: 11.4 K/UL (ref 1.8–8)
NEUTS SEG NFR BLD: 96 % (ref 32–75)
NRBC # BLD: 0 K/UL (ref 0–0.01)
NRBC BLD-RTO: 0 PER 100 WBC
OXYHGB MFR BLD: 71.7 % (ref 94–97)
PLATELET # BLD AUTO: 262 K/UL (ref 150–400)
PMV BLD AUTO: 9.9 FL (ref 8.9–12.9)
POTASSIUM SERPL-SCNC: 3.3 MMOL/L (ref 3.5–5.1)
PROT SERPL-MCNC: 8.4 G/DL (ref 6.4–8.2)
RBC # BLD AUTO: 5.78 M/UL (ref 3.8–5.2)
RBC MORPH BLD: ABNORMAL
SAMPLES BEING HELD,HOLD: NORMAL
SAO2 % BLD: 74 % (ref 95–99)
SODIUM SERPL-SCNC: 140 MMOL/L (ref 136–145)
SPECIMEN SITE: ABNORMAL
TROPONIN-HIGH SENSITIVITY: 6 NG/L (ref 0–51)
TROPONIN-HIGH SENSITIVITY: <4 NG/L (ref 0–51)
WBC # BLD AUTO: 11.8 K/UL (ref 3.6–11)

## 2023-01-11 PROCEDURE — 71275 CT ANGIOGRAPHY CHEST: CPT

## 2023-01-11 PROCEDURE — 71045 X-RAY EXAM CHEST 1 VIEW: CPT

## 2023-01-11 PROCEDURE — 80053 COMPREHEN METABOLIC PANEL: CPT

## 2023-01-11 PROCEDURE — 74011250636 HC RX REV CODE- 250/636: Performed by: EMERGENCY MEDICINE

## 2023-01-11 PROCEDURE — 93005 ELECTROCARDIOGRAM TRACING: CPT

## 2023-01-11 PROCEDURE — 85025 COMPLETE CBC W/AUTO DIFF WBC: CPT

## 2023-01-11 PROCEDURE — 83880 ASSAY OF NATRIURETIC PEPTIDE: CPT

## 2023-01-11 PROCEDURE — 96360 HYDRATION IV INFUSION INIT: CPT

## 2023-01-11 PROCEDURE — 96361 HYDRATE IV INFUSION ADD-ON: CPT

## 2023-01-11 PROCEDURE — 70450 CT HEAD/BRAIN W/O DYE: CPT

## 2023-01-11 PROCEDURE — 84484 ASSAY OF TROPONIN QUANT: CPT

## 2023-01-11 PROCEDURE — 99285 EMERGENCY DEPT VISIT HI MDM: CPT

## 2023-01-11 PROCEDURE — 36415 COLL VENOUS BLD VENIPUNCTURE: CPT

## 2023-01-11 PROCEDURE — 82375 ASSAY CARBOXYHB QUANT: CPT

## 2023-01-11 PROCEDURE — 74011000636 HC RX REV CODE- 636

## 2023-01-11 RX ADMIN — SODIUM CHLORIDE 500 ML: 9 INJECTION, SOLUTION INTRAVENOUS at 16:09

## 2023-01-11 RX ADMIN — SODIUM CHLORIDE 1000 ML: 9 INJECTION, SOLUTION INTRAVENOUS at 13:23

## 2023-01-11 RX ADMIN — IOPAMIDOL 100 ML: 755 INJECTION, SOLUTION INTRAVENOUS at 14:24

## 2023-01-11 NOTE — DISCHARGE INSTRUCTIONS
You were evaluated in the emergency department for syncope. Your examination was reassuring as was your work-up including blood work, EKG, and CT scans. It will be important for you to follow-up with your primary care physician in 2-3 days. If you develop worsening symptoms such as passing out episodes, chest pain or shortness of breath, please return to the emergency department immediately.

## 2023-01-11 NOTE — ED NOTES
1330-Patient resting; no complaints at this time; waiting for CTA. RT notified of blood for carboxy hgb.

## 2023-01-12 LAB
ATRIAL RATE: 115 BPM
CALCULATED P AXIS, ECG09: 43 DEGREES
CALCULATED R AXIS, ECG10: 35 DEGREES
CALCULATED T AXIS, ECG11: 36 DEGREES
DIAGNOSIS, 93000: NORMAL
P-R INTERVAL, ECG05: 162 MS
Q-T INTERVAL, ECG07: 310 MS
QRS DURATION, ECG06: 64 MS
QTC CALCULATION (BEZET), ECG08: 428 MS
VENTRICULAR RATE, ECG03: 115 BPM

## 2023-01-12 NOTE — ED PROVIDER NOTES
[facility] EMERGENCY DEPT  EMERGENCY DEPARTMENT ENCOUNTER       Pt Name: Adán Earl  MRN: [id number]  [place] 1945  Date of evaluation: 1/11/2023  Provider: Nilda Skinner MD   PCP: Lennie Boyle MD  Note Started: 8:20 PM 1/11/23     CHIEF COMPLAINT       Chief Complaint   Patient presents with    Syncope     Arrives with EMS for having a syncopal episode today while pumping gas. +LOC, doesn't remember event; denies any chest pain, SOB. No blood thinners. Vomited x 1 PTA        HISTORY OF PRESENT ILLNESS: 1 or more elements      History From: Patient, , EMS. History limited by: No limitations     Adán Earl is a 65 y.o. female with history of hypertension who presents with chief complaint of syncope. This occurred earlier today around 9 AM while at a gas station, she was pumping gas and had just gotten out of her car when she had sudden syncopal episode, she slumped against the car and then fell onto her buttocks. She did not strike her head. She had brief regain of consciousness. EMS called at that time and patient was transported to the ED without any complications or other complaints. Upon arrival in the ED she feels well, denies any headache, chest pain, shortness of breath, abdominal pain, nausea, vomiting. She did have nausea and one episode of nonbloody nonbilious emesis however right after her syncopal episode. States she has been in her normal state of health. She has been tolerating normal p.o. intake over the past few days without any medication changes. However she did not eat breakfast this morning or taken any fluids before starting her diet this morning. Denies any history of VTE or CAD. Nursing Notes were all reviewed and agreed with or any disagreements were addressed in the HPI. REVIEW OF SYSTEMS        Positives and Pertinent negatives as per HPI.     PAST HISTORY     Past Medical History:  Past Medical History:   Diagnosis Date    Dyspepsia and other specified disorders of function of stomach     GERD (gastroesophageal reflux disease)     H/O allergy     Hypercholesterolemia     Hypertension     Indigestion     Other ill-defined conditions(239.21)     elevated cholesterol       Past Surgical History:  Past Surgical History:   Procedure Laterality Date    COLONOSCOPY N/A 2019    COLONOSCOPY performed by Rupali Robertson MD at [facility] AMBULATORY OR    HX  SECTION      HX CHOLECYSTECTOMY  14    lap mando with cholangiogram    HX HYSTERECTOMY      HX OTHER SURGICAL  14    ERCPwith biliary sphincterotomy CBD balloon sweeps      HX TUBAL LIGATION      PA ABDOMEN SURGERY PROC UNLISTED  14    LAPAROSCOPIC CHOLECYSTECTOMY with GRAMS       Family History:  Family History   Problem Relation Age of Onset    Hypertension Mother     Cancer Brother         prostate       Social History:  Social History     Tobacco Use    Smoking status: Never    Smokeless tobacco: Never   Substance Use Topics    Alcohol use: No    Drug use: No       Allergies:  No Known Allergies    CURRENT MEDICATIONS      Discharge Medication List as of 2023  6:29 PM        CONTINUE these medications which have NOT CHANGED    Details   triamterene-hydroCHLOROthiazide (DYAZIDE) 37.5-25 mg per capsule TAKE 1 CAPSULE DAILY, Normal, Disp-90 Capsule, R-0      atorvastatin (LIPITOR) 20 mg tablet TAKE 1 TABLET DAILY, Normal, Disp-90 Tablet, R-1      felodipine (PLENDIL SR) 5 mg 24 hr tablet TAKE 1 TABLET DAILY, Normal, Disp-90 Tablet, R-0      diclofenac (VOLTAREN) 1 % gel Apply 2 g to affected area four (4) times daily.  prn, Normal, Disp-1 Each, R-0      Ketoconazole 2 % topical foam Apply  to affected area two (2) times a day., Normal, Disp-100 g, R-0      dorzolamide-timolol (COSOPT) 22.3-6.8 mg/mL ophthalmic solution Historical Med      cholecalciferol (VITAMIN D3) 25 mcg (1,000 unit) cap Take  by mouth., Historical Med      fexofenadine (ALLEGRA) 180 mg tablet Take 180 mg by mouth daily. , Historical Med      aspirin 81 mg tablet Take 81 mg by mouth daily. Historical Med, 81 mg             SCREENINGS               No data recorded         PHYSICAL EXAM      ED Triage Vitals [01/11/23 1214]   ED Encounter Vitals Group      /68      Pulse (Heart Rate) (!) 115      Resp Rate 15      Temp 99.3 °F (37.4 °C)      Temp src       O2 Sat (%) 96 %      Weight 156 lb      Height 5' 6\"        Physical Exam  Vitals and nursing note reviewed. Constitutional:       General: She is not in acute distress. Appearance: Normal appearance. She is not ill-appearing. HENT:      Head: Normocephalic and atraumatic. Eyes:      Extraocular Movements: Extraocular movements intact. Pupils: Pupils are equal, round, and reactive to light. Cardiovascular:      Rate and Rhythm: Normal rate and regular rhythm. Heart sounds: No murmur heard. Pulmonary:      Effort: Pulmonary effort is normal. No respiratory distress. Breath sounds: Normal breath sounds. Abdominal:      General: Abdomen is flat. There is no distension. Palpations: Abdomen is soft. There is no mass. Musculoskeletal:      Cervical back: Normal range of motion and neck supple. No tenderness. Skin:     General: Skin is warm and dry. Neurological:      General: No focal deficit present. Mental Status: She is alert and oriented to person, place, and time. Comments: Cranial nerves intact, motor 5/5 throughout, sensation intact, no cerebellar deficits. NIHSS 0        DIAGNOSTIC RESULTS   LABS:     Labs Reviewed   CBC WITH AUTOMATED DIFF - Abnormal; Notable for the following components:       Result Value    WBC 11.8 (*)     RBC 5.78 (*)     MCV 73.0 (*)     MCH 22.8 (*)     RDW 15.1 (*)     NEUTROPHILS 96 (*)     LYMPHOCYTES 2 (*)     MONOCYTES 1 (*)     IMMATURE GRANULOCYTES 1 (*)     ABS. NEUTROPHILS 11.4 (*)     ABS. LYMPHOCYTES 0.2 (*)     ABS. IMM.  GRANS. 0.1 (*)     All other components within normal limits   METABOLIC PANEL, COMPREHENSIVE - Abnormal; Notable for the following components:    Potassium 3.3 (*)     Glucose 105 (*)     BUN 26 (*)     Creatinine 1.51 (*)     eGFR 35 (*)     AST (SGOT) 61 (*)     Protein, total 8.4 (*)     Globulin 4.4 (*)     A-G Ratio 0.9 (*)     All other components within normal limits   CARBOXYHEMOGLOBIN - Abnormal; Notable for the following components:    Carboxy-Hgb 2.6 (*)     Oxyhemoglobin 71.7 (*)     O2 SATURATION 74 (*)     All other components within normal limits   TROPONIN-HIGH SENSITIVITY   SAMPLES BEING HELD   CARBOXY HEMOGLOBIN   NT-PRO BNP   TROPONIN-HIGH SENSITIVITY         EKG: If performed, independent interpretation documented below in the MDM section     RADIOLOGY:  Non-plain film images such as CT, Ultrasound and MRI are read by the radiologist. Plain radiographic images are visualized and preliminarily interpreted by the ED Provider with the findings documented in the MDM section. Interpretation per the Radiologist below, if available at the time of this note:     CT HEAD WO CONT    Result Date: 1/11/2023  Indication:  syncope Comparison: None Findings: 5 mm axial images were obtained from the skull base through the vertex. CT dose reduction was achieved through the use of a standardized protocol tailored for this examination and automatic exposure control for dose modulation. The ventricles and cortical sulci are prominent, compatible with age related volume loss. There is no evidence of intracranial hemorrhage, mass, mass effect, or acute infarct. Bilateral basal ganglia calcifications are noted. There is periventricular white matter disease. No extra-axial fluid collections are seen. The visualized paranasal sinuses and mastoid air cells are clear. The orbital structures are unremarkable. No osseous abnormalities are seen. 1. No evidence of acute infarct or intracranial hemorrhage.  2. Mild periventricular white matter disease is likely secondary to chronic small vessel ischemic changes. CTA CHEST W OR W WO CONT    Result Date: 1/11/2023  EXAM:  CTA CHEST W OR W WO CONT INDICATION: Syncope, tachycardia COMPARISON: None. TECHNIQUE: Helical thin section chest CT following uneventful intravenous administration of nonionic contrast 100 mL of isovue 370 according to departmental PE protocol. Coronal and sagittal reformats were performed. 3D post processing was performed. CT dose reduction was achieved through the use of a standardized protocol tailored for this examination and automatic exposure control for dose modulation. FINDINGS: This is a good quality study for the evaluation of pulmonary embolism to the first subsegmental arterial level. There is no pulmonary embolism to this level. THYROID: No nodule. MEDIASTINUM: No mass or lymphadenopathy. Small hiatal hernia. ROWDY: No mass or lymphadenopathy. THORACIC AORTA: No aneurysm. HEART: Normal in size. ESOPHAGUS: No wall thickening or dilatation. TRACHEA/BRONCHI: Patent. PLEURA: No effusion or pneumothorax. LUNGS: No nodule, mass, or airspace disease. UPPER ABDOMEN: Partially imaged. No acute pathology. Status post cholecystectomy. Pneumobilia is likely postoperative in nature. BONES: No aggressive bone lesion or fracture. No pulmonary embolism, no acute airspace disease. XR CHEST PORT    Result Date: 1/11/2023  EXAM: Portable CXR. 1305 hours. INDICATION: syncope FINDINGS: The lungs appear clear. Heart is normal in size. There is no pulmonary edema. There is no evident pneumothorax or pleural effusion. No Acute Disease.          PROCEDURES   Unless otherwise noted below, none  Procedures     CRITICAL CARE TIME   0    EMERGENCY DEPARTMENT COURSE and DIFFERENTIAL DIAGNOSIS/MDM   Vitals:    Vitals:    01/11/23 1315 01/11/23 1330 01/11/23 1822 01/11/23 1830   BP: 136/66 115/74  112/66   Pulse: (!) 114 (!) 113 98 98   Resp: 17 16 23 13   Temp:       SpO2: 95% 96% 96% 96%   Weight: Height:            Patient was given the following medications:  Medications   sodium chloride 0.9 % bolus infusion 1,000 mL (0 mL IntraVENous IV Completed 1/11/23 1423)   iopamidoL (ISOVUE-370) 370 mg iodine /mL (76 %) injection (100 mL  Given 1/11/23 1424)   sodium chloride 0.9 % bolus infusion 500 mL (0 mL IntraVENous IV Completed 1/11/23 1709)       Medical Decision Making  Amount and/or Complexity of Data Reviewed  Independent Historian: spouse and EMS     Details: EMS,   Labs: ordered. Decision-making details documented in ED Course. Radiology: ordered and independent interpretation performed. Decision-making details documented in ED Course. ECG/medicine tests: ordered and independent interpretation performed. Decision-making details documented in ED Course. Risk  OTC drugs. Decision regarding hospitalization. This is a 76-year-old female presenting to the emergency department with syncopal episode at [facility]. She is afebrile and vital signs are stable. She has no focal neurologic deficits, she is asymptomatic here in the ED, and appears clinically well and nontoxic. Differential diagnosis includes vasovagal syncope, orthostatic hypotension with syncope, arrhythmia, PE, ACS, dissection, electrolyte abnormality, NOE, dehydration, car monoxide poisoning. I will evaluate patient with serial high-sensitivity troponin, EKG, blood work, urinalysis. I will also evaluate patient with CT head to rule out ICH as well as CTA chest to rule out acute PE as she does have persistent tachycardia upon arrival in the ED with rate 110 to 120 bpm.    Patient has been observed on cardiac monitor for multiple hours without any alarms for arrhythmia. Serial high-sensitivity troponin negative x2, not consistent with ACS. She has no active chest pain or shortness of breath. CTA chest negative for acute PE or other acute process to explain her syncope and CT head also negative.   She does have some degree of mild NOE, she may be dehydrated causing her syncopal episode, I will treat with IV fluid bolus and continue to reassess. Consider admission for observation for syncope and work-up of her syncope, this was offered to patient and shared medical decision making was performed at bedside with patient and , she does not wish to be admitted at this time and would prefer to follow-up with her PCP as an outpatient. ED Course as of 01/12/23 0834   Wed Jan 11, 2023   1228 EKG per my interpretation sinus tachycardia, rate 115 bpm, normal axis, nonspecific T wave abnormality. [AK]   1540 On reassessment patient is feeling better. No arrhythmia or alarms on cardiac monitor. Still mildly tachycardic 105 to 110 bpm but much improved. Denies any chest pain or shortness of breath, she is feeling well and requesting discharge home. Because of persistent tachycardia I will treat with another 500 cc bolus IV fluids, I will repeat troponin. [AK]   1542 Carboxy-Hgb(!): 2.6  Minimally elevated, just barely above upper limit of normal, likely not high enough to cause syncope. [AK]      ED Course User Index  [AK] Gómez Andrea MD     Chest x-ray per my independent interpretation no acute process such as acute infiltrate or pneumothorax, confirmed by radiologist.        Cardiac Monitoring: The cardiac monitor revealed the following rhythm as interpreted by me: Normal Sinus Rhythm, rate 95 bpm  The cardiac monitor was ordered secondary to the patient's reported complaint of syncope and to monitor the patient for dysrhythmia. Belinda Moreau MD      FINAL IMPRESSION     1. Syncope and collapse    2. Dehydration    3. NOE (acute kidney injury) [facility])          DISPOSITION/PLAN     Discharge Note:  The patient has been re-evaluated and is ready for discharge. Reviewed available results with patient. Counseled patient on diagnosis and care plan. Patient has expressed understanding, and all questions have been answered.  Patient agrees with plan and agrees to follow up as recommended, or to return to the ED if their symptoms worsen. Discharge instructions have been provided and explained to the patient, along with reasons to return to the ED. CLINICAL IMPRESSION    1. Syncope and collapse    2. Dehydration    3. NOE (acute kidney injury) ([facility] [address]         DISPOSITION  Discharge     PATIENT REFERRED TO:  Follow-up Information       Follow up With Specialties Details Why Contact Info    Krystle Bess MD Internal Medicine Physician Schedule an appointment as soon as possible for a visit   [address] [place] [address]  [phone]      \[facility] [place] [facility]\"" EMERGENCY DEPT Emergency Medicine Go to  As needed, If symptoms worsen [address] [place] [address]  [phone]              DISCHARGE MEDICATIONS:  Discharge Medication List as of 1/11/2023  6:29 PM            DISCONTINUED MEDICATIONS:  Discharge Medication List as of 1/11/2023  6:29 PM          I am the Primary Clinician of Record. Lokesh Caruso MD (electronically signed)    (Please note that parts of this dictation were completed with voice recognition software. Quite often unanticipated grammatical, syntax, homophones, and other interpretive errors are inadvertently transcribed by the computer software. Please disregards these errors.  Please excuse any errors that have escaped final proofreading.)

## 2023-01-18 ENCOUNTER — HOSPITAL ENCOUNTER (OUTPATIENT)
Dept: MAMMOGRAPHY | Age: 78
Discharge: HOME | End: 2023-01-18
Attending: INTERNAL MEDICINE
Payer: MEDICARE

## 2023-01-18 DIAGNOSIS — Z12.31 ENCOUNTER FOR SCREENING MAMMOGRAM FOR MALIGNANT NEOPLASM OF BREAST: ICD-10-CM

## 2023-01-18 PROCEDURE — 77067 SCR MAMMO BI INCL CAD: CPT

## 2023-03-17 DIAGNOSIS — I10 ESSENTIAL HYPERTENSION: ICD-10-CM

## 2023-03-17 RX ORDER — FELODIPINE 5 MG/1
TABLET, EXTENDED RELEASE ORAL
Qty: 90 TABLET | Refills: 0 | Status: SHIPPED | OUTPATIENT
Start: 2023-03-17

## 2023-03-17 RX ORDER — TRIAMTERENE AND HYDROCHLOROTHIAZIDE 37.5; 25 MG/1; MG/1
CAPSULE ORAL
Qty: 90 CAPSULE | Refills: 0 | Status: SHIPPED | OUTPATIENT
Start: 2023-03-17

## 2023-05-24 RX ORDER — TRIAMTERENE AND HYDROCHLOROTHIAZIDE 37.5; 25 MG/1; MG/1
1 CAPSULE ORAL DAILY
COMMUNITY
Start: 2023-03-17 | End: 2023-06-08

## 2023-05-24 RX ORDER — FELODIPINE 5 MG/1
1 TABLET, EXTENDED RELEASE ORAL DAILY
COMMUNITY
Start: 2023-03-17 | End: 2023-06-08

## 2023-05-24 RX ORDER — ATORVASTATIN CALCIUM 20 MG/1
1 TABLET, FILM COATED ORAL DAILY
COMMUNITY
Start: 2022-10-31 | End: 2023-06-08

## 2023-05-24 RX ORDER — KETOCONAZOLE 2 G/100G
AEROSOL, FOAM TOPICAL 2 TIMES DAILY
COMMUNITY
Start: 2018-05-01

## 2023-05-24 RX ORDER — FEXOFENADINE HCL 180 MG/1
180 TABLET ORAL DAILY
COMMUNITY

## 2023-05-24 RX ORDER — DORZOLAMIDE HYDROCHLORIDE AND TIMOLOL MALEATE 20; 5 MG/ML; MG/ML
SOLUTION/ DROPS OPHTHALMIC
COMMUNITY
Start: 2015-10-22

## 2023-06-05 ASSESSMENT — ENCOUNTER SYMPTOMS: SHORTNESS OF BREATH: 0

## 2023-06-06 ASSESSMENT — ENCOUNTER SYMPTOMS: SHORTNESS OF BREATH: 0

## 2023-06-07 ENCOUNTER — OFFICE VISIT (OUTPATIENT)
Age: 78
End: 2023-06-07
Payer: COMMERCIAL

## 2023-06-07 ENCOUNTER — HOSPITAL ENCOUNTER (OUTPATIENT)
Facility: HOSPITAL | Age: 78
Discharge: HOME | End: 2023-06-10
Attending: INTERNAL MEDICINE
Payer: COMMERCIAL

## 2023-06-07 VITALS
BODY MASS INDEX: 24.23 KG/M2 | WEIGHT: 154.4 LBS | OXYGEN SATURATION: 97 % | DIASTOLIC BLOOD PRESSURE: 60 MMHG | SYSTOLIC BLOOD PRESSURE: 131 MMHG | HEART RATE: 77 BPM | TEMPERATURE: 98.1 F | RESPIRATION RATE: 14 BRPM | HEIGHT: 67 IN

## 2023-06-07 DIAGNOSIS — R10.32 LLQ PAIN: Primary | ICD-10-CM

## 2023-06-07 DIAGNOSIS — Z11.59 ENCOUNTER FOR HEPATITIS C SCREENING TEST FOR LOW RISK PATIENT: ICD-10-CM

## 2023-06-07 DIAGNOSIS — M54.50 CHRONIC RIGHT-SIDED LOW BACK PAIN WITHOUT SCIATICA: ICD-10-CM

## 2023-06-07 DIAGNOSIS — R73.01 IFG (IMPAIRED FASTING GLUCOSE): ICD-10-CM

## 2023-06-07 DIAGNOSIS — F51.01 PRIMARY INSOMNIA: ICD-10-CM

## 2023-06-07 DIAGNOSIS — Z00.00 MEDICARE ANNUAL WELLNESS VISIT, SUBSEQUENT: ICD-10-CM

## 2023-06-07 DIAGNOSIS — I10 PRIMARY HYPERTENSION: ICD-10-CM

## 2023-06-07 DIAGNOSIS — G89.29 CHRONIC RIGHT-SIDED LOW BACK PAIN WITHOUT SCIATICA: ICD-10-CM

## 2023-06-07 DIAGNOSIS — E78.2 MIXED HYPERLIPIDEMIA: ICD-10-CM

## 2023-06-07 DIAGNOSIS — I25.10 CORONARY ARTERY DISEASE INVOLVING NATIVE CORONARY ARTERY OF NATIVE HEART WITHOUT ANGINA PECTORIS: ICD-10-CM

## 2023-06-07 DIAGNOSIS — K21.9 GASTROESOPHAGEAL REFLUX DISEASE WITHOUT ESOPHAGITIS: ICD-10-CM

## 2023-06-07 DIAGNOSIS — R10.32 LLQ PAIN: ICD-10-CM

## 2023-06-07 LAB
ALBUMIN SERPL-MCNC: 3.9 G/DL (ref 3.5–5)
ALBUMIN/GLOB SERPL: 1.1 (ref 1.1–2.2)
ALP SERPL-CCNC: 75 U/L (ref 45–117)
ALT SERPL-CCNC: 31 U/L (ref 12–78)
ANION GAP SERPL CALC-SCNC: 6 MMOL/L (ref 5–15)
AST SERPL-CCNC: 18 U/L (ref 15–37)
BILIRUB SERPL-MCNC: 0.4 MG/DL (ref 0.2–1)
BUN SERPL-MCNC: 16 MG/DL (ref 6–20)
BUN/CREAT SERPL: 15 (ref 12–20)
CALCIUM SERPL-MCNC: 9.3 MG/DL (ref 8.5–10.1)
CHLORIDE SERPL-SCNC: 105 MMOL/L (ref 97–108)
CO2 SERPL-SCNC: 30 MMOL/L (ref 21–32)
CREAT BLD-MCNC: 1.1 MG/DL (ref 0.6–1.3)
CREAT SERPL-MCNC: 1.05 MG/DL (ref 0.55–1.02)
ERYTHROCYTE [DISTWIDTH] IN BLOOD BY AUTOMATED COUNT: 14.9 % (ref 11.5–14.5)
EST. AVERAGE GLUCOSE BLD GHB EST-MCNC: 120 MG/DL
GLOBULIN SER CALC-MCNC: 3.7 G/DL (ref 2–4)
GLUCOSE SERPL-MCNC: 107 MG/DL (ref 65–100)
HBA1C MFR BLD: 5.8 % (ref 4–5.6)
HCT VFR BLD AUTO: 40.4 % (ref 35–47)
HGB BLD-MCNC: 12.1 G/DL (ref 11.5–16)
MCH RBC QN AUTO: 22.4 PG (ref 26–34)
MCHC RBC AUTO-ENTMCNC: 30 G/DL (ref 30–36.5)
MCV RBC AUTO: 74.8 FL (ref 80–99)
NRBC # BLD: 0 K/UL (ref 0–0.01)
NRBC BLD-RTO: 0 PER 100 WBC
PLATELET # BLD AUTO: 284 K/UL (ref 150–400)
PMV BLD AUTO: 10.8 FL (ref 8.9–12.9)
POTASSIUM SERPL-SCNC: 3.3 MMOL/L (ref 3.5–5.1)
PROT SERPL-MCNC: 7.6 G/DL (ref 6.4–8.2)
RBC # BLD AUTO: 5.4 M/UL (ref 3.8–5.2)
SODIUM SERPL-SCNC: 141 MMOL/L (ref 136–145)
WBC # BLD AUTO: 6.6 K/UL (ref 3.6–11)

## 2023-06-07 PROCEDURE — 1123F ACP DISCUSS/DSCN MKR DOCD: CPT | Performed by: INTERNAL MEDICINE

## 2023-06-07 PROCEDURE — 82565 ASSAY OF CREATININE: CPT

## 2023-06-07 PROCEDURE — 6360000004 HC RX CONTRAST MEDICATION: Performed by: INTERNAL MEDICINE

## 2023-06-07 PROCEDURE — G0439 PPPS, SUBSEQ VISIT: HCPCS | Performed by: INTERNAL MEDICINE

## 2023-06-07 PROCEDURE — 3075F SYST BP GE 130 - 139MM HG: CPT | Performed by: INTERNAL MEDICINE

## 2023-06-07 PROCEDURE — 74177 CT ABD & PELVIS W/CONTRAST: CPT

## 2023-06-07 PROCEDURE — 3078F DIAST BP <80 MM HG: CPT | Performed by: INTERNAL MEDICINE

## 2023-06-07 PROCEDURE — 99214 OFFICE O/P EST MOD 30 MIN: CPT | Performed by: INTERNAL MEDICINE

## 2023-06-07 RX ORDER — ASPIRIN 81 MG/1
81 TABLET ORAL DAILY
COMMUNITY

## 2023-06-07 RX ADMIN — IOPAMIDOL 100 ML: 755 INJECTION, SOLUTION INTRAVENOUS at 15:04

## 2023-06-07 SDOH — ECONOMIC STABILITY: FOOD INSECURITY: WITHIN THE PAST 12 MONTHS, YOU WORRIED THAT YOUR FOOD WOULD RUN OUT BEFORE YOU GOT MONEY TO BUY MORE.: NEVER TRUE

## 2023-06-07 SDOH — ECONOMIC STABILITY: INCOME INSECURITY: HOW HARD IS IT FOR YOU TO PAY FOR THE VERY BASICS LIKE FOOD, HOUSING, MEDICAL CARE, AND HEATING?: NOT HARD AT ALL

## 2023-06-07 SDOH — ECONOMIC STABILITY: HOUSING INSECURITY
IN THE LAST 12 MONTHS, WAS THERE A TIME WHEN YOU DID NOT HAVE A STEADY PLACE TO SLEEP OR SLEPT IN A SHELTER (INCLUDING NOW)?: NO

## 2023-06-07 SDOH — ECONOMIC STABILITY: FOOD INSECURITY: WITHIN THE PAST 12 MONTHS, THE FOOD YOU BOUGHT JUST DIDN'T LAST AND YOU DIDN'T HAVE MONEY TO GET MORE.: NEVER TRUE

## 2023-06-07 ASSESSMENT — PATIENT HEALTH QUESTIONNAIRE - PHQ9
SUM OF ALL RESPONSES TO PHQ QUESTIONS 1-9: 0
2. FEELING DOWN, DEPRESSED OR HOPELESS: 0
1. LITTLE INTEREST OR PLEASURE IN DOING THINGS: 0
SUM OF ALL RESPONSES TO PHQ9 QUESTIONS 1 & 2: 0
SUM OF ALL RESPONSES TO PHQ QUESTIONS 1-9: 0

## 2023-06-07 ASSESSMENT — LIFESTYLE VARIABLES
HOW MANY STANDARD DRINKS CONTAINING ALCOHOL DO YOU HAVE ON A TYPICAL DAY: 1 OR 2
HOW OFTEN DO YOU HAVE A DRINK CONTAINING ALCOHOL: MONTHLY OR LESS

## 2023-06-07 ASSESSMENT — ENCOUNTER SYMPTOMS: BACK PAIN: 1

## 2023-06-07 NOTE — PATIENT INSTRUCTIONS
that may run in your family, and various assessments and screenings as appropriate. After reviewing your medical record and screening and assessments performed today your provider may have ordered immunizations, labs, imaging, and/or referrals for you. A list of these orders (if applicable) as well as your Preventive Care list are included within your After Visit Summary for your review. Other Preventive Recommendations:    A preventive eye exam performed by an eye specialist is recommended every 1-2 years to screen for glaucoma; cataracts, macular degeneration, and other eye disorders. A preventive dental visit is recommended every 6 months. Try to get at least 150 minutes of exercise per week or 10,000 steps per day on a pedometer . Order or download the FREE \"Exercise & Physical Activity: Your Everyday Guide\" from [employer]. Call 4-925.732.3952 or search [employer] online. You need 4685-6367 mg of calcium and 2075-5192 IU of vitamin D per day. It is possible to meet your calcium requirement with diet alone, but a vitamin D supplement is usually necessary to meet this goal.  When exposed to the sun, use a sunscreen that protects against both UVA and UVB radiation with an SPF of 30 or greater. Reapply every 2 to 3 hours or after sweating, drying off with a towel, or swimming. Always wear a seat belt when traveling in a car. Always wear a helmet when riding a bicycle or motorcycle.

## 2023-06-08 DIAGNOSIS — E87.6 LOW BLOOD POTASSIUM: Primary | ICD-10-CM

## 2023-06-08 DIAGNOSIS — K57.90 DIVERTICULAR DISEASE: Primary | ICD-10-CM

## 2023-06-08 LAB
HCV AB SERPL QL IA: NONREACTIVE
TSH SERPL DL<=0.05 MIU/L-ACNC: 0.81 UIU/ML (ref 0.36–3.74)

## 2023-06-08 RX ORDER — TRIAMTERENE AND HYDROCHLOROTHIAZIDE 37.5; 25 MG/1; MG/1
CAPSULE ORAL
Qty: 90 CAPSULE | Refills: 0 | Status: SHIPPED | OUTPATIENT
Start: 2023-06-08

## 2023-06-08 RX ORDER — ATORVASTATIN CALCIUM 20 MG/1
TABLET, FILM COATED ORAL
Qty: 90 TABLET | Refills: 1 | Status: SHIPPED | OUTPATIENT
Start: 2023-06-08

## 2023-06-08 RX ORDER — FELODIPINE 5 MG/1
TABLET, EXTENDED RELEASE ORAL
Qty: 90 TABLET | Refills: 0 | Status: SHIPPED | OUTPATIENT
Start: 2023-06-08

## 2023-06-19 ENCOUNTER — HOSPITAL ENCOUNTER (OUTPATIENT)
Facility: HOSPITAL | Age: 78
Setting detail: RECURRING SERIES
Discharge: HOME | End: 2023-06-22
Payer: COMMERCIAL

## 2023-06-19 PROCEDURE — 97110 THERAPEUTIC EXERCISES: CPT

## 2023-06-19 NOTE — PROGRESS NOTES
remaining functional goals. NT   min [x] Estim Unattended,             type/location: lumbar       []  w/ice    [x]  w/heat        min [] Estim Attended,             type/location:       []  w/ice   []  w/heat         []  w/US   []  TENS insruct            min []  Mechanical Traction,        type/lbs:        []  pro      []  sup           []  int       []  cont            []  before manual           []  after manual     min []  Ultrasound,         settings/location:      min  unbilled []  Ice     []  Heat            location/position:         min []  Vasopneumatic Device,      press/temp:   pre-treatment girth :    post-treatment girth :    measured at (landmark       location) : If using vaso (only need to measure limb vaso being performed on)        min []  Other:      Skin assessment post-treatment (if applicable):    [x]  intact    []  redness- no adverse reaction                 []redness - adverse reaction:          [x]  Patient Education billed concurrently with other procedures   [x] Review HEP    [] Progressed/Changed HEP, detail:    [] Other detail:         Other Objective/Functional Measures  None noted    Pain Level at end of session (0-10 scale): 0      Assessment   The patient progressed with new therex as tolerated. Patient will continue to benefit from skilled PT / OT services to modify and progress therapeutic interventions, analyze and address functional mobility deficits, analyze and address ROM deficits, analyze and address strength deficits, analyze and address soft tissue restrictions, analyze and cue for proper movement patterns, analyze and modify for postural abnormalities, analyze and address imbalance/dizziness, and instruct in home and community integration to address functional deficits and attain remaining goals. Progress toward goals / Updated goals:  []  See Progress Note/Recertification    The patient is progressing towards goals.        PLAN  Yes  Continue plan of

## 2023-06-21 ENCOUNTER — HOSPITAL ENCOUNTER (OUTPATIENT)
Facility: HOSPITAL | Age: 78
Setting detail: RECURRING SERIES
Discharge: HOME | End: 2023-06-24
Payer: COMMERCIAL

## 2023-06-21 PROCEDURE — 97110 THERAPEUTIC EXERCISES: CPT

## 2023-06-21 NOTE — PROGRESS NOTES
Name:  Gunner Browne  MRN:  [id number]  YOB: 1960  Date of Surgery:  May 17, 2018      Pre-operative Diagnosis: left frontal skull lesion  Post-operative Diagnosis: left frontal skull lesion  Procedure:  1) Neuronavigation and microdissection  2) Left frontal craniectomy for epidural mass resection  3) Reconstruction of left frontal crainectomy defect (> 5cm)    Indication: 54 M s/p right frontal oligodendroglioma resection in 2001 with a second resection that took place in 2006. The patient has been in remission since. In recent surveillance MRIs, a left frontal lesion was noted, with enlargement on serial scans. The patient presents for resection of this lesion    [facility]    Surgeon: Raza Etienne MD, PhD  Assistant:  Marko Pelayo MD    Description of Procedure: After pre-operative laboratory value and informed consent were verified, the patient was brought into the operating room. The patient underwent general anesthesia and intubation. Antibiotic was administered.    The patient was placed in a supine position, with the head in a neutral position, exposing the frontal cranium.  The [employer] Stealth reference frame was placed. The patient's anatomy was registered relative to the pre-operative MRI using the [employer] system.    The region overlying the lesion was identified. Based on neuro-navigation, the lesion can be accessed by extending the previous incision.  The area encompassing the madhu incision was prepped and draped in a sterile manner.     Time out was performed. The incision was opened with a 10 blade. Hemostasis was achieved using cautery. The incision was retracted using a cerebellar retractor.  The skin flap was dissected free from the underlying skull. Upon identification of the previous craniotomy site, a region of bony defect > 5 cm was identified. The bony defect was filled with an abnormal soft tissue. The soft tissue was biopsied and sent to pathology for  frozen path. 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    The extent of the tumor was confirmed using the Stealth probe. A Linda hole was placed lateral to the bony defect.  The region of the bone overlying the superior sagittal sinus was identified. No surgical entry into this region occurred for the remainder of the procedure.    The tumor was carefully dissected from from the bony edges as well as the dura under microsopic magnification. The tumor did not transgress the dura.    The region of the bone encroached by the tumor was removed using a B1-drill bit, connecting bony defect to the Linda hole.  The dura was then tented to the bony edge using 4'0 suture.    The portion of the tumor overlying the superior sagittal sinus was left in place.      After hemostasis, I turned my attention to the closure.  The craniectomy site (>5 cm) was reconstructed with a titanium mesh. The wound was amply irrigated with bacitracin containing saline. The galea was closed with 2'0 vicryl. The skin was closed with 3'0 Monocryl. Dermabond was applied.    I was present and performed the key portions of the procedure.    EBL: 10 cc's.    Specimens: Resected tumor specimen.    Disposition: ICU     remaining functional goals. NT   min [x] Estim Unattended,             type/location: lumbar       []  w/ice    [x]  w/heat        min [] Estim Attended,             type/location:       []  w/ice   []  w/heat         []  w/US   []  TENS insruct            min []  Mechanical Traction,        type/lbs:        []  pro      []  sup           []  int       []  cont            []  before manual           []  after manual     min []  Ultrasound,         settings/location:      min  unbilled []  Ice     []  Heat            location/position:         min []  Vasopneumatic Device,      press/temp:   pre-treatment girth :    post-treatment girth :    measured at (landmark       location) : If using vaso (only need to measure limb vaso being performed on)        min []  Other:      Skin assessment post-treatment (if applicable):    [x]  intact    []  redness- no adverse reaction                 []redness - adverse reaction:          [x]  Patient Education billed concurrently with other procedures   [x] Review HEP    [] Progressed/Changed HEP, detail:    [] Other detail:         Other Objective/Functional Measures  None noted    Pain Level at end of session (0-10 scale): 0      Assessment   The patient progressed with strengthening exercises as tolerated. Patient will continue to benefit from skilled PT / OT services to modify and progress therapeutic interventions, analyze and address functional mobility deficits, analyze and address ROM deficits, analyze and address strength deficits, analyze and address soft tissue restrictions, analyze and cue for proper movement patterns, analyze and modify for postural abnormalities, analyze and address imbalance/dizziness, and instruct in home and community integration to address functional deficits and attain remaining goals. Progress toward goals / Updated goals:  []  See Progress Note/Recertification    The patient is progressing towards goals.        PLAN  Yes  Continue plan

## 2023-06-26 ENCOUNTER — HOSPITAL ENCOUNTER (OUTPATIENT)
Facility: HOSPITAL | Age: 78
Setting detail: RECURRING SERIES
Discharge: HOME | End: 2023-06-29
Payer: COMMERCIAL

## 2023-06-26 PROCEDURE — 97110 THERAPEUTIC EXERCISES: CPT

## 2023-06-28 ENCOUNTER — HOSPITAL ENCOUNTER (OUTPATIENT)
Facility: HOSPITAL | Age: 78
Setting detail: RECURRING SERIES
Discharge: HOME | End: 2023-07-01
Payer: COMMERCIAL

## 2023-06-28 PROCEDURE — 97110 THERAPEUTIC EXERCISES: CPT

## 2023-07-03 ENCOUNTER — HOSPITAL ENCOUNTER (OUTPATIENT)
Facility: HOSPITAL | Age: 78
Setting detail: RECURRING SERIES
Discharge: HOME | End: 2023-07-06
Payer: COMMERCIAL

## 2023-07-03 PROCEDURE — 97110 THERAPEUTIC EXERCISES: CPT

## 2023-07-03 NOTE — PROGRESS NOTES
PHYSICAL THERAPY - MEDICARE DAILY TREATMENT NOTE (updated 3/23)      Date: 7/3/2023          Patient Name:  Roberts Siemens :  1945   Medical   Diagnosis:  Chronic right-sided low back pain without sciatica [M54.50, G89.29] Treatment Diagnosis:  M54.59, G89.29  CHRONIC LOWER BACK PAIN    Referral Source:  Wu Campbell MD Insurance:   Payor: [employer] / Plan: [employer] / Product Type: *No Product type* /                     Patient  verified yes     Visit #   Current  / Total 6 24   Time   In / Out 931 1020   Total Treatment Time 49   Total Timed Codes 49   1:1 Treatment Time 52      [facility] Totals Reminder:  bill using total billable   min of TIMED therapeutic procedures and modalities. 8-22 min = 1 unit; 23-37 min = 2 units; 38-52 min = 3 units; 53-67 min = 4 units; 68-82 min = 5 units            SUBJECTIVE    Pain Level (0-10 scale): 0    Any medication changes, allergies to medications, adverse drug reactions, diagnosis change, or new procedure performed?: [x] No    [] Yes (see summary sheet for update)  Medications: Verified on Patient Summary List    Subjective functional status/changes: The patient noted they were feeling pretty good today, noted they did well over the weekend as well. OBJECTIVE      Therapeutic Procedures: Tx Min Billable or 1:1 Min (if diff from Tx Min) Procedure, Rationale, Specifics   [id number] [phone] [id number] Therapeutic Exercise (timed):  increase ROM, strength, coordination, balance, and proprioception to improve patient's ability to progress to PLOF and address remaining functional goals.  (see flow sheet as applicable)     Details if applicable:            Details if applicable:           Details if applicable:           Details if applicable:            Details if applicable:     49 49    Total Total         Modalities Rationale:     decrease edema, decrease inflammation, decrease pain, and increase tissue extensibility to improve patient's ability to progress to PLOF and

## 2023-07-05 ENCOUNTER — HOSPITAL ENCOUNTER (OUTPATIENT)
Facility: HOSPITAL | Age: 78
Setting detail: RECURRING SERIES
Discharge: HOME | End: 2023-07-08
Payer: COMMERCIAL

## 2023-07-05 PROCEDURE — 97110 THERAPEUTIC EXERCISES: CPT

## 2023-07-05 NOTE — PROGRESS NOTES
PHYSICAL THERAPY - MEDICARE DAILY TREATMENT NOTE (updated 3/23)      Date: 2023          Patient Name:  Prosper Sorto :  1945   Medical   Diagnosis:  Chronic right-sided low back pain without sciatica [M54.50, G89.29] Treatment Diagnosis:  M54.59, G89.29  CHRONIC LOWER BACK PAIN    Referral Source:  Nighat Isaac MD Insurance:   Payor: [employer] / Plan: I Just Shared / Product Type: *No Product type* /                     Patient  verified yes     Visit #   Current  / Total 7 24   Time   In / Out 9:37am 10:25am   Total Treatment Time 48   Total Timed Codes 48   1:1 Treatment Time 35      [facility] Totals Reminder:  bill using total billable   min of TIMED therapeutic procedures and modalities. 8-22 min = 1 unit; 23-37 min = 2 units; 38-52 min = 3 units; 53-67 min = 4 units; 68-82 min = 5 units            SUBJECTIVE    Pain Level (0-10 scale): 0    Any medication changes, allergies to medications, adverse drug reactions, diagnosis change, or new procedure performed?: [x] No    [] Yes (see summary sheet for update)  Medications: Verified on Patient Summary List    Subjective functional status/changes: The patient reports that she had one day (yesterday) where the back was a little extra sore/irritating, but didn't do anything that might've bothered it. She does feel like the back is getting better overall. She will have groin discomfort bilaterally when doing some of the exercises, but it subsides quickly. OBJECTIVE      Therapeutic Procedures: Tx Min Billable or 1:1 Min (if diff from Tx Min) Procedure, Rationale, Specifics   48 [phone] [id number] Therapeutic Exercise (timed):  increase ROM, strength, coordination, balance, and proprioception to improve patient's ability to progress to PLOF and address remaining functional goals.  (see flow sheet as applicable)     Details if applicable:            Details if applicable:           Details if applicable:           Details if applicable:            Details if

## 2023-07-10 ENCOUNTER — APPOINTMENT (OUTPATIENT)
Facility: HOSPITAL | Age: 78
End: 2023-07-10
Payer: COMMERCIAL

## 2023-07-12 ENCOUNTER — APPOINTMENT (OUTPATIENT)
Facility: HOSPITAL | Age: 78
End: 2023-07-12
Payer: COMMERCIAL

## 2023-07-17 ENCOUNTER — HOSPITAL ENCOUNTER (OUTPATIENT)
Facility: HOSPITAL | Age: 78
Setting detail: RECURRING SERIES
Discharge: HOME | End: 2023-07-20
Payer: COMMERCIAL

## 2023-07-17 PROCEDURE — 97110 THERAPEUTIC EXERCISES: CPT

## 2023-07-17 NOTE — TELEPHONE ENCOUNTER
Patient needs a new script/refill for Klor-Con M20 and wants it sent to [facility] / [facility].

## 2023-07-17 NOTE — TELEPHONE ENCOUNTER
Klor syed not in medications nor in connect care. Called patient to clarify, spoke with ,  states that he will have pt return call.

## 2023-07-17 NOTE — TELEPHONE ENCOUNTER
Patient returned call, patient states it was discussed during last visit that she still had klor con left over from last time because she quit taking it for a while. Patient state she just needs refill now.  PCP: Corinne Meek, MD    Last appt: [unfilled]  Future Appointments   Date Time Provider [address]   12/12/2023  8:45 AM Lyn Sutherland MD [facility] AMB       Requested Prescriptions     Pending Prescriptions Disp Refills    potassium chloride (KLOR-CON M) 20 MEQ extended release tablet 60 tablet 5     Sig: Take 1 tablet by mouth 2 times daily

## 2023-07-18 RX ORDER — POTASSIUM CHLORIDE 20 MEQ/1
20 TABLET, EXTENDED RELEASE ORAL DAILY
Qty: 90 TABLET | Refills: 0 | Status: SHIPPED | OUTPATIENT
Start: 2023-07-18

## 2023-07-18 NOTE — TELEPHONE ENCOUNTER
Called patient, verified two patient identifiers. Informed pt potassium was sent into pharmacy but does need to have lab for this as well   Patient states she will be out this way tomorrow 07/19 and will come in to have it drawn.

## 2023-07-19 ENCOUNTER — APPOINTMENT (OUTPATIENT)
Facility: HOSPITAL | Age: 78
End: 2023-07-19
Payer: COMMERCIAL

## 2023-07-19 ENCOUNTER — NURSE ONLY (OUTPATIENT)
Age: 78
End: 2023-07-19

## 2023-07-19 DIAGNOSIS — E87.6 LOW BLOOD POTASSIUM: ICD-10-CM

## 2023-07-20 LAB
ANION GAP SERPL CALC-SCNC: 4 MMOL/L (ref 5–15)
BUN SERPL-MCNC: 14 MG/DL (ref 6–20)
BUN/CREAT SERPL: 13 (ref 12–20)
CALCIUM SERPL-MCNC: 9.6 MG/DL (ref 8.5–10.1)
CHLORIDE SERPL-SCNC: 110 MMOL/L (ref 97–108)
CO2 SERPL-SCNC: 28 MMOL/L (ref 21–32)
CREAT SERPL-MCNC: 1.04 MG/DL (ref 0.55–1.02)
GLUCOSE SERPL-MCNC: 101 MG/DL (ref 65–100)
POTASSIUM SERPL-SCNC: 3.8 MMOL/L (ref 3.5–5.1)
SODIUM SERPL-SCNC: 142 MMOL/L (ref 136–145)

## 2023-07-24 ENCOUNTER — APPOINTMENT (OUTPATIENT)
Facility: HOSPITAL | Age: 78
End: 2023-07-24
Payer: COMMERCIAL

## 2023-07-26 ENCOUNTER — APPOINTMENT (OUTPATIENT)
Facility: HOSPITAL | Age: 78
End: 2023-07-26
Payer: COMMERCIAL

## 2023-09-13 RX ORDER — TRIAMTERENE AND HYDROCHLOROTHIAZIDE 37.5; 25 MG/1; MG/1
CAPSULE ORAL
Qty: 90 CAPSULE | Refills: 0 | Status: SHIPPED | OUTPATIENT
Start: 2023-09-13

## 2023-09-13 RX ORDER — FELODIPINE 5 MG/1
TABLET, EXTENDED RELEASE ORAL
Qty: 90 TABLET | Refills: 0 | Status: SHIPPED | OUTPATIENT
Start: 2023-09-13

## 2023-09-15 ENCOUNTER — TELEPHONE (OUTPATIENT)
Age: 78
End: 2023-09-15

## 2023-09-15 NOTE — TELEPHONE ENCOUNTER
Patient called in stating Jet Marx can cancel the med refill request from 9/13 from [facility] pharmacy.  She states she has plenty of medication and is not in need of a refill.     triamterene-hydroCHLOROthiazide (DYAZIDE) 37.5-25 MG per capsule    felodipine (PLENDIL) 5 MG extended release tablet

## 2023-09-20 ENCOUNTER — HOSPITAL ENCOUNTER (OUTPATIENT)
Facility: HOSPITAL | Age: 78
Discharge: HOME | End: 2023-09-23
Payer: COMMERCIAL

## 2023-09-20 VITALS
DIASTOLIC BLOOD PRESSURE: 58 MMHG | HEART RATE: 93 BPM | TEMPERATURE: 97.7 F | RESPIRATION RATE: 14 BRPM | WEIGHT: 147.49 LBS | HEIGHT: 65 IN | OXYGEN SATURATION: 98 % | SYSTOLIC BLOOD PRESSURE: 105 MMHG | BODY MASS INDEX: 24.57 KG/M2

## 2023-09-20 LAB
ABO + RH BLD: NORMAL
ALBUMIN SERPL-MCNC: 3.9 G/DL (ref 3.5–5)
ALBUMIN/GLOB SERPL: 0.9 (ref 1.1–2.2)
ALP SERPL-CCNC: 77 U/L (ref 45–117)
ALT SERPL-CCNC: 34 U/L (ref 12–78)
ANION GAP SERPL CALC-SCNC: 6 MMOL/L (ref 5–15)
APPEARANCE UR: CLEAR
APTT PPP: 24.6 SEC (ref 22.1–31)
AST SERPL-CCNC: 20 U/L (ref 15–37)
BACTERIA URNS QL MICRO: NEGATIVE /HPF
BILIRUB SERPL-MCNC: 0.5 MG/DL (ref 0.2–1)
BILIRUB UR QL: NEGATIVE
BLOOD GROUP ANTIBODIES SERPL: NORMAL
BUN SERPL-MCNC: 20 MG/DL (ref 6–20)
BUN/CREAT SERPL: 16 (ref 12–20)
CALCIUM SERPL-MCNC: 9.4 MG/DL (ref 8.5–10.1)
CHLORIDE SERPL-SCNC: 105 MMOL/L (ref 97–108)
CO2 SERPL-SCNC: 27 MMOL/L (ref 21–32)
COLOR UR: ABNORMAL
CREAT SERPL-MCNC: 1.23 MG/DL (ref 0.55–1.02)
EPITH CASTS URNS QL MICRO: ABNORMAL /LPF
ERYTHROCYTE [DISTWIDTH] IN BLOOD BY AUTOMATED COUNT: 15.2 % (ref 11.5–14.5)
EST. AVERAGE GLUCOSE BLD GHB EST-MCNC: 120 MG/DL
GLOBULIN SER CALC-MCNC: 4.4 G/DL (ref 2–4)
GLUCOSE SERPL-MCNC: 92 MG/DL (ref 65–100)
GLUCOSE UR STRIP.AUTO-MCNC: NEGATIVE MG/DL
HBA1C MFR BLD: 5.8 % (ref 4–5.6)
HCT VFR BLD AUTO: 41.4 % (ref 35–47)
HGB BLD-MCNC: 13 G/DL (ref 11.5–16)
HGB UR QL STRIP: NEGATIVE
HYALINE CASTS URNS QL MICRO: ABNORMAL /LPF (ref 0–2)
INR PPP: 1 (ref 0.9–1.1)
KETONES UR QL STRIP.AUTO: NEGATIVE MG/DL
LEUKOCYTE ESTERASE UR QL STRIP.AUTO: NEGATIVE
MAGNESIUM SERPL-MCNC: 2.1 MG/DL (ref 1.6–2.4)
MCH RBC QN AUTO: 22.9 PG (ref 26–34)
MCHC RBC AUTO-ENTMCNC: 31.4 G/DL (ref 30–36.5)
MCV RBC AUTO: 72.9 FL (ref 80–99)
NITRITE UR QL STRIP.AUTO: NEGATIVE
NRBC # BLD: 0 K/UL (ref 0–0.01)
NRBC BLD-RTO: 0 PER 100 WBC
PH UR STRIP: 7.5 (ref 5–8)
PHOSPHATE SERPL-MCNC: 2.1 MG/DL (ref 2.6–4.7)
PLATELET # BLD AUTO: 290 K/UL (ref 150–400)
PMV BLD AUTO: 10.1 FL (ref 8.9–12.9)
POTASSIUM SERPL-SCNC: 3.3 MMOL/L (ref 3.5–5.1)
PROT SERPL-MCNC: 8.3 G/DL (ref 6.4–8.2)
PROT UR STRIP-MCNC: NEGATIVE MG/DL
PROTHROMBIN TIME: 10.8 SEC (ref 9–11.1)
RBC # BLD AUTO: 5.68 M/UL (ref 3.8–5.2)
RBC #/AREA URNS HPF: ABNORMAL /HPF (ref 0–5)
SODIUM SERPL-SCNC: 138 MMOL/L (ref 136–145)
SP GR UR REFRACTOMETRY: <1.005
SPECIMEN EXP DATE BLD: NORMAL
THERAPEUTIC RANGE: NORMAL SECS (ref 58–77)
URINE CULTURE IF INDICATED: ABNORMAL
UROBILINOGEN UR QL STRIP.AUTO: 0.2 EU/DL (ref 0.2–1)
WBC # BLD AUTO: 6.6 K/UL (ref 3.6–11)
WBC URNS QL MICRO: ABNORMAL /HPF (ref 0–4)

## 2023-09-20 PROCEDURE — 81001 URINALYSIS AUTO W/SCOPE: CPT

## 2023-09-20 PROCEDURE — 80053 COMPREHEN METABOLIC PANEL: CPT

## 2023-09-20 PROCEDURE — 85610 PROTHROMBIN TIME: CPT

## 2023-09-20 PROCEDURE — 84100 ASSAY OF PHOSPHORUS: CPT

## 2023-09-20 PROCEDURE — 86900 BLOOD TYPING SEROLOGIC ABO: CPT

## 2023-09-20 PROCEDURE — 86850 RBC ANTIBODY SCREEN: CPT

## 2023-09-20 PROCEDURE — 83735 ASSAY OF MAGNESIUM: CPT

## 2023-09-20 PROCEDURE — 85730 THROMBOPLASTIN TIME PARTIAL: CPT

## 2023-09-20 PROCEDURE — 85027 COMPLETE CBC AUTOMATED: CPT

## 2023-09-20 PROCEDURE — 86901 BLOOD TYPING SEROLOGIC RH(D): CPT

## 2023-09-20 PROCEDURE — 36415 COLL VENOUS BLD VENIPUNCTURE: CPT

## 2023-09-20 PROCEDURE — 83036 HEMOGLOBIN GLYCOSYLATED A1C: CPT

## 2023-09-20 PROCEDURE — NSP99 NSP99 NON-BILLABLE CODE: Performed by: NURSE PRACTITIONER

## 2023-09-20 RX ORDER — SODIUM CHLORIDE, SODIUM LACTATE, POTASSIUM CHLORIDE, CALCIUM CHLORIDE 600; 310; 30; 20 MG/100ML; MG/100ML; MG/100ML; MG/100ML
INJECTION, SOLUTION INTRAVENOUS ONCE
OUTPATIENT
Start: 2023-09-26

## 2023-09-20 RX ORDER — CELECOXIB 200 MG/1
200 CAPSULE ORAL ONCE
OUTPATIENT
Start: 2023-09-26

## 2023-09-20 RX ORDER — ACETAMINOPHEN 500 MG
1000 TABLET ORAL ONCE
OUTPATIENT
Start: 2023-09-20

## 2023-09-20 RX ORDER — CETIRIZINE HYDROCHLORIDE 10 MG/1
10 TABLET ORAL DAILY
COMMUNITY

## 2023-09-20 RX ORDER — M-VIT,TX,IRON,MINS/CALC/FOLIC 27MG-0.4MG
1 TABLET ORAL DAILY
COMMUNITY

## 2023-09-20 NOTE — PERIOP NOTE
Start ensure surgery immuno-nutrition shakes on 9/20/23 twice a day through 9/24/23. On 9/25/23 eat a regular breakfast   On 9/25/23 start clear liquid diet at 12 noon   Begin bowel prep at 12 noon the day before surgery and follow instructions per surgeon  Take metoclopramide at 6 am, 12 noon, and 5 pm the day before surgery  Take metronidazole at 8 am, 9 am, and 5 pm the day before surgery  Take neomycin at 8 am, 9 am, and 5 pm the day before surgery   Take presurgery drink one bottle the night before surgery before midnight and drink the second bottle 1 hour before time of arrival to the hospital   Start incentive spirimetry on 9/20/23 and practice twice daily- 10 times each and bring incentive spirometer with you the day of surgery.

## 2023-09-20 NOTE — PERIOP NOTE
The [employer] (ERAS) book was reviewed with the patient during the pre-admission testing (PAT) appointment. An opportunity for questions was provided, patient verbalized understanding. PAT labs reviewed. K+ 3.3. Patient is prescribed K+ 20 Meq daily but had not been taking. Discussed with Kam Marie NP. Pt instructed to take K+ as prescribed and incorporate potassium rich foods in her diet. Patient verbalized understanding and was aware of importance of continuing to take K+. No further questions at this time.

## 2023-09-20 NOTE — PERIOP NOTE

## 2023-09-20 NOTE — PERIOP NOTE

## 2023-09-21 PROBLEM — Z01.818 ENCOUNTER FOR PREADMISSION TESTING: Status: ACTIVE | Noted: 2023-09-21

## 2023-09-21 LAB
BACTERIA SPEC CULT: NORMAL
BACTERIA SPEC CULT: NORMAL
SERVICE CMNT-IMP: NORMAL

## 2023-09-21 NOTE — PERIOP NOTE
9/20/23 K+ 3.3 in PAT assessment. Pt has been prescribed potassium supplementation but has not been consistently compliant w/ medication as directed.   Pt advised to take medication as directed and states she will remain compliant as directed      Latest Reference Range & Units 09/20/23 08:38   Sodium 136 - 145 mmol/L 138   Potassium 3.5 - 5.1 mmol/L 3.3 (L)   Chloride 97 - 108 mmol/L 105   CO2 21 - 32 mmol/L 27   BUN,BUNPL 6 - 20 MG/DL 20   Creatinine 0.55 - 1.02 MG/DL 1.23 (H)   Bun/Cre Ratio 12 - 20   16   Anion Gap 5 - 15 mmol/L 6   Est, Glom Filt Rate >60 ml/min/1.73m2 45 (L)   Magnesium 1.6 - 2.4 mg/dL 2.1   Glucose, Random 65 - 100 mg/dL 92   CALCIUM, SERUM, 286163 8.5 - 10.1 MG/DL 9.4   ALBUMIN/GLOBULIN RATIO 1.1 - 2.2   0.9 (L)   Phosphorus 2.6 - 4.7 MG/DL 2.1 (L)   Total Protein 6.4 - 8.2 g/dL 8.3 (H)   Albumin 3.5 - 5.0 g/dL 3.9   Globulin 2.0 - 4.0 g/dL 4.4 (H)   Alk Phos 45 - 117 U/L 77   ALT 12 - 78 U/L 34   AST 15 - 37 U/L 20   BILIRUBIN TOTAL 0.2 - 1.0 MG/DL 0.5   Hemoglobin A1C 4.0 - 5.6 % 5.8 (H)   eAG (mg/dL) mg/dL 120   (L): Data is abnormally low  (H): Data is abnormally high

## 2023-09-25 ENCOUNTER — ANESTHESIA EVENT (OUTPATIENT)
Facility: HOSPITAL | Age: 78
DRG: 330 | End: 2023-09-25
Payer: MEDICARE

## 2023-09-25 NOTE — ANESTHESIA PRE PROCEDURE
\"PJN5CTF\", \"BEART\", \"[id number]\"     Type & Screen (If Applicable):  No results found for: \"LABABO\", \"LABRH\"    Drug/Infectious Status (If Applicable):  No results found for: \"HIV\", \"HEPCAB\"    COVID-19 Screening (If Applicable): No results found for: \"COVID19\"        Anesthesia Evaluation  Patient summary reviewed and Nursing notes reviewed no history of anesthetic complications:   Airway: Mallampati: II  TM distance: >3 FB   Neck ROM: full  Mouth opening: > = 3 FB   Dental:    (+) caps      Pulmonary:Negative Pulmonary ROS and normal exam  breath sounds clear to auscultation                             Cardiovascular:    (+) hypertension:, CAD:, hyperlipidemia        Rhythm: regular  Rate: normal                    Neuro/Psych:   Negative Neuro/Psych ROS              GI/Hepatic/Renal:   (+) GERD:, renal disease: CRI,          ROS comment: Diverticulosis. Endo/Other: Negative Endo/Other ROS                    Abdominal: normal exam            Vascular: negative vascular ROS. Other Findings:           Anesthesia Plan      general     ASA 2       Induction: intravenous. BIS  MIPS: Postoperative opioids intended and Prophylactic antiemetics administered. Anesthetic plan and risks discussed with patient. Plan discussed with CRNA.                     Ja Pendleton MD   9/25/2023

## 2023-09-26 ENCOUNTER — ANESTHESIA (OUTPATIENT)
Facility: HOSPITAL | Age: 78
DRG: 330 | End: 2023-09-26
Payer: MEDICARE

## 2023-09-26 ENCOUNTER — HOSPITAL ENCOUNTER (INPATIENT)
Facility: HOSPITAL | Age: 78
LOS: 2 days | Discharge: HOME | DRG: 330 | End: 2023-09-28
Attending: SURGERY | Admitting: SURGERY
Payer: MEDICARE

## 2023-09-26 DIAGNOSIS — K56.699 DIVERTICULAR STRICTURE (HCC): Primary | ICD-10-CM

## 2023-09-26 PROCEDURE — 6360000002 HC RX W HCPCS: Performed by: REGISTERED NURSE

## 2023-09-26 PROCEDURE — 6360000002 HC RX W HCPCS: Performed by: SURGERY

## 2023-09-26 PROCEDURE — 7100000001 HC PACU RECOVERY - ADDTL 15 MIN: Performed by: SURGERY

## 2023-09-26 PROCEDURE — 0T9B80Z DRAINAGE OF BLADDER WITH DRAINAGE DEVICE, VIA NATURAL OR ARTIFICIAL OPENING ENDOSCOPIC: ICD-10-PCS | Performed by: UROLOGY

## 2023-09-26 PROCEDURE — 6360000002 HC RX W HCPCS: Performed by: ANESTHESIOLOGY

## 2023-09-26 PROCEDURE — 6370000000 HC RX 637 (ALT 250 FOR IP): Performed by: SURGERY

## 2023-09-26 PROCEDURE — 3600000009 HC SURGERY ROBOT BASE: Performed by: SURGERY

## 2023-09-26 PROCEDURE — 2580000003 HC RX 258: Performed by: SURGERY

## 2023-09-26 PROCEDURE — 2500000003 HC RX 250 WO HCPCS: Performed by: REGISTERED NURSE

## 2023-09-26 PROCEDURE — 0DTN4ZZ RESECTION OF SIGMOID COLON, PERCUTANEOUS ENDOSCOPIC APPROACH: ICD-10-PCS | Performed by: SURGERY

## 2023-09-26 PROCEDURE — 3700000000 HC ANESTHESIA ATTENDED CARE: Performed by: SURGERY

## 2023-09-26 PROCEDURE — 1100000000 HC RM PRIVATE

## 2023-09-26 PROCEDURE — S2900 ROBOTIC SURGICAL SYSTEM: HCPCS | Performed by: SURGERY

## 2023-09-26 PROCEDURE — 2580000003 HC RX 258: Performed by: REGISTERED NURSE

## 2023-09-26 PROCEDURE — 2500000003 HC RX 250 WO HCPCS: Performed by: SURGERY

## 2023-09-26 PROCEDURE — 6360000002 HC RX W HCPCS: Performed by: NURSE PRACTITIONER

## 2023-09-26 PROCEDURE — C1758 CATHETER, URETERAL: HCPCS | Performed by: SURGERY

## 2023-09-26 PROCEDURE — 2580000003 HC RX 258: Performed by: ANESTHESIOLOGY

## 2023-09-26 PROCEDURE — 2720000010 HC SURG SUPPLY STERILE: Performed by: SURGERY

## 2023-09-26 PROCEDURE — 6370000000 HC RX 637 (ALT 250 FOR IP): Performed by: ANESTHESIOLOGY

## 2023-09-26 PROCEDURE — 7100000000 HC PACU RECOVERY - FIRST 15 MIN: Performed by: SURGERY

## 2023-09-26 PROCEDURE — 0TJB8ZZ INSPECTION OF BLADDER, VIA NATURAL OR ARTIFICIAL OPENING ENDOSCOPIC: ICD-10-PCS | Performed by: UROLOGY

## 2023-09-26 PROCEDURE — 88305 TISSUE EXAM BY PATHOLOGIST: CPT

## 2023-09-26 PROCEDURE — 6370000000 HC RX 637 (ALT 250 FOR IP): Performed by: NURSE PRACTITIONER

## 2023-09-26 PROCEDURE — 2500000003 HC RX 250 WO HCPCS: Performed by: ANESTHESIOLOGY

## 2023-09-26 PROCEDURE — 88307 TISSUE EXAM BY PATHOLOGIST: CPT

## 2023-09-26 PROCEDURE — 2709999900 HC NON-CHARGEABLE SUPPLY: Performed by: SURGERY

## 2023-09-26 PROCEDURE — 3700000001 HC ADD 15 MINUTES (ANESTHESIA): Performed by: SURGERY

## 2023-09-26 PROCEDURE — 8E0W4CZ ROBOTIC ASSISTED PROCEDURE OF TRUNK REGION, PERCUTANEOUS ENDOSCOPIC APPROACH: ICD-10-PCS | Performed by: SURGERY

## 2023-09-26 PROCEDURE — 3600000019 HC SURGERY ROBOT ADDTL 15MIN: Performed by: SURGERY

## 2023-09-26 RX ORDER — FENTANYL CITRATE 50 UG/ML
25 INJECTION, SOLUTION INTRAMUSCULAR; INTRAVENOUS
Status: DISCONTINUED | OUTPATIENT
Start: 2023-09-26 | End: 2023-09-26 | Stop reason: HOSPADM

## 2023-09-26 RX ORDER — ACETAMINOPHEN 325 MG/1
650 TABLET ORAL ONCE
Status: DISCONTINUED | OUTPATIENT
Start: 2023-09-26 | End: 2023-09-26 | Stop reason: HOSPADM

## 2023-09-26 RX ORDER — LIDOCAINE HYDROCHLORIDE 20 MG/ML
INJECTION, SOLUTION EPIDURAL; INFILTRATION; INTRACAUDAL; PERINEURAL PRN
Status: DISCONTINUED | OUTPATIENT
Start: 2023-09-26 | End: 2023-09-26 | Stop reason: SDUPTHER

## 2023-09-26 RX ORDER — SODIUM CHLORIDE, SODIUM LACTATE, POTASSIUM CHLORIDE, CALCIUM CHLORIDE 600; 310; 30; 20 MG/100ML; MG/100ML; MG/100ML; MG/100ML
INJECTION, SOLUTION INTRAVENOUS CONTINUOUS PRN
Status: DISCONTINUED | OUTPATIENT
Start: 2023-09-26 | End: 2023-09-26 | Stop reason: SDUPTHER

## 2023-09-26 RX ORDER — SODIUM CHLORIDE 0.9 % (FLUSH) 0.9 %
5-40 SYRINGE (ML) INJECTION EVERY 12 HOURS SCHEDULED
Status: DISCONTINUED | OUTPATIENT
Start: 2023-09-26 | End: 2023-09-26 | Stop reason: HOSPADM

## 2023-09-26 RX ORDER — CELECOXIB 200 MG/1
200 CAPSULE ORAL ONCE
Status: COMPLETED | OUTPATIENT
Start: 2023-09-26 | End: 2023-09-26

## 2023-09-26 RX ORDER — ONDANSETRON 2 MG/ML
INJECTION INTRAMUSCULAR; INTRAVENOUS
Status: DISCONTINUED
Start: 2023-09-26 | End: 2023-09-26 | Stop reason: WASHOUT

## 2023-09-26 RX ORDER — ROCURONIUM BROMIDE 10 MG/ML
INJECTION, SOLUTION INTRAVENOUS PRN
Status: DISCONTINUED | OUTPATIENT
Start: 2023-09-26 | End: 2023-09-26 | Stop reason: SDUPTHER

## 2023-09-26 RX ORDER — KETOROLAC TROMETHAMINE 30 MG/ML
15 INJECTION, SOLUTION INTRAMUSCULAR; INTRAVENOUS EVERY 6 HOURS PRN
Status: DISCONTINUED | OUTPATIENT
Start: 2023-09-26 | End: 2023-09-28 | Stop reason: HOSPADM

## 2023-09-26 RX ORDER — PROCHLORPERAZINE EDISYLATE 5 MG/ML
5 INJECTION INTRAMUSCULAR; INTRAVENOUS
Status: DISCONTINUED | OUTPATIENT
Start: 2023-09-26 | End: 2023-09-26 | Stop reason: HOSPADM

## 2023-09-26 RX ORDER — SODIUM CHLORIDE, SODIUM LACTATE, POTASSIUM CHLORIDE, CALCIUM CHLORIDE 600; 310; 30; 20 MG/100ML; MG/100ML; MG/100ML; MG/100ML
INJECTION, SOLUTION INTRAVENOUS CONTINUOUS
Status: DISCONTINUED | OUTPATIENT
Start: 2023-09-26 | End: 2023-09-28 | Stop reason: HOSPADM

## 2023-09-26 RX ORDER — ENOXAPARIN SODIUM 100 MG/ML
40 INJECTION SUBCUTANEOUS DAILY
Status: DISCONTINUED | OUTPATIENT
Start: 2023-09-27 | End: 2023-09-28 | Stop reason: HOSPADM

## 2023-09-26 RX ORDER — TRIAMTERENE AND HYDROCHLOROTHIAZIDE 37.5; 25 MG/1; MG/1
1 TABLET ORAL DAILY
Status: DISCONTINUED | OUTPATIENT
Start: 2023-09-27 | End: 2023-09-28 | Stop reason: HOSPADM

## 2023-09-26 RX ORDER — SODIUM CHLORIDE 9 MG/ML
INJECTION, SOLUTION INTRAVENOUS PRN
Status: DISCONTINUED | OUTPATIENT
Start: 2023-09-26 | End: 2023-09-26 | Stop reason: HOSPADM

## 2023-09-26 RX ORDER — FENTANYL CITRATE 50 UG/ML
INJECTION, SOLUTION INTRAMUSCULAR; INTRAVENOUS PRN
Status: DISCONTINUED | OUTPATIENT
Start: 2023-09-26 | End: 2023-09-26 | Stop reason: SDUPTHER

## 2023-09-26 RX ORDER — ACETAMINOPHEN 325 MG/1
650 TABLET ORAL EVERY 6 HOURS
Status: DISCONTINUED | OUTPATIENT
Start: 2023-09-26 | End: 2023-09-28 | Stop reason: HOSPADM

## 2023-09-26 RX ORDER — INDOCYANINE GREEN AND WATER 25 MG
5 KIT INJECTION ONCE
Status: COMPLETED | OUTPATIENT
Start: 2023-09-26 | End: 2023-09-26

## 2023-09-26 RX ORDER — FELODIPINE 5 MG/1
5 TABLET, EXTENDED RELEASE ORAL DAILY
Status: DISCONTINUED | OUTPATIENT
Start: 2023-09-26 | End: 2023-09-26

## 2023-09-26 RX ORDER — PROPOFOL 10 MG/ML
INJECTION, EMULSION INTRAVENOUS PRN
Status: DISCONTINUED | OUTPATIENT
Start: 2023-09-26 | End: 2023-09-26 | Stop reason: SDUPTHER

## 2023-09-26 RX ORDER — PROCHLORPERAZINE EDISYLATE 5 MG/ML
10 INJECTION INTRAMUSCULAR; INTRAVENOUS EVERY 6 HOURS PRN
Status: DISCONTINUED | OUTPATIENT
Start: 2023-09-26 | End: 2023-09-28 | Stop reason: HOSPADM

## 2023-09-26 RX ORDER — DIPHENHYDRAMINE HYDROCHLORIDE 50 MG/ML
12.5 INJECTION INTRAMUSCULAR; INTRAVENOUS
Status: DISCONTINUED | OUTPATIENT
Start: 2023-09-26 | End: 2023-09-26 | Stop reason: HOSPADM

## 2023-09-26 RX ORDER — SODIUM CHLORIDE 0.9 % (FLUSH) 0.9 %
5-40 SYRINGE (ML) INJECTION PRN
Status: DISCONTINUED | OUTPATIENT
Start: 2023-09-26 | End: 2023-09-28 | Stop reason: HOSPADM

## 2023-09-26 RX ORDER — SODIUM CHLORIDE 0.9 % (FLUSH) 0.9 %
5-40 SYRINGE (ML) INJECTION EVERY 12 HOURS SCHEDULED
Status: DISCONTINUED | OUTPATIENT
Start: 2023-09-26 | End: 2023-09-28 | Stop reason: HOSPADM

## 2023-09-26 RX ORDER — SODIUM CHLORIDE, SODIUM LACTATE, POTASSIUM CHLORIDE, CALCIUM CHLORIDE 600; 310; 30; 20 MG/100ML; MG/100ML; MG/100ML; MG/100ML
INJECTION, SOLUTION INTRAVENOUS CONTINUOUS
Status: DISCONTINUED | OUTPATIENT
Start: 2023-09-26 | End: 2023-09-26 | Stop reason: HOSPADM

## 2023-09-26 RX ORDER — SCOLOPAMINE TRANSDERMAL SYSTEM 1 MG/1
1 PATCH, EXTENDED RELEASE TRANSDERMAL
Status: DISCONTINUED | OUTPATIENT
Start: 2023-09-26 | End: 2023-09-28 | Stop reason: HOSPADM

## 2023-09-26 RX ORDER — ACETAMINOPHEN 500 MG
1000 TABLET ORAL ONCE
Status: COMPLETED | OUTPATIENT
Start: 2023-09-26 | End: 2023-09-26

## 2023-09-26 RX ORDER — MIDAZOLAM HYDROCHLORIDE 1 MG/ML
2 INJECTION, SOLUTION INTRAMUSCULAR; INTRAVENOUS
Status: DISCONTINUED | OUTPATIENT
Start: 2023-09-26 | End: 2023-09-26 | Stop reason: HOSPADM

## 2023-09-26 RX ORDER — LIDOCAINE HYDROCHLORIDE ANHYDROUS AND DEXTROSE MONOHYDRATE 5; 400 G/100ML; MG/100ML
INJECTION, SOLUTION INTRAVENOUS CONTINUOUS PRN
Status: DISCONTINUED | OUTPATIENT
Start: 2023-09-26 | End: 2023-09-26 | Stop reason: SDUPTHER

## 2023-09-26 RX ORDER — OXYCODONE HYDROCHLORIDE 5 MG/1
5 TABLET ORAL
Status: COMPLETED | OUTPATIENT
Start: 2023-09-26 | End: 2023-09-26

## 2023-09-26 RX ORDER — INDOCYANINE GREEN AND WATER 25 MG
KIT INJECTION PRN
Status: DISCONTINUED | OUTPATIENT
Start: 2023-09-26 | End: 2023-09-26 | Stop reason: SDUPTHER

## 2023-09-26 RX ORDER — FENTANYL CITRATE 50 UG/ML
50 INJECTION, SOLUTION INTRAMUSCULAR; INTRAVENOUS
Status: DISCONTINUED | OUTPATIENT
Start: 2023-09-26 | End: 2023-09-26 | Stop reason: HOSPADM

## 2023-09-26 RX ORDER — SODIUM CHLORIDE 0.9 % (FLUSH) 0.9 %
5-40 SYRINGE (ML) INJECTION PRN
Status: DISCONTINUED | OUTPATIENT
Start: 2023-09-26 | End: 2023-09-26 | Stop reason: HOSPADM

## 2023-09-26 RX ORDER — MAGNESIUM SULFATE HEPTAHYDRATE 40 MG/ML
INJECTION, SOLUTION INTRAVENOUS PRN
Status: DISCONTINUED | OUTPATIENT
Start: 2023-09-26 | End: 2023-09-26 | Stop reason: SDUPTHER

## 2023-09-26 RX ORDER — SODIUM CHLORIDE, SODIUM LACTATE, POTASSIUM CHLORIDE, CALCIUM CHLORIDE 600; 310; 30; 20 MG/100ML; MG/100ML; MG/100ML; MG/100ML
INJECTION, SOLUTION INTRAVENOUS ONCE
Status: DISCONTINUED | OUTPATIENT
Start: 2023-09-26 | End: 2023-09-26 | Stop reason: HOSPADM

## 2023-09-26 RX ORDER — BUPIVACAINE HYDROCHLORIDE 5 MG/ML
INJECTION, SOLUTION EPIDURAL; INTRACAUDAL PRN
Status: DISCONTINUED | OUTPATIENT
Start: 2023-09-26 | End: 2023-09-26 | Stop reason: ALTCHOICE

## 2023-09-26 RX ORDER — SODIUM CHLORIDE 9 MG/ML
INJECTION, SOLUTION INTRAVENOUS PRN
Status: DISCONTINUED | OUTPATIENT
Start: 2023-09-26 | End: 2023-09-28 | Stop reason: HOSPADM

## 2023-09-26 RX ORDER — AMLODIPINE BESYLATE 5 MG/1
5 TABLET ORAL DAILY
Status: DISCONTINUED | OUTPATIENT
Start: 2023-09-27 | End: 2023-09-28 | Stop reason: HOSPADM

## 2023-09-26 RX ORDER — MIDAZOLAM HYDROCHLORIDE 1 MG/ML
INJECTION INTRAMUSCULAR; INTRAVENOUS PRN
Status: DISCONTINUED | OUTPATIENT
Start: 2023-09-26 | End: 2023-09-26 | Stop reason: SDUPTHER

## 2023-09-26 RX ORDER — OXYCODONE HYDROCHLORIDE 5 MG/1
5 TABLET ORAL EVERY 4 HOURS PRN
Status: DISCONTINUED | OUTPATIENT
Start: 2023-09-26 | End: 2023-09-28 | Stop reason: HOSPADM

## 2023-09-26 RX ORDER — ASPIRIN 81 MG/1
81 TABLET ORAL EVERY OTHER DAY
Status: DISCONTINUED | OUTPATIENT
Start: 2023-09-27 | End: 2023-09-28 | Stop reason: HOSPADM

## 2023-09-26 RX ORDER — LIDOCAINE HYDROCHLORIDE 10 MG/ML
1 INJECTION, SOLUTION EPIDURAL; INFILTRATION; INTRACAUDAL; PERINEURAL
Status: DISCONTINUED | OUTPATIENT
Start: 2023-09-26 | End: 2023-09-26 | Stop reason: HOSPADM

## 2023-09-26 RX ORDER — DEXAMETHASONE SODIUM PHOSPHATE 4 MG/ML
INJECTION, SOLUTION INTRA-ARTICULAR; INTRALESIONAL; INTRAMUSCULAR; INTRAVENOUS; SOFT TISSUE PRN
Status: DISCONTINUED | OUTPATIENT
Start: 2023-09-26 | End: 2023-09-26 | Stop reason: SDUPTHER

## 2023-09-26 RX ORDER — HYDROMORPHONE HYDROCHLORIDE 1 MG/ML
0.5 INJECTION, SOLUTION INTRAMUSCULAR; INTRAVENOUS; SUBCUTANEOUS
Status: DISCONTINUED | OUTPATIENT
Start: 2023-09-26 | End: 2023-09-28 | Stop reason: HOSPADM

## 2023-09-26 RX ORDER — LABETALOL HYDROCHLORIDE 5 MG/ML
10 INJECTION, SOLUTION INTRAVENOUS
Status: DISCONTINUED | OUTPATIENT
Start: 2023-09-26 | End: 2023-09-26 | Stop reason: HOSPADM

## 2023-09-26 RX ORDER — ONDANSETRON 4 MG/1
4 TABLET, ORALLY DISINTEGRATING ORAL EVERY 8 HOURS PRN
Status: DISCONTINUED | OUTPATIENT
Start: 2023-09-26 | End: 2023-09-28 | Stop reason: HOSPADM

## 2023-09-26 RX ORDER — ONDANSETRON 2 MG/ML
4 INJECTION INTRAMUSCULAR; INTRAVENOUS EVERY 6 HOURS PRN
Status: DISCONTINUED | OUTPATIENT
Start: 2023-09-26 | End: 2023-09-28 | Stop reason: HOSPADM

## 2023-09-26 RX ORDER — ONDANSETRON 2 MG/ML
4 INJECTION INTRAMUSCULAR; INTRAVENOUS
Status: DISCONTINUED | OUTPATIENT
Start: 2023-09-26 | End: 2023-09-26 | Stop reason: HOSPADM

## 2023-09-26 RX ORDER — SODIUM CHLORIDE 9 MG/ML
INJECTION, SOLUTION INTRAVENOUS CONTINUOUS
Status: DISCONTINUED | OUTPATIENT
Start: 2023-09-26 | End: 2023-09-26 | Stop reason: HOSPADM

## 2023-09-26 RX ORDER — DORZOLAMIDE HYDROCHLORIDE AND TIMOLOL MALEATE 20; 5 MG/ML; MG/ML
1 SOLUTION/ DROPS OPHTHALMIC 2 TIMES DAILY
Status: DISCONTINUED | OUTPATIENT
Start: 2023-09-26 | End: 2023-09-28 | Stop reason: HOSPADM

## 2023-09-26 RX ORDER — HYDROMORPHONE HYDROCHLORIDE 1 MG/ML
0.5 INJECTION, SOLUTION INTRAMUSCULAR; INTRAVENOUS; SUBCUTANEOUS EVERY 5 MIN PRN
Status: COMPLETED | OUTPATIENT
Start: 2023-09-26 | End: 2023-09-26

## 2023-09-26 RX ORDER — MAGNESIUM SULFATE 1 G/100ML
1000 INJECTION INTRAVENOUS PRN
Status: DISCONTINUED | OUTPATIENT
Start: 2023-09-26 | End: 2023-09-28 | Stop reason: HOSPADM

## 2023-09-26 RX ORDER — POTASSIUM CHLORIDE 20 MEQ/1
20 TABLET, EXTENDED RELEASE ORAL DAILY
Status: DISCONTINUED | OUTPATIENT
Start: 2023-09-27 | End: 2023-09-28 | Stop reason: HOSPADM

## 2023-09-26 RX ORDER — ONDANSETRON 2 MG/ML
INJECTION INTRAMUSCULAR; INTRAVENOUS PRN
Status: DISCONTINUED | OUTPATIENT
Start: 2023-09-26 | End: 2023-09-26 | Stop reason: SDUPTHER

## 2023-09-26 RX ORDER — MIDAZOLAM HYDROCHLORIDE 2 MG/2ML
2 INJECTION, SOLUTION INTRAMUSCULAR; INTRAVENOUS
Status: DISCONTINUED | OUTPATIENT
Start: 2023-09-26 | End: 2023-09-26 | Stop reason: HOSPADM

## 2023-09-26 RX ORDER — SODIUM CHLORIDE, SODIUM LACTATE, POTASSIUM CHLORIDE, CALCIUM CHLORIDE 600; 310; 30; 20 MG/100ML; MG/100ML; MG/100ML; MG/100ML
INJECTION, SOLUTION INTRAVENOUS ONCE
Status: COMPLETED | OUTPATIENT
Start: 2023-09-26 | End: 2023-09-26

## 2023-09-26 RX ORDER — KETAMINE HCL IN NACL, ISO-OSM 100MG/10ML
SYRINGE (ML) INJECTION PRN
Status: DISCONTINUED | OUTPATIENT
Start: 2023-09-26 | End: 2023-09-26 | Stop reason: SDUPTHER

## 2023-09-26 RX ORDER — HYDROMORPHONE HYDROCHLORIDE 1 MG/ML
0.25 INJECTION, SOLUTION INTRAMUSCULAR; INTRAVENOUS; SUBCUTANEOUS
Status: DISCONTINUED | OUTPATIENT
Start: 2023-09-26 | End: 2023-09-28 | Stop reason: HOSPADM

## 2023-09-26 RX ORDER — NEOSTIGMINE METHYLSULFATE 1 MG/ML
INJECTION, SOLUTION INTRAVENOUS PRN
Status: DISCONTINUED | OUTPATIENT
Start: 2023-09-26 | End: 2023-09-26 | Stop reason: SDUPTHER

## 2023-09-26 RX ORDER — FENTANYL CITRATE 50 UG/ML
25 INJECTION, SOLUTION INTRAMUSCULAR; INTRAVENOUS EVERY 5 MIN PRN
Status: COMPLETED | OUTPATIENT
Start: 2023-09-26 | End: 2023-09-26

## 2023-09-26 RX ORDER — POTASSIUM CHLORIDE 7.45 MG/ML
10 INJECTION INTRAVENOUS PRN
Status: DISCONTINUED | OUTPATIENT
Start: 2023-09-26 | End: 2023-09-28 | Stop reason: HOSPADM

## 2023-09-26 RX ORDER — GLYCOPYRROLATE 0.2 MG/ML
INJECTION INTRAMUSCULAR; INTRAVENOUS PRN
Status: DISCONTINUED | OUTPATIENT
Start: 2023-09-26 | End: 2023-09-26 | Stop reason: SDUPTHER

## 2023-09-26 RX ORDER — ATORVASTATIN CALCIUM 20 MG/1
20 TABLET, FILM COATED ORAL DAILY
Status: DISCONTINUED | OUTPATIENT
Start: 2023-09-27 | End: 2023-09-28 | Stop reason: HOSPADM

## 2023-09-26 RX ORDER — OXYCODONE HYDROCHLORIDE 5 MG/1
10 TABLET ORAL EVERY 4 HOURS PRN
Status: DISCONTINUED | OUTPATIENT
Start: 2023-09-26 | End: 2023-09-28 | Stop reason: HOSPADM

## 2023-09-26 RX ADMIN — MAGNESIUM SULFATE IN WATER 2000 MG: 40 INJECTION, SOLUTION INTRAVENOUS at 08:16

## 2023-09-26 RX ADMIN — ACETAMINOPHEN 1000 MG: 500 TABLET ORAL at 06:28

## 2023-09-26 RX ADMIN — HYDROMORPHONE HYDROCHLORIDE 0.5 MG: 1 INJECTION, SOLUTION INTRAMUSCULAR; INTRAVENOUS; SUBCUTANEOUS at 12:10

## 2023-09-26 RX ADMIN — Medication 10 MG: at 08:54

## 2023-09-26 RX ADMIN — ROCURONIUM BROMIDE 40 MG: 10 INJECTION INTRAVENOUS at 07:49

## 2023-09-26 RX ADMIN — FENTANYL CITRATE 50 MCG: 50 INJECTION, SOLUTION INTRAMUSCULAR; INTRAVENOUS at 07:48

## 2023-09-26 RX ADMIN — INDOCYANINE GREEN 5 MG: KIT INTRAVENOUS at 09:09

## 2023-09-26 RX ADMIN — FENTANYL CITRATE 25 MCG: 50 INJECTION, SOLUTION INTRAMUSCULAR; INTRAVENOUS at 12:00

## 2023-09-26 RX ADMIN — GLYCOPYRROLATE 0.6 MG: 0.2 INJECTION INTRAMUSCULAR; INTRAVENOUS at 10:19

## 2023-09-26 RX ADMIN — OXYCODONE HYDROCHLORIDE 5 MG: 5 TABLET ORAL at 12:00

## 2023-09-26 RX ADMIN — HYDROMORPHONE HYDROCHLORIDE 0.5 MG: 1 INJECTION, SOLUTION INTRAMUSCULAR; INTRAVENOUS; SUBCUTANEOUS at 12:25

## 2023-09-26 RX ADMIN — MIDAZOLAM HYDROCHLORIDE 1 MG: 1 INJECTION, SOLUTION INTRAMUSCULAR; INTRAVENOUS at 07:35

## 2023-09-26 RX ADMIN — FENTANYL CITRATE 25 MCG: 50 INJECTION, SOLUTION INTRAMUSCULAR; INTRAVENOUS at 10:06

## 2023-09-26 RX ADMIN — SODIUM CHLORIDE, POTASSIUM CHLORIDE, SODIUM LACTATE AND CALCIUM CHLORIDE: 600; 310; 30; 20 INJECTION, SOLUTION INTRAVENOUS at 07:44

## 2023-09-26 RX ADMIN — CEFOXITIN 2000 MG: 2 INJECTION, POWDER, FOR SOLUTION INTRAVENOUS at 07:53

## 2023-09-26 RX ADMIN — SODIUM CHLORIDE, POTASSIUM CHLORIDE, SODIUM LACTATE AND CALCIUM CHLORIDE: 600; 310; 30; 20 INJECTION, SOLUTION INTRAVENOUS at 14:24

## 2023-09-26 RX ADMIN — INDOCYANINE GREEN 5 MG: KIT INTRAVENOUS at 09:33

## 2023-09-26 RX ADMIN — FENTANYL CITRATE 25 MCG: 50 INJECTION, SOLUTION INTRAMUSCULAR; INTRAVENOUS at 12:05

## 2023-09-26 RX ADMIN — PROPOFOL 140 MG: 10 INJECTION, EMULSION INTRAVENOUS at 07:48

## 2023-09-26 RX ADMIN — Medication 4.5 MG: at 10:19

## 2023-09-26 RX ADMIN — ONDANSETRON 4 MG: 2 INJECTION INTRAMUSCULAR; INTRAVENOUS at 10:11

## 2023-09-26 RX ADMIN — CELECOXIB 200 MG: 200 CAPSULE ORAL at 06:29

## 2023-09-26 RX ADMIN — LIDOCAINE HYDROCHLORIDE 2 MG/KG/HR: 4 INJECTION, SOLUTION INTRAVENOUS at 07:59

## 2023-09-26 RX ADMIN — SODIUM CHLORIDE, POTASSIUM CHLORIDE, SODIUM LACTATE AND CALCIUM CHLORIDE: 600; 310; 30; 20 INJECTION, SOLUTION INTRAVENOUS at 06:52

## 2023-09-26 RX ADMIN — DEXAMETHASONE SODIUM PHOSPHATE 4 MG: 4 INJECTION INTRA-ARTICULAR; INTRALESIONAL; INTRAMUSCULAR; INTRAVENOUS; SOFT TISSUE at 08:06

## 2023-09-26 RX ADMIN — Medication 25 MG: at 07:59

## 2023-09-26 RX ADMIN — LIDOCAINE HYDROCHLORIDE 60 MG: 20 INJECTION, SOLUTION EPIDURAL; INFILTRATION; INTRACAUDAL; PERINEURAL at 07:48

## 2023-09-26 RX ADMIN — PROCHLORPERAZINE EDISYLATE 10 MG: 5 INJECTION INTRAMUSCULAR; INTRAVENOUS at 14:10

## 2023-09-26 RX ADMIN — ACETAMINOPHEN 650 MG: 325 TABLET ORAL at 19:45

## 2023-09-26 RX ADMIN — DORZOLAMIDE HYDROCHLORIDE AND TIMOLOL MALEATE 1 DROP: 20; 5 SOLUTION/ DROPS OPHTHALMIC at 19:45

## 2023-09-26 RX ADMIN — ROCURONIUM BROMIDE 10 MG: 10 INJECTION INTRAVENOUS at 09:33

## 2023-09-26 RX ADMIN — SODIUM CHLORIDE, PRESERVATIVE FREE 10 ML: 5 INJECTION INTRAVENOUS at 19:46

## 2023-09-26 RX ADMIN — CEFOXITIN 2000 MG: 2 INJECTION, POWDER, FOR SOLUTION INTRAVENOUS at 09:48

## 2023-09-26 RX ADMIN — SODIUM CHLORIDE, POTASSIUM CHLORIDE, SODIUM LACTATE AND CALCIUM CHLORIDE: 600; 310; 30; 20 INJECTION, SOLUTION INTRAVENOUS at 07:52

## 2023-09-26 ASSESSMENT — PAIN DESCRIPTION - ONSET
ONSET: AWAKENED FROM SLEEP
ONSET: AWAKENED FROM SLEEP

## 2023-09-26 ASSESSMENT — PAIN DESCRIPTION - FREQUENCY
FREQUENCY: INTERMITTENT
FREQUENCY: INTERMITTENT

## 2023-09-26 ASSESSMENT — PAIN DESCRIPTION - ORIENTATION
ORIENTATION: ANTERIOR
ORIENTATION: MID
ORIENTATION: ANTERIOR
ORIENTATION: MID

## 2023-09-26 ASSESSMENT — PAIN SCALES - GENERAL
PAINLEVEL_OUTOF10: 0
PAINLEVEL_OUTOF10: 0
PAINLEVEL_OUTOF10: 7
PAINLEVEL_OUTOF10: 7
PAINLEVEL_OUTOF10: 5
PAINLEVEL_OUTOF10: 6
PAINLEVEL_OUTOF10: 4
PAINLEVEL_OUTOF10: 8

## 2023-09-26 ASSESSMENT — PAIN DESCRIPTION - LOCATION
LOCATION: ABDOMEN

## 2023-09-26 ASSESSMENT — PAIN DESCRIPTION - DIRECTION
RADIATING_TOWARDS: MID ABDOMEN
RADIATING_TOWARDS: MID ABDOMEN

## 2023-09-26 ASSESSMENT — PAIN DESCRIPTION - DESCRIPTORS
DESCRIPTORS: DULL;ACHING
DESCRIPTORS: ACHING

## 2023-09-26 ASSESSMENT — PAIN DESCRIPTION - PAIN TYPE
TYPE: SURGICAL PAIN
TYPE: SURGICAL PAIN

## 2023-09-26 ASSESSMENT — PAIN - FUNCTIONAL ASSESSMENT: PAIN_FUNCTIONAL_ASSESSMENT: 0-10

## 2023-09-26 NOTE — OP NOTE
Operative Report    Patient: Missy Fernandes MRN: [id number]  SSN: [id number]    YOB: 1945  Age: 65 y.o. Sex: female       Date of Surgery: 9/26/23    Preoperative Diagnosis: Diverticulosis [K57.90]  Stricture of colon ([address]) [K56.699]     Postoperative Diagnosis: Diverticular stricture    Surgeon(s) and Role:  Panel 1:     * Carissa Reyes MD - Primary  Panel 2:     * Tucker Nam MD - Primary    Anesthesia: General     Procedure: Robotic assisted sigmoid colectomy    Procedure in Detail:   The patient was taken to the operating suite and placed in the supine position. General endotracheal anesthesia was induced. The patient was placed in the lithotomy position. A cystoscopy and bilateral ureteral catheters were placed. For more details of this procedure, please refer to the urology operative note. The patient was then placed in the low lithotomy position with the arms tucked. The abdomen was prepped and draped in the usual sterile fashion. A timeout was performed as per hospital protocol. An 8mm incision was made in the left upper quadrant and a Veress needle was inserted into the abdominal cavity. The abdomen was insufflated. An 8mm robotic trocar was placed into the abdominal cavity under direct visualization using a 5mm laparoscope. A second 8mm robotic trocar was placed in the right mid abdomen and a 12mm robotic trocar was placed in the right lower quadrant. A 5mm assistant trocar was placed in the right upper quadrant. It was through these 4 sites that the entirety of the procedure was completed. The patient was placed in the steep Trendelenburg position with left side up and the robot was docked. The sigmoid colon noted to be chronically inflamed. There was significant tortuosity with scar tissue and the distal sigmoid was stuck in the pelvis. After meticulous dissection, this was freed from the pelvic attachments.     The sigmoid colon was elevated identifying

## 2023-09-26 NOTE — PROGRESS NOTES
Nutrition Note    Chart reviewed; RD provided a 5-day supply of Ensure Surgery/Immunonutrition (10 bottles) to the bedside per ERAS protocol and discussed the importance of adequate nutrition/supplement compliance in effort to optimize wound healing and recovery from surgery. Pt agreeable and reports that she enjoyed the Ensure Surgery pre-op. RD anticipates good compliance once nausea resolves.        Electronically signed by Sammy Ferris RD, [address] [address] on 9/26/23 at 2:25 PM EDT    Contact: ext [phone]

## 2023-09-26 NOTE — PERIOP NOTE
06: 30= ambulated independently to Pre OP; A&Ox4, consents verified (4); changed into gown using CHG; mepilex dsg applied to sacrum; no erythema or skin breakdown noted; skin CDI. Warming blanket applied. 06:48= Dr. Lopez Hernandez in to discuss anesthesia.    07:19= Dr. Patti Yadav in to discuss surgery. 07:29= Dr. Christine Mcfadden in to discuss Urology part of surgery.

## 2023-09-26 NOTE — OP NOTE
[facility]  OPERATIVE REPORT    Name:  Camila Linn  MR#:  [id number]  :  1945  ACCOUNT #:  [de-identified]  DATE OF SERVICE:  2023    PREOPERATIVE DIAGNOSIS:  Diverticulosis. POSTOPERATIVE DIAGNOSIS:  Diverticulosis. PROCEDURE PERFORMED:  Cystoscopy, placement of bilateral ureteral catheters. SURGEON:  Danielle Zamudio MD    ASSISTANT:  NONE    ANESTHESIA:  GETA. COMPLICATIONS:  none. SPECIMENS REMOVED:  None. IMPLANTS:  Bilateral ureteral caths    ESTIMATED BLOOD LOSS:  None. IV FLUIDS:  Per Anesthesia. DISPOSITION:  Ureteral catheters can be removed at the end of the case. PROCEDURE:  After the patient was correctly identified and informed consent was obtained, she was brought to operative suite. Preoperative antibiotics has been chosen by the primary surgeon. Anesthesia was undertaken. She was positioned in lithotomy. The perineum and genitalia were prepped and draped in a routine sterile fashion. The cystoscope was inserted per urethra noting a central defect cystocele. Bladder surface was free of abnormalities with no obvious fistula. A 5-Indonesian Whistle-tip catheters were sequentially negotiated into the ureteral orifices and twisted up into the kidney until the resistance of the upper collecting system was felt. I offloaded from the catheters and inserted a 16 Jaquez. The ureteral catheters were attached to this to prevent dislodgment and attached to ICG syringes.       Danielle Zamudio MD      [id number]  D:  2023 8:48  T:  2023 11:39  JOB #:  [id number]

## 2023-09-26 NOTE — BRIEF OP NOTE
Brief Postoperative Note      Patient: Ted Sandra  YOB: 1945  MRN: [id number]    Date of Procedure: 9/26/2023    Pre-Op Diagnosis Codes:     * Diverticulosis [K57.90]     * Stricture of colon ([address]) [I57.439]    Post-Op Diagnosis: Same       Procedure(s):  ROBOTIC ASSISTED SIGMOID HEMICOLECTOMY WITH FLEXIBLE SIGMOIDOSCOPY,CYSTOSCOPY WITH INSERTION BILATERAL URETERALCATHETERS  (E R A S)  . Antoni Stein     Surgeon(s):  MD Rosa Jason MD    Assistant:  * No surgical staff found *    Anesthesia: General    Estimated Blood Loss (mL): less than 50     Complications: None    Specimens:   ID Type Source Tests Collected by Time Destination   A : distal ring Tissue Sigmoid Colon SURGICAL PATHOLOGY Luly Espinoza II, MD 9/26/2023 5647    B : proximal ring Tissue Sigmoid Colon SURGICAL PATHOLOGY Benjamin Sosa MD 9/26/2023 5436    C : sigmoid colon Tissue Sigmoid Colon SURGICAL PATHOLOGY Luly Espinoza II, MD 9/26/2023 0940        Implants:  * No implants in log *      Drains:   Urinary Catheter 09/26/23 2 Way (Active)       Findings: Tortuosity of the sigmoid colon  This procedure was not performed to treat colon cancer through resection      Electronically signed by Lyn Mederos MD on 9/26/2023 at 10:38 AM

## 2023-09-26 NOTE — ANESTHESIA POSTPROCEDURE EVALUATION
Department of Anesthesiology  Postprocedure Note    Patient: Angeles Morel  MRN: [id number]  YOB: 1945  Date of evaluation: 9/26/2023      Procedure Summary     Date: 09/26/23 Room / Location: [facility] M9 / [facility]    Anesthesia Start: 0735 Anesthesia Stop: 1054    Procedures:       ROBOTIC ASSISTED SIGMOID HEMICOLECTOMY WITH FLEXIBLE SIGMOIDOSCOPY,CYSTOSCOPY WITH INSERTION BILATERAL URETERALCATHETERS  (E R A S) (Abdomen)      .  (Bladder)      CYSTOSCOPY (Bladder) Diagnosis:       Diverticulosis      Stricture of colon ([address])      (Diverticulosis [K57.90])      (Stricture of colon ([address]) [K56.699])    Providers: Jennifer Lambert MD; Rosa Zhang MD Responsible Provider: Ja Pendleton MD    Anesthesia Type: General ASA Status: 2          Anesthesia Type: General    Ilir Phase I: Ilir Score: 9    Ilir Phase II:        Anesthesia Post Evaluation    Patient location during evaluation: PACU  Patient participation: complete - patient participated  Level of consciousness: awake and alert  Airway patency: patent  Nausea & Vomiting: no nausea  Complications: no  Cardiovascular status: hemodynamically stable  Respiratory status: acceptable  Hydration status: euvolemic Goals: 1. Nutrition Goal:  Aim for three consistent meal times a day. Remember order of how you want to eat- choose lean protein food first, followed by vegetables and fruit, then starch food last if still hungry. 2. Water Goal:  I will make sure drinking at least 64 oz of water a day or greater- great job with this! 3. Exercise Goal:  I will walk outside with  for ~ 30-40 minutes at least one-two times a week  Stay as active as you can - exercise is important!   Call office to schedule with Dr Enmanuel Stahl on [facility] 26th or April 9th in morning -[phone]

## 2023-09-27 LAB
ANION GAP SERPL CALC-SCNC: 4 MMOL/L (ref 5–15)
BASOPHILS # BLD: 0 K/UL (ref 0–0.1)
BASOPHILS NFR BLD: 0 % (ref 0–1)
BUN SERPL-MCNC: 17 MG/DL (ref 6–20)
BUN/CREAT SERPL: 14 (ref 12–20)
CALCIUM SERPL-MCNC: 8 MG/DL (ref 8.5–10.1)
CHLORIDE SERPL-SCNC: 108 MMOL/L (ref 97–108)
CO2 SERPL-SCNC: 28 MMOL/L (ref 21–32)
CREAT SERPL-MCNC: 1.25 MG/DL (ref 0.55–1.02)
DIFFERENTIAL METHOD BLD: ABNORMAL
EOSINOPHIL # BLD: 0 K/UL (ref 0–0.4)
EOSINOPHIL NFR BLD: 0 % (ref 0–7)
ERYTHROCYTE [DISTWIDTH] IN BLOOD BY AUTOMATED COUNT: 15.9 % (ref 11.5–14.5)
GLUCOSE SERPL-MCNC: 112 MG/DL (ref 65–100)
HCT VFR BLD AUTO: 31.2 % (ref 35–47)
HGB BLD-MCNC: 9.8 G/DL (ref 11.5–16)
IMM GRANULOCYTES # BLD AUTO: 0.1 K/UL (ref 0–0.04)
IMM GRANULOCYTES NFR BLD AUTO: 1 % (ref 0–0.5)
LYMPHOCYTES # BLD: 1.9 K/UL (ref 0.8–3.5)
LYMPHOCYTES NFR BLD: 17 % (ref 12–49)
MAGNESIUM SERPL-MCNC: 2.5 MG/DL (ref 1.6–2.4)
MCH RBC QN AUTO: 22.9 PG (ref 26–34)
MCHC RBC AUTO-ENTMCNC: 31.4 G/DL (ref 30–36.5)
MCV RBC AUTO: 72.9 FL (ref 80–99)
MONOCYTES # BLD: 1 K/UL (ref 0–1)
MONOCYTES NFR BLD: 10 % (ref 5–13)
NEUTS SEG # BLD: 7.7 K/UL (ref 1.8–8)
NEUTS SEG NFR BLD: 72 % (ref 32–75)
NRBC # BLD: 0 K/UL (ref 0–0.01)
NRBC BLD-RTO: 0 PER 100 WBC
PLATELET # BLD AUTO: 228 K/UL (ref 150–400)
PMV BLD AUTO: 10.8 FL (ref 8.9–12.9)
POTASSIUM SERPL-SCNC: 3.2 MMOL/L (ref 3.5–5.1)
RBC # BLD AUTO: 4.28 M/UL (ref 3.8–5.2)
SODIUM SERPL-SCNC: 140 MMOL/L (ref 136–145)
WBC # BLD AUTO: 10.7 K/UL (ref 3.6–11)

## 2023-09-27 PROCEDURE — 85025 COMPLETE CBC W/AUTO DIFF WBC: CPT

## 2023-09-27 PROCEDURE — 36415 COLL VENOUS BLD VENIPUNCTURE: CPT

## 2023-09-27 PROCEDURE — 6370000000 HC RX 637 (ALT 250 FOR IP): Performed by: SURGERY

## 2023-09-27 PROCEDURE — 6360000002 HC RX W HCPCS: Performed by: SURGERY

## 2023-09-27 PROCEDURE — 80048 BASIC METABOLIC PNL TOTAL CA: CPT

## 2023-09-27 PROCEDURE — 83735 ASSAY OF MAGNESIUM: CPT

## 2023-09-27 PROCEDURE — 2580000003 HC RX 258: Performed by: SURGERY

## 2023-09-27 PROCEDURE — 99024 POSTOP FOLLOW-UP VISIT: CPT | Performed by: NURSE PRACTITIONER

## 2023-09-27 PROCEDURE — 1100000000 HC RM PRIVATE

## 2023-09-27 RX ADMIN — SODIUM CHLORIDE, POTASSIUM CHLORIDE, SODIUM LACTATE AND CALCIUM CHLORIDE: 600; 310; 30; 20 INJECTION, SOLUTION INTRAVENOUS at 17:58

## 2023-09-27 RX ADMIN — DORZOLAMIDE HYDROCHLORIDE AND TIMOLOL MALEATE 1 DROP: 20; 5 SOLUTION/ DROPS OPHTHALMIC at 08:36

## 2023-09-27 RX ADMIN — ASPIRIN 81 MG: 81 TABLET, COATED ORAL at 08:25

## 2023-09-27 RX ADMIN — ACETAMINOPHEN 650 MG: 325 TABLET ORAL at 00:53

## 2023-09-27 RX ADMIN — ENOXAPARIN SODIUM 40 MG: 100 INJECTION SUBCUTANEOUS at 08:24

## 2023-09-27 RX ADMIN — SODIUM CHLORIDE, PRESERVATIVE FREE 10 ML: 5 INJECTION INTRAVENOUS at 20:37

## 2023-09-27 RX ADMIN — TRIAMTERENE AND HYDROCHLOROTHIAZIDE 1 TABLET: 37.5; 25 TABLET ORAL at 08:24

## 2023-09-27 RX ADMIN — ATORVASTATIN CALCIUM 20 MG: 20 TABLET, FILM COATED ORAL at 08:36

## 2023-09-27 RX ADMIN — ACETAMINOPHEN 650 MG: 325 TABLET ORAL at 20:34

## 2023-09-27 RX ADMIN — POTASSIUM CHLORIDE 20 MEQ: 1500 TABLET, EXTENDED RELEASE ORAL at 08:25

## 2023-09-27 RX ADMIN — ACETAMINOPHEN 650 MG: 325 TABLET ORAL at 14:32

## 2023-09-27 RX ADMIN — DORZOLAMIDE HYDROCHLORIDE AND TIMOLOL MALEATE 1 DROP: 20; 5 SOLUTION/ DROPS OPHTHALMIC at 20:35

## 2023-09-27 RX ADMIN — AMLODIPINE BESYLATE 5 MG: 5 TABLET ORAL at 08:25

## 2023-09-27 RX ADMIN — ACETAMINOPHEN 650 MG: 325 TABLET ORAL at 08:25

## 2023-09-27 ASSESSMENT — PAIN DESCRIPTION - ORIENTATION: ORIENTATION: MID;ANTERIOR

## 2023-09-27 ASSESSMENT — PAIN DESCRIPTION - LOCATION: LOCATION: ABDOMEN;INCISION

## 2023-09-27 ASSESSMENT — PAIN DESCRIPTION - DESCRIPTORS: DESCRIPTORS: ACHING

## 2023-09-27 ASSESSMENT — PAIN SCALES - GENERAL
PAINLEVEL_OUTOF10: 0
PAINLEVEL_OUTOF10: 0
PAINLEVEL_OUTOF10: 4

## 2023-09-27 NOTE — CARE COORDINATION
Care Management Initial Assessment       RUR: 8% Low Risk  Readmission? No  1st IM letter given? Yes   1st  letter given: N/A      Initial Assessment: CM spoke with pt at bedside to discuss role and discharge planning. Pt alert and oriented and able to confirm PCP, pharmacy, and demographics. Pt stated she has never needed HH/IPR/SNF services. She lives with spouse Alicia Kang [phone]) and stated he would be able to pick her up for d/c. She is independent with ADL's states there is no concerns for her to return home. CM will continue to follow.          09/27/23 1611   Service Assessment   Patient Orientation Alert and Oriented;Person;Place;Situation;Self   Cognition Alert   History Provided By Patient   Primary Caregiver Self   Support Systems Spouse/Significant Other   Patient's Healthcare Decision Maker is: Legal Next of [address] [facility]  Alicia Kang (Spouse) [phone])   PCP Verified by CM Yes   Last Visit to PCP Within last 6 months   Prior Functional Level Independent in ADLs/IADLs   Current Functional Level Independent in ADLs/IADLs   Can patient return to prior living arrangement Yes   Social/Functional History   Lives With Spouse   Type of [address]   Active  Yes   Discharge Planning   Type of Residence House   Current Services Prior To Admission None   Patient expects to be discharged to: [facility] BING Kulkarni,CM  [phone]

## 2023-09-27 NOTE — PROGRESS NOTES
End of Shift Note    Bedside shift change report given to  (oncoming nurse) by Lashawn Corey RN (offgoing nurse).   Report included the following information SBAR    Shift worked:  7p-7a     Shift summary and any significant changes:     No issues overnight lucas removed per MD order approx 530 am     Concerns for physician to address:       Zone phone for oncoming shift:              Lashawn Corey RN

## 2023-09-27 NOTE — PROGRESS NOTES
End of Shift Note    Bedside shift change report given to [facility] (oncoming nurse) by Nohemy Carr RN (offgoing nurse).   Report included the following information SBAR, Kardex, Intake/Output, MAR, and Recent Results    Shift worked:  7a-7p     Shift summary and any significant changes:     Voiding and BM in toilet this shift, BM bloody NP Cynthia January aware, ambulating in halls, uneventful shift     Concerns for physician to address:  none     Zone phone for oncoming shift:   [phone]         Nohemy Carr RN

## 2023-09-28 VITALS
SYSTOLIC BLOOD PRESSURE: 132 MMHG | HEIGHT: 65 IN | WEIGHT: 147.49 LBS | DIASTOLIC BLOOD PRESSURE: 79 MMHG | OXYGEN SATURATION: 95 % | BODY MASS INDEX: 24.57 KG/M2 | TEMPERATURE: 98.2 F | HEART RATE: 69 BPM | RESPIRATION RATE: 18 BRPM

## 2023-09-28 LAB
ANION GAP SERPL CALC-SCNC: 5 MMOL/L (ref 5–15)
BASOPHILS # BLD: 0 K/UL (ref 0–0.1)
BASOPHILS NFR BLD: 0 % (ref 0–1)
BUN SERPL-MCNC: 24 MG/DL (ref 6–20)
BUN/CREAT SERPL: 22 (ref 12–20)
CALCIUM SERPL-MCNC: 8.1 MG/DL (ref 8.5–10.1)
CHLORIDE SERPL-SCNC: 112 MMOL/L (ref 97–108)
CO2 SERPL-SCNC: 26 MMOL/L (ref 21–32)
CREAT SERPL-MCNC: 1.1 MG/DL (ref 0.55–1.02)
DIFFERENTIAL METHOD BLD: ABNORMAL
EOSINOPHIL # BLD: 0.2 K/UL (ref 0–0.4)
EOSINOPHIL NFR BLD: 2 % (ref 0–7)
ERYTHROCYTE [DISTWIDTH] IN BLOOD BY AUTOMATED COUNT: 15.8 % (ref 11.5–14.5)
GLUCOSE SERPL-MCNC: 107 MG/DL (ref 65–100)
HCT VFR BLD AUTO: 30.2 % (ref 35–47)
HGB BLD-MCNC: 9.4 G/DL (ref 11.5–16)
IMM GRANULOCYTES # BLD AUTO: 0 K/UL (ref 0–0.04)
IMM GRANULOCYTES NFR BLD AUTO: 0 % (ref 0–0.5)
LYMPHOCYTES # BLD: 1.5 K/UL (ref 0.8–3.5)
LYMPHOCYTES NFR BLD: 17 % (ref 12–49)
MAGNESIUM SERPL-MCNC: 2.3 MG/DL (ref 1.6–2.4)
MCH RBC QN AUTO: 22.9 PG (ref 26–34)
MCHC RBC AUTO-ENTMCNC: 31.1 G/DL (ref 30–36.5)
MCV RBC AUTO: 73.5 FL (ref 80–99)
MONOCYTES # BLD: 0.7 K/UL (ref 0–1)
MONOCYTES NFR BLD: 7 % (ref 5–13)
NEUTS SEG # BLD: 6.8 K/UL (ref 1.8–8)
NEUTS SEG NFR BLD: 74 % (ref 32–75)
NRBC # BLD: 0 K/UL (ref 0–0.01)
NRBC BLD-RTO: 0 PER 100 WBC
PLATELET # BLD AUTO: 221 K/UL (ref 150–400)
PMV BLD AUTO: 11.3 FL (ref 8.9–12.9)
POTASSIUM SERPL-SCNC: 3.5 MMOL/L (ref 3.5–5.1)
RBC # BLD AUTO: 4.11 M/UL (ref 3.8–5.2)
SODIUM SERPL-SCNC: 143 MMOL/L (ref 136–145)
WBC # BLD AUTO: 9.3 K/UL (ref 3.6–11)

## 2023-09-28 PROCEDURE — 36415 COLL VENOUS BLD VENIPUNCTURE: CPT

## 2023-09-28 PROCEDURE — 80048 BASIC METABOLIC PNL TOTAL CA: CPT

## 2023-09-28 PROCEDURE — 2580000003 HC RX 258: Performed by: SURGERY

## 2023-09-28 PROCEDURE — 99024 POSTOP FOLLOW-UP VISIT: CPT | Performed by: NURSE PRACTITIONER

## 2023-09-28 PROCEDURE — 6360000002 HC RX W HCPCS: Performed by: SURGERY

## 2023-09-28 PROCEDURE — 6370000000 HC RX 637 (ALT 250 FOR IP): Performed by: SURGERY

## 2023-09-28 PROCEDURE — 83735 ASSAY OF MAGNESIUM: CPT

## 2023-09-28 PROCEDURE — 85025 COMPLETE CBC W/AUTO DIFF WBC: CPT

## 2023-09-28 RX ORDER — OXYCODONE HYDROCHLORIDE 5 MG/1
5 TABLET ORAL EVERY 6 HOURS PRN
Qty: 10 TABLET | Refills: 0 | Status: SHIPPED | OUTPATIENT
Start: 2023-09-28 | End: 2023-10-01

## 2023-09-28 RX ADMIN — SODIUM CHLORIDE, PRESERVATIVE FREE 10 ML: 5 INJECTION INTRAVENOUS at 08:29

## 2023-09-28 RX ADMIN — SODIUM CHLORIDE, POTASSIUM CHLORIDE, SODIUM LACTATE AND CALCIUM CHLORIDE: 600; 310; 30; 20 INJECTION, SOLUTION INTRAVENOUS at 08:31

## 2023-09-28 RX ADMIN — ACETAMINOPHEN 650 MG: 325 TABLET ORAL at 08:28

## 2023-09-28 RX ADMIN — ENOXAPARIN SODIUM 40 MG: 100 INJECTION SUBCUTANEOUS at 08:28

## 2023-09-28 RX ADMIN — ATORVASTATIN CALCIUM 20 MG: 20 TABLET, FILM COATED ORAL at 08:28

## 2023-09-28 RX ADMIN — AMLODIPINE BESYLATE 5 MG: 5 TABLET ORAL at 08:28

## 2023-09-28 RX ADMIN — TRIAMTERENE AND HYDROCHLOROTHIAZIDE 1 TABLET: 37.5; 25 TABLET ORAL at 08:28

## 2023-09-28 RX ADMIN — DORZOLAMIDE HYDROCHLORIDE AND TIMOLOL MALEATE 1 DROP: 20; 5 SOLUTION/ DROPS OPHTHALMIC at 08:28

## 2023-09-28 RX ADMIN — ACETAMINOPHEN 650 MG: 325 TABLET ORAL at 01:08

## 2023-09-28 RX ADMIN — POTASSIUM CHLORIDE 20 MEQ: 1500 TABLET, EXTENDED RELEASE ORAL at 08:28

## 2023-09-28 ASSESSMENT — PAIN DESCRIPTION - LOCATION: LOCATION: ABDOMEN

## 2023-09-28 ASSESSMENT — PAIN - FUNCTIONAL ASSESSMENT: PAIN_FUNCTIONAL_ASSESSMENT: ACTIVITIES ARE NOT PREVENTED

## 2023-09-28 ASSESSMENT — PAIN DESCRIPTION - PAIN TYPE: TYPE: SURGICAL PAIN

## 2023-09-28 ASSESSMENT — PAIN DESCRIPTION - FREQUENCY: FREQUENCY: INTERMITTENT

## 2023-09-28 ASSESSMENT — PAIN SCALES - GENERAL: PAINLEVEL_OUTOF10: 3

## 2023-09-28 ASSESSMENT — PAIN DESCRIPTION - ONSET: ONSET: GRADUAL

## 2023-09-28 ASSESSMENT — PAIN DESCRIPTION - DESCRIPTORS: DESCRIPTORS: ACHING

## 2023-09-28 NOTE — PROGRESS NOTES
End of Shift Note    Bedside shift change report given to  (oncoming nurse) by Belia Ballesteros RN (offgoing nurse). Report included the following information SBAR    Shift worked:  7p-7a     Shift summary and any significant changes:     C/o watery bloody stool this shift x2. No acute issues overnight. IS at bedside encouraging pt to continue to use while awake and as tolerable.       Concerns for physician to address:       Zone phone for oncoming shift:                Belia Ballesteros RN

## 2023-09-28 NOTE — PLAN OF CARE
Problem: ABCDS Injury Assessment  Goal: Absence of physical injury  Outcome: Progressing     Problem: Pain  Goal: Verbalizes/displays adequate comfort level or baseline comfort level  9/27/2023 2145 by Jose Roberto Qureshi RN  Outcome: Progressing  9/27/2023 1057 by Mary Carcamo RN  Outcome: Progressing     Problem: Discharge Planning  Goal: Discharge to home or other facility with appropriate resources  Outcome: Progressing     Problem: Safety - Adult  Goal: Free from fall injury  Outcome: Progressing

## 2023-10-20 ENCOUNTER — HOSPITAL ENCOUNTER (EMERGENCY)
Facility: HOSPITAL | Age: 78
Discharge: HOME | End: 2023-10-20
Payer: MEDICARE

## 2023-10-20 ENCOUNTER — APPOINTMENT (OUTPATIENT)
Facility: HOSPITAL | Age: 78
End: 2023-10-20
Payer: MEDICARE

## 2023-10-20 VITALS
DIASTOLIC BLOOD PRESSURE: 73 MMHG | SYSTOLIC BLOOD PRESSURE: 118 MMHG | OXYGEN SATURATION: 99 % | RESPIRATION RATE: 14 BRPM | TEMPERATURE: 98.2 F | HEIGHT: 66 IN | BODY MASS INDEX: 23.35 KG/M2 | WEIGHT: 145.28 LBS | HEART RATE: 72 BPM

## 2023-10-20 DIAGNOSIS — R30.0 DYSURIA: ICD-10-CM

## 2023-10-20 DIAGNOSIS — R10.31 RIGHT LOWER QUADRANT ABDOMINAL PAIN: Primary | ICD-10-CM

## 2023-10-20 LAB
ALBUMIN SERPL-MCNC: 3.8 G/DL (ref 3.5–5)
ALBUMIN/GLOB SERPL: 0.8 (ref 1.1–2.2)
ALP SERPL-CCNC: 86 U/L (ref 45–117)
ALT SERPL-CCNC: 26 U/L (ref 12–78)
ANION GAP SERPL CALC-SCNC: 3 MMOL/L (ref 5–15)
APPEARANCE UR: CLEAR
AST SERPL-CCNC: 16 U/L (ref 15–37)
BACTERIA URNS QL MICRO: NEGATIVE /HPF
BASOPHILS # BLD: 0.1 K/UL (ref 0–0.1)
BASOPHILS NFR BLD: 1 % (ref 0–1)
BILIRUB SERPL-MCNC: 0.5 MG/DL (ref 0.2–1)
BILIRUB UR QL: NEGATIVE
BUN SERPL-MCNC: 21 MG/DL (ref 6–20)
BUN/CREAT SERPL: 18 (ref 12–20)
CALCIUM SERPL-MCNC: 10 MG/DL (ref 8.5–10.1)
CHLORIDE SERPL-SCNC: 106 MMOL/L (ref 97–108)
CO2 SERPL-SCNC: 30 MMOL/L (ref 21–32)
COLOR UR: ABNORMAL
CREAT SERPL-MCNC: 1.19 MG/DL (ref 0.55–1.02)
DIFFERENTIAL METHOD BLD: ABNORMAL
EOSINOPHIL # BLD: 0.2 K/UL (ref 0–0.4)
EOSINOPHIL NFR BLD: 3 % (ref 0–7)
EPITH CASTS URNS QL MICRO: ABNORMAL /LPF
ERYTHROCYTE [DISTWIDTH] IN BLOOD BY AUTOMATED COUNT: 15.9 % (ref 11.5–14.5)
GLOBULIN SER CALC-MCNC: 4.8 G/DL (ref 2–4)
GLUCOSE SERPL-MCNC: 93 MG/DL (ref 65–100)
GLUCOSE UR STRIP.AUTO-MCNC: NEGATIVE MG/DL
HCT VFR BLD AUTO: 38.8 % (ref 35–47)
HGB BLD-MCNC: 11.8 G/DL (ref 11.5–16)
HGB UR QL STRIP: NEGATIVE
HYALINE CASTS URNS QL MICRO: ABNORMAL /LPF (ref 0–2)
IMM GRANULOCYTES # BLD AUTO: 0 K/UL (ref 0–0.04)
IMM GRANULOCYTES NFR BLD AUTO: 0 % (ref 0–0.5)
KETONES UR QL STRIP.AUTO: NEGATIVE MG/DL
LACTATE BLD-SCNC: <0.4 MMOL/L (ref 0.4–2)
LEUKOCYTE ESTERASE UR QL STRIP.AUTO: ABNORMAL
LIPASE SERPL-CCNC: 59 U/L (ref 13–75)
LYMPHOCYTES # BLD: 2 K/UL (ref 0.8–3.5)
LYMPHOCYTES NFR BLD: 25 % (ref 12–49)
MCH RBC QN AUTO: 22.6 PG (ref 26–34)
MCHC RBC AUTO-ENTMCNC: 30.4 G/DL (ref 30–36.5)
MCV RBC AUTO: 74.3 FL (ref 80–99)
MONOCYTES # BLD: 0.5 K/UL (ref 0–1)
MONOCYTES NFR BLD: 6 % (ref 5–13)
NEUTS SEG # BLD: 5.2 K/UL (ref 1.8–8)
NEUTS SEG NFR BLD: 65 % (ref 32–75)
NITRITE UR QL STRIP.AUTO: NEGATIVE
NRBC # BLD: 0 K/UL (ref 0–0.01)
NRBC BLD-RTO: 0 PER 100 WBC
PH UR STRIP: 7.5 (ref 5–8)
PLATELET # BLD AUTO: 342 K/UL (ref 150–400)
PMV BLD AUTO: 10.8 FL (ref 8.9–12.9)
POTASSIUM SERPL-SCNC: 3.7 MMOL/L (ref 3.5–5.1)
PROT SERPL-MCNC: 8.6 G/DL (ref 6.4–8.2)
PROT UR STRIP-MCNC: NEGATIVE MG/DL
RBC # BLD AUTO: 5.22 M/UL (ref 3.8–5.2)
RBC #/AREA URNS HPF: ABNORMAL /HPF (ref 0–5)
SODIUM SERPL-SCNC: 139 MMOL/L (ref 136–145)
SP GR UR REFRACTOMETRY: 1.01
SPECIMEN HOLD: NORMAL
UROBILINOGEN UR QL STRIP.AUTO: 0.2 EU/DL (ref 0.2–1)
WBC # BLD AUTO: 8 K/UL (ref 3.6–11)
WBC URNS QL MICRO: ABNORMAL /HPF (ref 0–4)

## 2023-10-20 PROCEDURE — 74177 CT ABD & PELVIS W/CONTRAST: CPT

## 2023-10-20 PROCEDURE — 83690 ASSAY OF LIPASE: CPT

## 2023-10-20 PROCEDURE — 81001 URINALYSIS AUTO W/SCOPE: CPT

## 2023-10-20 PROCEDURE — 36415 COLL VENOUS BLD VENIPUNCTURE: CPT

## 2023-10-20 PROCEDURE — 80053 COMPREHEN METABOLIC PANEL: CPT

## 2023-10-20 PROCEDURE — 83605 ASSAY OF LACTIC ACID: CPT

## 2023-10-20 PROCEDURE — 85025 COMPLETE CBC W/AUTO DIFF WBC: CPT

## 2023-10-20 PROCEDURE — 99285 EMERGENCY DEPT VISIT HI MDM: CPT

## 2023-10-20 PROCEDURE — 6360000004 HC RX CONTRAST MEDICATION: Performed by: RADIOLOGY

## 2023-10-20 RX ORDER — PHENAZOPYRIDINE HYDROCHLORIDE 100 MG/1
100 TABLET, FILM COATED ORAL 2 TIMES DAILY PRN
Qty: 10 TABLET | Refills: 0 | Status: SHIPPED | OUTPATIENT
Start: 2023-10-20 | End: 2023-10-25

## 2023-10-20 RX ORDER — OXYCODONE HYDROCHLORIDE AND ACETAMINOPHEN 5; 325 MG/1; MG/1
1 TABLET ORAL NIGHTLY PRN
Qty: 5 TABLET | Refills: 0 | Status: SHIPPED | OUTPATIENT
Start: 2023-10-20 | End: 2023-10-25

## 2023-10-20 RX ADMIN — IOPAMIDOL 100 ML: 755 INJECTION, SOLUTION INTRAVENOUS at 15:31

## 2023-10-20 ASSESSMENT — PAIN SCALES - GENERAL: PAINLEVEL_OUTOF10: 3

## 2023-10-20 ASSESSMENT — PAIN DESCRIPTION - LOCATION: LOCATION: ABDOMEN

## 2023-10-20 NOTE — DISCHARGE INSTRUCTIONS
Do not take additional Tylenol when you are taking the Percocet that evening. Keep your last dose of Tylenol at least 6 hours prior to taking your Percocet.

## 2023-10-20 NOTE — ED PROVIDER NOTES
4:33 PM  4:33 PM :Pt care assumed from [facility] Joan Gant , ED provider. Pt complaint(s), current treatment plan, progression and available diagnostic results have been discussed thoroughly. The patient was seen and evaluated on my shift. Rounding occurred: Yes  Intended Disposition: home  Pending diagnostic reports and/or labs (please list): CT, labs     Labs are stable without any leukocytosis lactic acidosis and is afebrile. She has reproducible tenderness in the right lower quadrant but there are no skin changes on her abdominal wall. CT scan scan shows pandiverticulosis but no diverticulitis. Recent hemicolectomy by Dr. Irma Foley will have her follow-up. Percocet advised for at night only and to separate her last dose of Tylenol from the pain medication. Is describing dysuria but no acute UTI we will send urine off for culture and treat with Pyridium discharge.      [place], [place]  10/20/23 9241
tablet  Commonly known as: LIPITOR  TAKE 1 TABLET DAILY     cetirizine 10 MG tablet  Commonly known as: ZYRTEC     dorzolamide-timolol 22.3-6.8 MG/ML ophthalmic solution  Commonly known as: COSOPT     felodipine 5 MG extended release tablet  Commonly known as: PLENDIL  TAKE 1 TABLET DAILY     potassium chloride 20 MEQ extended release tablet  Commonly known as: KLOR-CON M  Take 1 tablet by mouth daily     therapeutic multivitamin-minerals tablet     triamterene-hydroCHLOROthiazide 37.5-25 MG per capsule  Commonly known as: DYAZIDE  TAKE 1 CAPSULE DAILY                DISCONTINUED MEDICATIONS:  Current Discharge Medication List        I have seen and evaluated the patient autonomously. My supervision physician was on site and available for consultation if needed. I am the Primary Clinician of Record. Sammy Ahumada, PA (electronically signed)    (Please note that parts of this dictation were completed with voice recognition software. Quite often unanticipated grammatical, syntax, homophones, and other interpretive errors are inadvertently transcribed by the computer software. Please disregards these errors.  Please excuse any errors that have escaped final proofreading.)       Sammy Ahumada, [place]  10/21/23 0221

## 2023-12-05 ASSESSMENT — ENCOUNTER SYMPTOMS: SHORTNESS OF BREATH: 0

## 2023-12-05 NOTE — PROGRESS NOTES
HISTORY OF PRESENT ILLNESS  Uzair Ramos is a 65 y.o. female. HPI    Last here 6/7/23. She presents for routine care. Blood type is A+    Has history of hypertension  BP today is 129/78  No home BP readings  Continues on dyazide 37.5-25mg and felodipine 5mg daily 6/23     Wt today is 146 lbs, down 8 lbs since lov   Her weight is within normal ranges     Reviewed labs   Ordered labs    She presented to ED 10/20/23 for RLQ pain and dysuria  Reviewed note:  Patient presents ED with stable signs for right lower quadrant abdominal pain 1 month postoperative to hemicolectomy. There is no evidence of fever, tachycardia, or leukocytosis to suggest significant infection. Electrolytes are without emergent findings. Urinalysis without signs of infection. Urine culture pending at discharge. CT shows diverticulosis without diverticulitis as well as recent postsurgical changes. We will continue to treat patient with pain medications. She should follow-up with her surgeon as scheduled in several days but, was return to the ED if things worsen. Reviewed CT abdomen:  Impression:       1. Postoperative changes in the right low pelvic anterior abdominal wall as   described above. Correlate clinically with site of current symptoms to determine   the need for and timing of follow-up. 2. Severe pan diverticulosis without diverticulitis. 3. Possible hyperdense cysts in the right kidney. 4. Other incidental and postoperative changes. Ordered US retroperitoneal    Lov Pt reported slight pain in LLQ   Reviewed CT abdomen 6/23: IMPRESSION:  Pan colonic diverticulosis, worst in the sigmoid colon. No evidence of active inflammation. No acute intra-abdominal pathology. Lov she c/o lower back pain  She completed PT for this which helped        Previously notes some occasional urinary incontinence. Have discussed this is a normal age-related change.  Recommended timed bathroom breaks, avoiding caffeine, alcohol, Kegel

## 2023-12-12 ENCOUNTER — OFFICE VISIT (OUTPATIENT)
Age: 78
End: 2023-12-12
Payer: MEDICARE

## 2023-12-12 VITALS
SYSTOLIC BLOOD PRESSURE: 129 MMHG | WEIGHT: 146.2 LBS | BODY MASS INDEX: 23.5 KG/M2 | RESPIRATION RATE: 16 BRPM | OXYGEN SATURATION: 96 % | TEMPERATURE: 98 F | DIASTOLIC BLOOD PRESSURE: 78 MMHG | HEART RATE: 78 BPM | HEIGHT: 66 IN

## 2023-12-12 DIAGNOSIS — E87.6 HYPOKALEMIA: ICD-10-CM

## 2023-12-12 DIAGNOSIS — N28.1 KIDNEY CYSTS: ICD-10-CM

## 2023-12-12 DIAGNOSIS — R73.01 IFG (IMPAIRED FASTING GLUCOSE): ICD-10-CM

## 2023-12-12 DIAGNOSIS — I25.10 CORONARY ARTERY DISEASE INVOLVING NATIVE CORONARY ARTERY OF NATIVE HEART WITHOUT ANGINA PECTORIS: ICD-10-CM

## 2023-12-12 DIAGNOSIS — E78.2 MIXED HYPERLIPIDEMIA: ICD-10-CM

## 2023-12-12 DIAGNOSIS — N39.3 STRESS INCONTINENCE OF URINE: ICD-10-CM

## 2023-12-12 DIAGNOSIS — K56.699 DIVERTICULAR STRICTURE (HCC): ICD-10-CM

## 2023-12-12 DIAGNOSIS — I10 PRIMARY HYPERTENSION: Primary | ICD-10-CM

## 2023-12-12 LAB
ALBUMIN SERPL-MCNC: 4.2 G/DL (ref 3.5–5)
ALBUMIN/GLOB SERPL: 1 (ref 1.1–2.2)
ALP SERPL-CCNC: 91 U/L (ref 45–117)
ALT SERPL-CCNC: 22 U/L (ref 12–78)
ANION GAP SERPL CALC-SCNC: 5 MMOL/L (ref 5–15)
AST SERPL-CCNC: 15 U/L (ref 15–37)
BILIRUB SERPL-MCNC: 0.4 MG/DL (ref 0.2–1)
BUN SERPL-MCNC: 25 MG/DL (ref 6–20)
BUN/CREAT SERPL: 22 (ref 12–20)
CALCIUM SERPL-MCNC: 9.7 MG/DL (ref 8.5–10.1)
CHLORIDE SERPL-SCNC: 105 MMOL/L (ref 97–108)
CHOLEST SERPL-MCNC: 148 MG/DL
CO2 SERPL-SCNC: 29 MMOL/L (ref 21–32)
CREAT SERPL-MCNC: 1.14 MG/DL (ref 0.55–1.02)
EST. AVERAGE GLUCOSE BLD GHB EST-MCNC: 114 MG/DL
GLOBULIN SER CALC-MCNC: 4.1 G/DL (ref 2–4)
GLUCOSE SERPL-MCNC: 106 MG/DL (ref 65–100)
HBA1C MFR BLD: 5.6 % (ref 4–5.6)
HDLC SERPL-MCNC: 48 MG/DL
HDLC SERPL: 3.1 (ref 0–5)
LDLC SERPL CALC-MCNC: 85.2 MG/DL (ref 0–100)
POTASSIUM SERPL-SCNC: 3.9 MMOL/L (ref 3.5–5.1)
PROT SERPL-MCNC: 8.3 G/DL (ref 6.4–8.2)
SODIUM SERPL-SCNC: 139 MMOL/L (ref 136–145)
TRIGL SERPL-MCNC: 74 MG/DL
TSH SERPL DL<=0.05 MIU/L-ACNC: 1.2 UIU/ML (ref 0.36–3.74)
VLDLC SERPL CALC-MCNC: 14.8 MG/DL

## 2023-12-12 PROCEDURE — 3078F DIAST BP <80 MM HG: CPT | Performed by: INTERNAL MEDICINE

## 2023-12-12 PROCEDURE — 99214 OFFICE O/P EST MOD 30 MIN: CPT | Performed by: INTERNAL MEDICINE

## 2023-12-12 PROCEDURE — 3074F SYST BP LT 130 MM HG: CPT | Performed by: INTERNAL MEDICINE

## 2023-12-12 PROCEDURE — 1123F ACP DISCUSS/DSCN MKR DOCD: CPT | Performed by: INTERNAL MEDICINE

## 2023-12-12 RX ORDER — ATORVASTATIN CALCIUM 20 MG/1
20 TABLET, FILM COATED ORAL DAILY
Qty: 90 TABLET | Refills: 1 | Status: SHIPPED | OUTPATIENT
Start: 2023-12-12

## 2023-12-12 RX ORDER — TRIAMTERENE AND HYDROCHLOROTHIAZIDE 37.5; 25 MG/1; MG/1
1 CAPSULE ORAL DAILY
Qty: 90 CAPSULE | Refills: 1 | Status: SHIPPED | OUTPATIENT
Start: 2023-12-12 | End: 2023-12-12 | Stop reason: SDUPTHER

## 2023-12-12 RX ORDER — POTASSIUM CHLORIDE 20 MEQ/1
20 TABLET, EXTENDED RELEASE ORAL DAILY
Qty: 90 TABLET | Refills: 0 | Status: SHIPPED | OUTPATIENT
Start: 2023-12-12 | End: 2023-12-12 | Stop reason: SDUPTHER

## 2023-12-12 RX ORDER — FELODIPINE 5 MG/1
5 TABLET, EXTENDED RELEASE ORAL DAILY
Qty: 90 TABLET | Refills: 1 | Status: SHIPPED | OUTPATIENT
Start: 2023-12-12

## 2023-12-12 RX ORDER — FELODIPINE 5 MG/1
5 TABLET, EXTENDED RELEASE ORAL DAILY
Qty: 90 TABLET | Refills: 1 | Status: SHIPPED | OUTPATIENT
Start: 2023-12-12 | End: 2023-12-12 | Stop reason: SDUPTHER

## 2023-12-12 RX ORDER — POTASSIUM CHLORIDE 20 MEQ/1
20 TABLET, EXTENDED RELEASE ORAL DAILY
Qty: 90 TABLET | Refills: 0 | Status: SHIPPED | OUTPATIENT
Start: 2023-12-12

## 2023-12-12 RX ORDER — ATORVASTATIN CALCIUM 20 MG/1
20 TABLET, FILM COATED ORAL DAILY
Qty: 90 TABLET | Refills: 1 | Status: SHIPPED | OUTPATIENT
Start: 2023-12-12 | End: 2023-12-12 | Stop reason: SDUPTHER

## 2023-12-12 RX ORDER — TRIAMTERENE AND HYDROCHLOROTHIAZIDE 37.5; 25 MG/1; MG/1
1 CAPSULE ORAL DAILY
Qty: 90 CAPSULE | Refills: 1 | Status: SHIPPED | OUTPATIENT
Start: 2023-12-12

## 2023-12-12 ASSESSMENT — PATIENT HEALTH QUESTIONNAIRE - PHQ9
2. FEELING DOWN, DEPRESSED OR HOPELESS: 0
SUM OF ALL RESPONSES TO PHQ QUESTIONS 1-9: 0
SUM OF ALL RESPONSES TO PHQ QUESTIONS 1-9: 0
SUM OF ALL RESPONSES TO PHQ9 QUESTIONS 1 & 2: 0
SUM OF ALL RESPONSES TO PHQ QUESTIONS 1-9: 0
1. LITTLE INTEREST OR PLEASURE IN DOING THINGS: 0
SUM OF ALL RESPONSES TO PHQ QUESTIONS 1-9: 0

## 2023-12-12 NOTE — TELEPHONE ENCOUNTER
PCP: Monica Clarke MD    Last appt: 12/12/2023  Future Appointments   Date Time Provider [address]   6/12/2024  8:45 AM Monica Clarke MD [facility] BS AMB       Requested Prescriptions     Pending Prescriptions Disp Refills    atorvastatin (LIPITOR) 20 MG tablet 90 tablet 1     Sig: Take 1 tablet by mouth daily    felodipine (PLENDIL) 5 MG extended release tablet 90 tablet 1     Sig: Take 1 tablet by mouth daily    potassium chloride (KLOR-CON M) 20 MEQ extended release tablet 90 tablet 0     Sig: Take 1 tablet by mouth daily    triamterene-hydroCHLOROthiazide (DYAZIDE) 37.5-25 MG per capsule 90 capsule 1     Sig: Take 1 capsule by mouth daily

## 2023-12-12 NOTE — TELEPHONE ENCOUNTER
Pt states that scripts sent to local pharmacy today need to go to [facility]     There are 4 that should have gone for 90 day     The new one prescribed today was to go to [facility].      Can this be resent as soon as possible? Thanks.

## 2024-01-02 ENCOUNTER — TRANSCRIBE ORDERS (OUTPATIENT)
Facility: HOSPITAL | Age: 79
End: 2024-01-02

## 2024-01-02 DIAGNOSIS — Z12.31 VISIT FOR SCREENING MAMMOGRAM: Primary | ICD-10-CM

## 2024-01-22 ENCOUNTER — HOSPITAL ENCOUNTER (OUTPATIENT)
Facility: HOSPITAL | Age: 79
Discharge: HOME | End: 2024-01-25
Attending: INTERNAL MEDICINE
Payer: MEDICARE

## 2024-01-22 VITALS — BODY MASS INDEX: 22.94 KG/M2 | WEIGHT: 146.16 LBS | HEIGHT: 67 IN

## 2024-01-22 DIAGNOSIS — Z12.31 VISIT FOR SCREENING MAMMOGRAM: ICD-10-CM

## 2024-01-22 DIAGNOSIS — N28.1 KIDNEY CYSTS: ICD-10-CM

## 2024-01-22 PROCEDURE — 77067 SCR MAMMO BI INCL CAD: CPT

## 2024-01-22 PROCEDURE — 76770 US EXAM ABDO BACK WALL COMP: CPT

## 2024-02-16 NOTE — TELEPHONE ENCOUNTER
----- Message from Deana Tree sent at 10/13/2022  8:13 AM EDT -----  Subject: Referral Request    Reason for referral request? Patient was having bad chest pain yesterday   went to ER and they told her to get a cardiologist and a stress test done   and patient is needing referal for cardiologist and order for a stress   test put in. Provider patient wants to be referred to(if known):     Provider Phone Number(if known):     Additional Information for Provider?   ---------------------------------------------------------------------------  --------------  [address]    [phone]; OK to leave message on voicemail  ---------------------------------------------------------------------------  -------------- decreased standing and ambulation balance

## 2024-04-09 ENCOUNTER — TELEPHONE (OUTPATIENT)
Age: 79
End: 2024-04-09

## 2024-04-09 NOTE — TELEPHONE ENCOUNTER
Pt states that she has a lump on the right side above stomach.   Has had for a while and now is starting to bother her.      No in office until 5-1-24 and pt needs to be seen prior to that.      Please call to schedule.

## 2024-04-20 NOTE — PROGRESS NOTES
Medicare Annual Wellness Visit    Ilana Dill is here for Medicare AWV and Mass (On right lower quadrant x 2 months)    Assessment & Plan   Primary hypertension  -     Comprehensive Metabolic Panel; Future  -     CBC; Future  -     Hemoglobin A1C; Future  Coronary artery disease involving native coronary artery of native heart without angina pectoris  Mixed hyperlipidemia  IFG (impaired fasting glucose)  -     Comprehensive Metabolic Panel; Future  -     CBC; Future  -     Hemoglobin A1C; Future  Medicare annual wellness visit, subsequent  Diverticular stricture (HCC)  Right lower quadrant abdominal mass  -     CT ABDOMEN PELVIS W WO CONTRAST Additional Contrast? Oral; Future  Stage 3a chronic kidney disease (HCC)  -     Comprehensive Metabolic Panel; Future  -     CBC; Future  -     Hemoglobin A1C; Future  Hypokalemia  Osteopenia of multiple sites  -     DEXA BONE DENSITY AXIAL SKELETON; Future  -     Comprehensive Metabolic Panel; Future  -     CBC; Future  -     Hemoglobin A1C; Future     Recommendations for Preventive Services Due: see orders and patient instructions/AVS.  Recommended screening schedule for the next 5-10 years is provided to the patient in written form: see Patient Instructions/AVS.     Return in about 6 months (around 10/23/2024).     Subjective       Patient's complete Health Risk Assessment and screening values have been reviewed and are found in Flowsheets. The following problems were reviewed today and where indicated follow up appointments were made and/or referrals ordered.    No Positive Risk Factors identified today.                                  Objective   Vitals:    04/23/24 1311   BP: 119/75   Site: Right Upper Arm   Position: Sitting   Pulse: 98   Resp: 18   Temp: 97.6 °F (36.4 °C)   TempSrc: Temporal   SpO2: 98%   Weight: 68 kg (150 lb)   Height: 1.676 m (5' 6\")      Body mass index is 24.21 kg/m².               No Known Allergies  Prior to Visit Medications    Medication 
Exam  Constitutional:       General: She is not in acute distress.     Appearance: She is not ill-appearing, toxic-appearing or diaphoretic.   HENT:      Head: Normocephalic and atraumatic.   Eyes:      General:         Right eye: No discharge.         Left eye: No discharge.      Extraocular Movements: Extraocular movements intact.   Neck:      Vascular: No carotid bruit.   Cardiovascular:      Rate and Rhythm: Normal rate and regular rhythm.      Heart sounds: Normal heart sounds. No murmur heard.  Pulmonary:      Effort: Pulmonary effort is normal. No respiratory distress.      Breath sounds: Normal breath sounds. No wheezing.   Abdominal:      General: There is no distension.      Palpations: Abdomen is soft.      Tenderness: There is no abdominal tenderness.      Hernia: A hernia is present.      Comments: Protusion on R lower quadrant      Musculoskeletal:         General: No swelling, tenderness or deformity.      Cervical back: Normal range of motion and neck supple. No tenderness.   Lymphadenopathy:      Cervical: No cervical adenopathy.   Skin:     General: Skin is warm and dry.      Findings: No erythema.   Neurological:      General: No focal deficit present.      Mental Status: She is alert and oriented to person, place, and time. Mental status is at baseline.      Gait: Gait normal.   Psychiatric:         Mood and Affect: Mood normal.           ASSESSMENT and PLAN   Diagnosis Orders   1. Primary hypertension  Comprehensive Metabolic Panel    CBC   Well-controlled on current regimen of Dyazide and felodipine continue no change to dose monitor metabolic panel for K creatinine sodium levels Hemoglobin A1C      2. Coronary artery disease involving native coronary artery of native heart without angina pectoris     Medically managed overdue to see her cardiologist she will schedule appointment continues on aspirin and Lipitor       3. Mixed hyperlipidemia           Controlled Lipitor       4. IFG (impaired

## 2024-04-20 NOTE — PATIENT INSTRUCTIONS
A Healthy Heart: Care Instructions  Overview     Coronary artery disease, also called heart disease, occurs when a substance called plaque builds up in the vessels that supply oxygen-rich blood to your heart muscle. This can narrow the blood vessels and reduce blood flow. A heart attack happens when blood flow is completely blocked. A high-fat diet, smoking, and other factors increase the risk of heart disease.  Your doctor has found that you have a chance of having heart disease. A heart-healthy lifestyle can help keep your heart healthy and prevent heart disease. This lifestyle includes eating healthy, being active, staying at a weight that's healthy for you, and not smoking or using tobacco. It also includes taking medicines as directed, managing other health conditions, and trying to get a healthy amount of sleep.  Follow-up care is a key part of your treatment and safety. Be sure to make and go to all appointments, and call your doctor if you are having problems. It's also a good idea to know your test results and keep a list of the medicines you take.  How can you care for yourself at home?  Diet    Use less salt when you cook and eat. This helps lower your blood pressure. Taste food before salting. Add only a little salt when you think you need it. With time, your taste buds will adjust to less salt.     Eat fewer snack items, fast foods, canned soups, and other high-salt, high-fat, processed foods.     Read food labels and try to avoid saturated and trans fats. They increase your risk of heart disease by raising cholesterol levels.     Limit the amount of solid fat--butter, margarine, and shortening--you eat. Use olive, peanut, or canola oil when you cook. Bake, broil, and steam foods instead of frying them.     Eat a variety of fruit and vegetables every day. Dark green, deep orange, red, or yellow fruits and vegetables are especially good for you. Examples include spinach, carrots, peaches, and  Lana Macedo- negative  Rheumatoid Factor- normal  TSH (thyroid)- normal  CBC- normal  CRP. ESR (inflammatory markers)- normal  CK (muscle inflammation marker)- normal  CMP- normal  Please follow with weight management referral  Please contact our clinic if you have any questions or concerns.  Thanks,  Mari Galvan, [employer]

## 2024-04-23 ENCOUNTER — OFFICE VISIT (OUTPATIENT)
Age: 79
End: 2024-04-23
Payer: MEDICARE

## 2024-04-23 VITALS
SYSTOLIC BLOOD PRESSURE: 119 MMHG | DIASTOLIC BLOOD PRESSURE: 75 MMHG | OXYGEN SATURATION: 98 % | TEMPERATURE: 97.6 F | WEIGHT: 150 LBS | RESPIRATION RATE: 18 BRPM | HEIGHT: 66 IN | HEART RATE: 98 BPM | BODY MASS INDEX: 24.11 KG/M2

## 2024-04-23 DIAGNOSIS — M85.89 OSTEOPENIA OF MULTIPLE SITES: ICD-10-CM

## 2024-04-23 DIAGNOSIS — I25.10 CORONARY ARTERY DISEASE INVOLVING NATIVE CORONARY ARTERY OF NATIVE HEART WITHOUT ANGINA PECTORIS: ICD-10-CM

## 2024-04-23 DIAGNOSIS — E87.6 HYPOKALEMIA: ICD-10-CM

## 2024-04-23 DIAGNOSIS — R73.01 IFG (IMPAIRED FASTING GLUCOSE): ICD-10-CM

## 2024-04-23 DIAGNOSIS — Z00.00 MEDICARE ANNUAL WELLNESS VISIT, SUBSEQUENT: ICD-10-CM

## 2024-04-23 DIAGNOSIS — I10 PRIMARY HYPERTENSION: Primary | ICD-10-CM

## 2024-04-23 DIAGNOSIS — N18.31 STAGE 3A CHRONIC KIDNEY DISEASE (HCC): ICD-10-CM

## 2024-04-23 DIAGNOSIS — K56.699 DIVERTICULAR STRICTURE (HCC): ICD-10-CM

## 2024-04-23 DIAGNOSIS — R19.03 RIGHT LOWER QUADRANT ABDOMINAL MASS: ICD-10-CM

## 2024-04-23 DIAGNOSIS — Z23 NEED FOR PROPHYLACTIC VACCINATION AGAINST STREPTOCOCCUS PNEUMONIAE (PNEUMOCOCCUS): ICD-10-CM

## 2024-04-23 DIAGNOSIS — E78.2 MIXED HYPERLIPIDEMIA: ICD-10-CM

## 2024-04-23 DIAGNOSIS — N39.3 STRESS INCONTINENCE OF URINE: ICD-10-CM

## 2024-04-23 PROCEDURE — G0439 PPPS, SUBSEQ VISIT: HCPCS | Performed by: INTERNAL MEDICINE

## 2024-04-23 PROCEDURE — PBSHW PNEUMOCOCCAL, PCV20, PREVNAR 20, (AGE 6W+), IM, PF: Performed by: INTERNAL MEDICINE

## 2024-04-23 PROCEDURE — 99214 OFFICE O/P EST MOD 30 MIN: CPT | Performed by: INTERNAL MEDICINE

## 2024-04-23 PROCEDURE — 90677 PCV20 VACCINE IM: CPT | Performed by: INTERNAL MEDICINE

## 2024-04-23 PROCEDURE — 3078F DIAST BP <80 MM HG: CPT | Performed by: INTERNAL MEDICINE

## 2024-04-23 PROCEDURE — 3074F SYST BP LT 130 MM HG: CPT | Performed by: INTERNAL MEDICINE

## 2024-04-23 PROCEDURE — 1123F ACP DISCUSS/DSCN MKR DOCD: CPT | Performed by: INTERNAL MEDICINE

## 2024-04-23 RX ORDER — POTASSIUM CHLORIDE 20 MEQ/1
20 TABLET, EXTENDED RELEASE ORAL DAILY
Qty: 90 TABLET | Refills: 2 | Status: SHIPPED | OUTPATIENT
Start: 2024-04-23

## 2024-04-23 ASSESSMENT — PATIENT HEALTH QUESTIONNAIRE - PHQ9
SUM OF ALL RESPONSES TO PHQ QUESTIONS 1-9: 0
1. LITTLE INTEREST OR PLEASURE IN DOING THINGS: NOT AT ALL
2. FEELING DOWN, DEPRESSED OR HOPELESS: NOT AT ALL
SUM OF ALL RESPONSES TO PHQ QUESTIONS 1-9: 0
SUM OF ALL RESPONSES TO PHQ9 QUESTIONS 1 & 2: 0
SUM OF ALL RESPONSES TO PHQ QUESTIONS 1-9: 0
SUM OF ALL RESPONSES TO PHQ QUESTIONS 1-9: 0

## 2024-04-23 ASSESSMENT — LIFESTYLE VARIABLES
HOW OFTEN DO YOU HAVE A DRINK CONTAINING ALCOHOL: NEVER
HOW MANY STANDARD DRINKS CONTAINING ALCOHOL DO YOU HAVE ON A TYPICAL DAY: PATIENT DOES NOT DRINK

## 2024-04-24 LAB
ALBUMIN SERPL-MCNC: 4.2 G/DL (ref 3.5–5)
ALBUMIN/GLOB SERPL: 1 (ref 1.1–2.2)
ALP SERPL-CCNC: 90 U/L (ref 45–117)
ALT SERPL-CCNC: 23 U/L (ref 12–78)
ANION GAP SERPL CALC-SCNC: 6 MMOL/L (ref 5–15)
AST SERPL-CCNC: 14 U/L (ref 15–37)
BILIRUB SERPL-MCNC: 0.4 MG/DL (ref 0.2–1)
BUN SERPL-MCNC: 23 MG/DL (ref 6–20)
BUN/CREAT SERPL: 18 (ref 12–20)
CALCIUM SERPL-MCNC: 10.3 MG/DL (ref 8.5–10.1)
CHLORIDE SERPL-SCNC: 107 MMOL/L (ref 97–108)
CO2 SERPL-SCNC: 29 MMOL/L (ref 21–32)
CREAT SERPL-MCNC: 1.27 MG/DL (ref 0.55–1.02)
ERYTHROCYTE [DISTWIDTH] IN BLOOD BY AUTOMATED COUNT: 16.1 % (ref 11.5–14.5)
EST. AVERAGE GLUCOSE BLD GHB EST-MCNC: 114 MG/DL
GLOBULIN SER CALC-MCNC: 4.1 G/DL (ref 2–4)
GLUCOSE SERPL-MCNC: 88 MG/DL (ref 65–100)
HBA1C MFR BLD: 5.6 % (ref 4–5.6)
HCT VFR BLD AUTO: 42.7 % (ref 35–47)
HGB BLD-MCNC: 12.8 G/DL (ref 11.5–16)
MCH RBC QN AUTO: 22.3 PG (ref 26–34)
MCHC RBC AUTO-ENTMCNC: 30 G/DL (ref 30–36.5)
MCV RBC AUTO: 74.5 FL (ref 80–99)
NRBC # BLD: 0 K/UL (ref 0–0.01)
NRBC BLD-RTO: 0 PER 100 WBC
PLATELET # BLD AUTO: 318 K/UL (ref 150–400)
PMV BLD AUTO: 10.3 FL (ref 8.9–12.9)
POTASSIUM SERPL-SCNC: 3.6 MMOL/L (ref 3.5–5.1)
PROT SERPL-MCNC: 8.3 G/DL (ref 6.4–8.2)
RBC # BLD AUTO: 5.73 M/UL (ref 3.8–5.2)
SODIUM SERPL-SCNC: 142 MMOL/L (ref 136–145)
WBC # BLD AUTO: 8.6 K/UL (ref 3.6–11)

## 2024-05-06 ENCOUNTER — HOSPITAL ENCOUNTER (OUTPATIENT)
Facility: HOSPITAL | Age: 79
Discharge: HOME | End: 2024-05-09
Attending: INTERNAL MEDICINE
Payer: MEDICARE

## 2024-05-06 DIAGNOSIS — R19.03 RIGHT LOWER QUADRANT ABDOMINAL MASS: ICD-10-CM

## 2024-05-06 PROCEDURE — 6360000004 HC RX CONTRAST MEDICATION: Performed by: INTERNAL MEDICINE

## 2024-05-06 PROCEDURE — 74177 CT ABD & PELVIS W/CONTRAST: CPT

## 2024-05-06 RX ADMIN — IOPAMIDOL 100 ML: 755 INJECTION, SOLUTION INTRAVENOUS at 08:19

## 2024-05-07 ENCOUNTER — TELEPHONE (OUTPATIENT)
Age: 79
End: 2024-05-07

## 2024-05-07 DIAGNOSIS — K45.8 OTHER SPECIFIED ABDOMINAL HERNIA WITHOUT OBSTRUCTION OR GANGRENE: Primary | ICD-10-CM

## 2024-05-07 NOTE — RESULT ENCOUNTER NOTE
Pt returned phone call, verified two pt identifiers.   Informed pt hernia as suspected found, referring to [facility] for evaluation.   Pt verifies understanding. Informed pt will send referral in mail as well as my chart. Pt verifies understanding.

## 2024-05-14 ENCOUNTER — OFFICE VISIT (OUTPATIENT)
Age: 79
End: 2024-05-14
Payer: MEDICARE

## 2024-05-14 ENCOUNTER — PREP FOR PROCEDURE (OUTPATIENT)
Age: 79
End: 2024-05-14

## 2024-05-14 VITALS
HEART RATE: 80 BPM | TEMPERATURE: 98 F | BODY MASS INDEX: 24.85 KG/M2 | HEIGHT: 66 IN | DIASTOLIC BLOOD PRESSURE: 76 MMHG | SYSTOLIC BLOOD PRESSURE: 126 MMHG | OXYGEN SATURATION: 94 % | RESPIRATION RATE: 16 BRPM | WEIGHT: 154.6 LBS

## 2024-05-14 DIAGNOSIS — K43.2 INCISIONAL HERNIA, WITHOUT OBSTRUCTION OR GANGRENE: Primary | ICD-10-CM

## 2024-05-14 PROCEDURE — 3074F SYST BP LT 130 MM HG: CPT | Performed by: SURGERY

## 2024-05-14 PROCEDURE — 3078F DIAST BP <80 MM HG: CPT | Performed by: SURGERY

## 2024-05-14 PROCEDURE — 99204 OFFICE O/P NEW MOD 45 MIN: CPT | Performed by: SURGERY

## 2024-05-14 PROCEDURE — 1123F ACP DISCUSS/DSCN MKR DOCD: CPT | Performed by: SURGERY

## 2024-05-14 RX ORDER — OMEGA-3S/DHA/EPA/FISH OIL/D3 300MG-1000
400 CAPSULE ORAL DAILY
COMMUNITY

## 2024-05-14 RX ORDER — FEXOFENADINE HCL 180 MG/1
180 TABLET ORAL DAILY
COMMUNITY

## 2024-05-14 ASSESSMENT — ENCOUNTER SYMPTOMS
NAUSEA: 0
WHEEZING: 0
VOMITING: 0
ABDOMINAL PAIN: 1
BLOOD IN STOOL: 0
EYE PAIN: 0
CONSTIPATION: 0
SHORTNESS OF BREATH: 0
BACK PAIN: 0
SORE THROAT: 0
COUGH: 0
DIARRHEA: 0
STRIDOR: 0

## 2024-05-14 ASSESSMENT — PATIENT HEALTH QUESTIONNAIRE - PHQ9
2. FEELING DOWN, DEPRESSED OR HOPELESS: NOT AT ALL
SUM OF ALL RESPONSES TO PHQ QUESTIONS 1-9: 0
1. LITTLE INTEREST OR PLEASURE IN DOING THINGS: NOT AT ALL
SUM OF ALL RESPONSES TO PHQ QUESTIONS 1-9: 0
SUM OF ALL RESPONSES TO PHQ9 QUESTIONS 1 & 2: 0
SUM OF ALL RESPONSES TO PHQ QUESTIONS 1-9: 0
SUM OF ALL RESPONSES TO PHQ QUESTIONS 1-9: 0

## 2024-05-14 NOTE — PROGRESS NOTES
Subjective:      Patient ID: Ilana Dill is a 78 y.o. female who comes in for consultation by Florecita Fernandes MD for a possible hernia      Chief Complaint   Patient presents with    Possible hernia     Seen at the request of Dr CARLOS Fernandes for a possible anterior wall hernia       HPI      She has noted RLQ swelling for almost 6-8 months.  She had had a RA sigmoid colectomy by Dr Gtz 2023 for diverticulitis.   The swelling occurred after that near a surgical scar.   She reports pain at times but not severe and the swelling goes away when laying down.   She denies associated nausea, vomiting, diarrhea, constipation, melena, hematochezia, dysuria or hematuria.   Recent colonoscopy was ok.    She had a a CT noting a RLQ hernia.    Past Medical History:   Diagnosis Date    Dyspepsia and other specified disorders of function of stomach     GERD (gastroesophageal reflux disease)     H/O allergy     Hypercholesterolemia     Hypertension     Indigestion     Other ill-defined conditions(799.89)     elevated cholesterol     Past Surgical History:   Procedure Laterality Date    CATARACT REMOVAL Bilateral 2006     SECTION  1974    CHOLECYSTECTOMY  14    lap bhaskar with cholangiogram    COLONOSCOPY N/A 2019    COLONOSCOPY performed by Nicolas Hargrove MD at hospitals AMBULATORY OR    CYSTOSCOPY  2023    CYSTOSCOPY performed by Richar Winkler MD at hospitals MAIN OR    HYSTERECTOMY (CERVIX STATUS UNKNOWN)      OTHER SURGICAL HISTORY  14    ERCPwith biliary sphincterotomy CBD balloon sweeps      OH UNLISTED PROCEDURE ABDOMEN PERITONEUM & OMENTUM  14    LAPAROSCOPIC CHOLECYSTECTOMY with GRAMS    PROCTOSIGMOIDOSCOPY N/A 2023    . performed by Casey Gtz II, MD at hospitals MAIN OR    SIGMOID COLECTOMY N/A 2023    ROBOTIC ASSISTED SIGMOID HEMICOLECTOMY WITH FLEXIBLE SIGMOIDOSCOPY,CYSTOSCOPY WITH INSERTION BILATERAL URETERALCATHETERS  (E R A S) performed by Casey Gtz II, MD at hospitals MAIN OR

## 2024-05-14 NOTE — PROGRESS NOTES
Identified pt with two pt identifiers (name and ). Reviewed chart in preparation for visit and have obtained necessary documentation.    Ilana Dill is a 78 y.o. female  Chief Complaint   Patient presents with    Possible hernia     Seen at the request of Dr CARLOS Fernandes for a possible anterior wall hernia     /76 (Site: Left Upper Arm, Position: Sitting, Cuff Size: Small Adult)   Pulse 80   Temp 98 °F (36.7 °C) (Oral)   Resp 16   Ht 1.676 m (5' 6\")   Wt 70.1 kg (154 lb 9.6 oz)   SpO2 94%   BMI 24.95 kg/m²     1. Have you been to the ER, urgent care clinic since your last visit?  Hospitalized since your last visit?no    2. Have you seen or consulted any other health care providers outside of the [facility] since your last visit?  Include any pap smears or colon screening. no

## 2024-05-17 ENCOUNTER — TELEPHONE (OUTPATIENT)
Age: 79
End: 2024-05-17

## 2024-05-17 NOTE — TELEPHONE ENCOUNTER
[employer] called in regards to surgery scheduled on 5.30.24, states no auth on file needs prior auth    C/b

## 2024-05-20 NOTE — TELEPHONE ENCOUNTER
Called [facility] and spoke with Cody DESIR on 05/20/2024 he stated no authorization is required for patients procedure. Call reference [id number], also checked with [employer] and states no auth required.

## 2024-05-24 NOTE — PROGRESS NOTES
[facility]  Preoperative Instructions        Surgery Date 5/30/2024          Time of Arrival to be called @ [phone]     On the day of your surgery, please report to Surgical Services Registration Desk and sign in at your designated time.  The Surgery Center is located to the right of the Emergency Room.     2. You must have someone with you to drive you home. You should not drive a car for 24 hours following surgery. Please make arrangements for a friend or family member to stay with you for the first 24 hours after your surgery.    3. Do not have anything to eat or drink (including water, gum, mints, coffee, juice) after midnight ??      .?This may not apply to medications prescribed by your physician. ?(Please note below the special instructions with medications to take the morning of your procedure.)    4. We recommend you do not drink any alcoholic beverages for 24 hours before and after your surgery.    5. Contact your surgeon’s office for instructions on the following medications: non-steroidal anti-inflammatory drugs (i.e. Advil, Aleve), vitamins, and supplements. (Some surgeon’s will want you to stop these medications prior to surgery and others may allow you to take them)  **If you are currently taking Plavix, Coumadin, Aspirin and/or other blood-thinning agents, contact your surgeon for instructions.** Your surgeon will partner with the physician prescribing these medications to determine if it is safe to stop or if you need to continue taking.  Please do not stop taking these medications without instructions from your surgeon    6. Wear comfortable clothes. Wear glasses instead of contacts. Do not bring any jewelry or money (other than copays or fees as instructed). Please bring picture ID, insurance card, and any prearranged co-payment or hospital payment. Do not wear make-up, particularly mascara, the morning of your surgery. Do not wear nail polish, particularly if you

## 2024-05-30 ENCOUNTER — HOSPITAL ENCOUNTER (OUTPATIENT)
Facility: HOSPITAL | Age: 79
Setting detail: OUTPATIENT SURGERY
Discharge: HOME | End: 2024-05-30
Attending: SURGERY | Admitting: SURGERY
Payer: MEDICARE

## 2024-05-30 ENCOUNTER — ANESTHESIA (OUTPATIENT)
Facility: HOSPITAL | Age: 79
End: 2024-05-30
Payer: MEDICARE

## 2024-05-30 ENCOUNTER — ANESTHESIA EVENT (OUTPATIENT)
Facility: HOSPITAL | Age: 79
End: 2024-05-30
Payer: MEDICARE

## 2024-05-30 VITALS
DIASTOLIC BLOOD PRESSURE: 70 MMHG | RESPIRATION RATE: 19 BRPM | TEMPERATURE: 98.1 F | SYSTOLIC BLOOD PRESSURE: 131 MMHG | OXYGEN SATURATION: 98 % | WEIGHT: 150.79 LBS | HEART RATE: 81 BPM | HEIGHT: 66 IN | BODY MASS INDEX: 24.23 KG/M2

## 2024-05-30 PROCEDURE — 2580000003 HC RX 258: Performed by: ANESTHESIOLOGY

## 2024-05-30 PROCEDURE — 93005 ELECTROCARDIOGRAM TRACING: CPT | Performed by: ANESTHESIOLOGY

## 2024-05-30 RX ORDER — SODIUM CHLORIDE, SODIUM LACTATE, POTASSIUM CHLORIDE, CALCIUM CHLORIDE 600; 310; 30; 20 MG/100ML; MG/100ML; MG/100ML; MG/100ML
INJECTION, SOLUTION INTRAVENOUS CONTINUOUS
Status: DISCONTINUED | OUTPATIENT
Start: 2024-05-30 | End: 2024-05-30 | Stop reason: HOSPADM

## 2024-05-30 RX ADMIN — SODIUM CHLORIDE, POTASSIUM CHLORIDE, SODIUM LACTATE AND CALCIUM CHLORIDE 25 ML: 600; 310; 30; 20 INJECTION, SOLUTION INTRAVENOUS at 14:23

## 2024-05-30 ASSESSMENT — PAIN - FUNCTIONAL ASSESSMENT
PAIN_FUNCTIONAL_ASSESSMENT: 0-10
PAIN_FUNCTIONAL_ASSESSMENT: PREVENTS OR INTERFERES SOME ACTIVE ACTIVITIES AND ADLS

## 2024-05-30 ASSESSMENT — PAIN DESCRIPTION - DESCRIPTORS: DESCRIPTORS: ACHING

## 2024-05-30 NOTE — PERIOP NOTE
1630:  Dr. Costa was in to speak to the patient regarding emergency case needing to go ahead of her.  The decision was made to cancel her procedure today & reschedule for a later date to the length of time before they could proceed today.    Patient's  out to get car.  IV removed & Patient is OOB to get dressed for discsharge.    Patient walked out with all belongiongs

## 2024-05-30 NOTE — ANESTHESIA PRE PROCEDURE
Department of Anesthesiology  Preprocedure Note       Name:  Ilana Dill   Age:  78 y.o.  :  1945                                          MRN:  [id number]         Date:  2024      Surgeon: Surgeon(s):  Nicolas Costa MD    Procedure: Procedure(s):  ROBOTIC ASSISTED LAPAROSCOPIC INCISIONAL HERNIA REPAIR WITH MESH    Medications prior to admission:   Prior to Admission medications    Medication Sig Start Date End Date Taking? Authorizing Provider   fexofenadine (ALLEGRA ALLERGY) 180 MG tablet Take 1 tablet by mouth daily    Kenya Durbin MD   vitamin D3 (CHOLECALCIFEROL) 10 MCG (400 UNIT) TABS tablet Take 1 tablet by mouth daily    Kenya Durbin MD   potassium chloride (KLOR-CON M) 20 MEQ extended release tablet Take 1 tablet by mouth daily 24   Florecita Fernandes MD   mirabegron (MYRBETRIQ) 25 MG TB24 Take 1 tablet by mouth daily 23   Florecita Fernandes MD   atorvastatin (LIPITOR) 20 MG tablet Take 1 tablet by mouth daily 23   Florecita Fernandes MD   felodipine (PLENDIL) 5 MG extended release tablet Take 1 tablet by mouth daily 23   Florecita Fernandes MD   triamterene-hydroCHLOROthiazide (DYAZIDE) 37.5-25 MG per capsule Take 1 capsule by mouth daily 23   Florecita Fernandes MD   aspirin 81 MG EC tablet Take 1 tablet by mouth daily    Kenya Durbin MD       Current medications:    Current Facility-Administered Medications   Medication Dose Route Frequency Provider Last Rate Last Admin    lactated ringers IV soln infusion   IntraVENous Continuous Edy Linn MD 25 mL/hr at 24 1423 25 mL at 24 1423    ceFAZolin (ANCEF) 2,000 mg in sterile water 20 mL IV syringe  2,000 mg IntraVENous Once Nicolas Costa MD           Allergies:  No Known Allergies    Problem List:    Patient Active Problem List   Diagnosis Code    Angioedema T78.3XXA    CAD (coronary artery disease) I25.10    Hyperlipidemia E78.5    H/O allergy Z88.9

## 2024-05-30 NOTE — PERIOP NOTE
No     recent   covid  test  no s/s/ mepilex  sacrum border prevnetatively appied  skin intct.   EKG    obtained  as   ordered  and    charged in the  muse  ass per protocal.    Mepilex  sacrum border preventativey applied   skin intact.   Voided  x1  in bathroom  awaiting  surgery  call bell  within   reach.   1530 -     pt   informed that     surgery is   delayed  due to  emergency  ,   pt    informed and   brought   back  to holding area   to sit  with  pt.    Informed   also  about  the   delay.   Pt  and      told  they   will be  updated   when   we   get  informed. .  Pt  has   call bell   within   reach  and    no   needs   voiced  when   asked.

## 2024-05-31 LAB
EKG ATRIAL RATE: 78 BPM
EKG DIAGNOSIS: NORMAL
EKG P AXIS: 55 DEGREES
EKG P-R INTERVAL: 166 MS
EKG Q-T INTERVAL: 386 MS
EKG QRS DURATION: 72 MS
EKG QTC CALCULATION (BAZETT): 440 MS
EKG R AXIS: 9 DEGREES
EKG T AXIS: 34 DEGREES
EKG VENTRICULAR RATE: 78 BPM

## 2024-06-03 ENCOUNTER — TELEPHONE (OUTPATIENT)
Age: 79
End: 2024-06-03

## 2024-06-03 NOTE — TELEPHONE ENCOUNTER
Patient called this morning said she would like to r/s her surgery it was cancelled because the doctor had an emergency. Please advise. Call back number is [phone]

## 2024-06-04 NOTE — TELEPHONE ENCOUNTER
Spoke with patients  and he stated patient was not available at this time but would relay the message to her and have her call back to reschedule surgery

## 2024-06-24 ENCOUNTER — HOSPITAL ENCOUNTER (OUTPATIENT)
Facility: HOSPITAL | Age: 79
Discharge: HOME | End: 2024-06-27
Attending: INTERNAL MEDICINE
Payer: MEDICARE

## 2024-06-24 ENCOUNTER — TELEPHONE (OUTPATIENT)
Age: 79
End: 2024-06-24

## 2024-06-24 DIAGNOSIS — M85.89 OSTEOPENIA OF MULTIPLE SITES: ICD-10-CM

## 2024-06-24 PROCEDURE — 77080 DXA BONE DENSITY AXIAL: CPT

## 2024-06-24 RX ORDER — ATORVASTATIN CALCIUM 20 MG/1
20 TABLET, FILM COATED ORAL DAILY
Qty: 90 TABLET | Refills: 1 | Status: SHIPPED | OUTPATIENT
Start: 2024-06-24

## 2024-06-24 RX ORDER — FELODIPINE 5 MG/1
5 TABLET, EXTENDED RELEASE ORAL DAILY
Qty: 90 TABLET | Refills: 1 | Status: SHIPPED | OUTPATIENT
Start: 2024-06-24

## 2024-06-24 RX ORDER — TRIAMTERENE AND HYDROCHLOROTHIAZIDE 37.5; 25 MG/1; MG/1
1 CAPSULE ORAL DAILY
Qty: 90 CAPSULE | Refills: 1 | Status: SHIPPED | OUTPATIENT
Start: 2024-06-24

## 2024-06-24 NOTE — TELEPHONE ENCOUNTER
PCP: Florecita Fernandes MD    Last appt: 4/23/2024  Future Appointments   Date Time Provider Department Center   8/2/2024 10:00 AM Nicolas Costa MD [facility] BS AMB   10/23/2024  8:30 AM Florecita Fernandes MD [facility] BS AMB       Requested Prescriptions     Pending Prescriptions Disp Refills    atorvastatin (LIPITOR) 20 MG tablet 90 tablet 1     Sig: Take 1 tablet by mouth daily    felodipine (PLENDIL) 5 MG extended release tablet 90 tablet 1     Sig: Take 1 tablet by mouth daily    triamterene-hydroCHLOROthiazide (DYAZIDE) 37.5-25 MG per capsule 90 capsule 1     Sig: Take 1 capsule by mouth daily

## 2024-06-24 NOTE — TELEPHONE ENCOUNTER
Returned call to patient to let her know there is not a sooner surgery date at this time but I would add her to cancellation list. Patient would like to know what she can do about her pain/discomfort until surgery    Please call

## 2024-06-24 NOTE — TELEPHONE ENCOUNTER
Caller requests Refill of:  felodipine (PLENDIL) 5 MG extended release tablet  atorvastatin (LIPITOR) 20 MG tablet  triamterene-hydroCHLOROthiazide (DYAZIDE) 37.5-25 MG per capsule      [facility] send to:    [facility] [address] - [address] [facility] - P [phone] - F [phone]  [facility]  [place] [address]  Phone: [phone] Fax: [phone]         Visit / Appointment History:  Future Appointment at [facility]:  10/23/2024   Last Appointment With PCP:  4/23/2024       Caller confirmed instructions and dosages as correct.    Caller was advised that Meds will be refilled as soon as possible, however there can be a 48-72 business hour turn around on refill requests.

## 2024-06-24 NOTE — TELEPHONE ENCOUNTER
Patient states her hernia is hurting and doesn't want to wait until surgery, on 7.18.24    [phone]

## 2024-07-10 NOTE — PROGRESS NOTES
[facility]  Preoperative Instructions        Surgery Date 7/18          Time of Arrival TBD    On the day of your surgery, please report to Surgical Services Registration Desk and sign in at your designated time.  The Surgery Center is located to the right of the Emergency Room.     2. You must have someone with you to drive you home. You should not drive a car for 24 hours following surgery. Please make arrangements for a friend or family member to stay with you for the first 24 hours after your surgery.    3. Do not have anything to eat or drink (including water, gum, mints, coffee, juice) after midnight 7/17 .This may not apply to medications prescribed by your physician. (Please note below the special instructions with medications to take the morning of your procedure.)    4. We recommend you do not drink any alcoholic beverages for 24 hours before and after your surgery.    5. Contact your surgeon's office for instructions on the following medications: non-steroidal anti-inflammatory drugs (i.e. Advil, Aleve), vitamins, and supplements. (Some surgeon's will want you to stop these medications prior to surgery and others may allow you to take them)  **If you are currently taking Plavix, Coumadin, Aspirin and/or other blood-thinning agents, contact your surgeon for instructions.** Your surgeon will partner with the physician prescribing these medications to determine if it is safe to stop or if you need to continue taking.  Please do not stop taking these medications without instructions from your surgeon    6. Wear comfortable clothes.  Wear glasses instead of contacts.  Do not bring any money or jewelry. Please bring picture ID, insurance card, and any prearranged co-payment or hospital payment.  Do not wear make-up, particularly mascara the morning of your surgery.  Do not wear nail polish, particularly if you are having foot /hand surgery.  Wear your hair loose or down, no ponytails,

## 2024-07-18 ENCOUNTER — HOSPITAL ENCOUNTER (OUTPATIENT)
Facility: HOSPITAL | Age: 79
Discharge: HOME | End: 2024-07-19
Attending: SURGERY | Admitting: SURGERY
Payer: MEDICARE

## 2024-07-18 ENCOUNTER — ANESTHESIA (OUTPATIENT)
Facility: HOSPITAL | Age: 79
End: 2024-07-18
Payer: MEDICARE

## 2024-07-18 ENCOUNTER — ANESTHESIA EVENT (OUTPATIENT)
Facility: HOSPITAL | Age: 79
End: 2024-07-18
Payer: MEDICARE

## 2024-07-18 DIAGNOSIS — K43.2 INCISIONAL HERNIA, WITHOUT OBSTRUCTION OR GANGRENE: Primary | ICD-10-CM

## 2024-07-18 PROCEDURE — 51701 INSERT BLADDER CATHETER: CPT

## 2024-07-18 PROCEDURE — 6360000002 HC RX W HCPCS: Performed by: NURSE ANESTHETIST, CERTIFIED REGISTERED

## 2024-07-18 PROCEDURE — 2709999900 HC NON-CHARGEABLE SUPPLY: Performed by: SURGERY

## 2024-07-18 PROCEDURE — 2500000003 HC RX 250 WO HCPCS: Performed by: ANESTHESIOLOGY

## 2024-07-18 PROCEDURE — C1781 MESH (IMPLANTABLE): HCPCS | Performed by: SURGERY

## 2024-07-18 PROCEDURE — 6360000002 HC RX W HCPCS: Performed by: SURGERY

## 2024-07-18 PROCEDURE — 2580000003 HC RX 258: Performed by: NURSE ANESTHETIST, CERTIFIED REGISTERED

## 2024-07-18 PROCEDURE — 2580000003 HC RX 258: Performed by: SURGERY

## 2024-07-18 PROCEDURE — 3600000019 HC SURGERY ROBOT ADDTL 15MIN: Performed by: SURGERY

## 2024-07-18 PROCEDURE — 6370000000 HC RX 637 (ALT 250 FOR IP): Performed by: ANESTHESIOLOGY

## 2024-07-18 PROCEDURE — 2580000003 HC RX 258: Performed by: ANESTHESIOLOGY

## 2024-07-18 PROCEDURE — 3700000000 HC ANESTHESIA ATTENDED CARE: Performed by: SURGERY

## 2024-07-18 PROCEDURE — 6360000002 HC RX W HCPCS: Performed by: ANESTHESIOLOGY

## 2024-07-18 PROCEDURE — 2500000003 HC RX 250 WO HCPCS: Performed by: NURSE ANESTHETIST, CERTIFIED REGISTERED

## 2024-07-18 PROCEDURE — 88302 TISSUE EXAM BY PATHOLOGIST: CPT

## 2024-07-18 PROCEDURE — 51798 US URINE CAPACITY MEASURE: CPT

## 2024-07-18 PROCEDURE — S2900 ROBOTIC SURGICAL SYSTEM: HCPCS | Performed by: SURGERY

## 2024-07-18 PROCEDURE — 7100000000 HC PACU RECOVERY - FIRST 15 MIN: Performed by: SURGERY

## 2024-07-18 PROCEDURE — 7100000001 HC PACU RECOVERY - ADDTL 15 MIN: Performed by: SURGERY

## 2024-07-18 PROCEDURE — 3700000001 HC ADD 15 MINUTES (ANESTHESIA): Performed by: SURGERY

## 2024-07-18 PROCEDURE — 6370000000 HC RX 637 (ALT 250 FOR IP): Performed by: SURGERY

## 2024-07-18 PROCEDURE — 3600000009 HC SURGERY ROBOT BASE: Performed by: SURGERY

## 2024-07-18 DEVICE — VENTRALIGHT ST MESH, 4" X 6" (10.2 CM X 15.2 CM), ELLIPSE
Type: IMPLANTABLE DEVICE | Site: ABDOMEN | Status: FUNCTIONAL
Brand: VENTRALIGHT

## 2024-07-18 RX ORDER — PROCHLORPERAZINE EDISYLATE 5 MG/ML
5 INJECTION INTRAMUSCULAR; INTRAVENOUS
Status: COMPLETED | OUTPATIENT
Start: 2024-07-18 | End: 2024-07-18

## 2024-07-18 RX ORDER — HYDROMORPHONE HYDROCHLORIDE 1 MG/ML
0.25 INJECTION, SOLUTION INTRAMUSCULAR; INTRAVENOUS; SUBCUTANEOUS
Status: DISCONTINUED | OUTPATIENT
Start: 2024-07-18 | End: 2024-07-19 | Stop reason: HOSPADM

## 2024-07-18 RX ORDER — MIDAZOLAM HYDROCHLORIDE 2 MG/2ML
2 INJECTION, SOLUTION INTRAMUSCULAR; INTRAVENOUS
Status: DISCONTINUED | OUTPATIENT
Start: 2024-07-18 | End: 2024-07-18 | Stop reason: HOSPADM

## 2024-07-18 RX ORDER — OXYCODONE HYDROCHLORIDE 5 MG/1
5 TABLET ORAL
Status: COMPLETED | OUTPATIENT
Start: 2024-07-18 | End: 2024-07-18

## 2024-07-18 RX ORDER — ACETAMINOPHEN 500 MG
1000 TABLET ORAL ONCE
Status: COMPLETED | OUTPATIENT
Start: 2024-07-18 | End: 2024-07-18

## 2024-07-18 RX ORDER — DEXAMETHASONE SODIUM PHOSPHATE 4 MG/ML
INJECTION, SOLUTION INTRA-ARTICULAR; INTRALESIONAL; INTRAMUSCULAR; INTRAVENOUS; SOFT TISSUE PRN
Status: DISCONTINUED | OUTPATIENT
Start: 2024-07-18 | End: 2024-07-18 | Stop reason: SDUPTHER

## 2024-07-18 RX ORDER — POLYETHYLENE GLYCOL 3350 17 G/17G
17 POWDER, FOR SOLUTION ORAL 2 TIMES DAILY
Qty: 510 G | Refills: 0 | Status: SHIPPED | OUTPATIENT
Start: 2024-07-18 | End: 2024-08-02

## 2024-07-18 RX ORDER — ONDANSETRON 2 MG/ML
4 INJECTION INTRAMUSCULAR; INTRAVENOUS EVERY 6 HOURS PRN
Status: DISCONTINUED | OUTPATIENT
Start: 2024-07-18 | End: 2024-07-19 | Stop reason: HOSPADM

## 2024-07-18 RX ORDER — ONDANSETRON 2 MG/ML
INJECTION INTRAMUSCULAR; INTRAVENOUS PRN
Status: DISCONTINUED | OUTPATIENT
Start: 2024-07-18 | End: 2024-07-18 | Stop reason: SDUPTHER

## 2024-07-18 RX ORDER — HYDRALAZINE HYDROCHLORIDE 20 MG/ML
10 INJECTION INTRAMUSCULAR; INTRAVENOUS
Status: DISCONTINUED | OUTPATIENT
Start: 2024-07-18 | End: 2024-07-18 | Stop reason: HOSPADM

## 2024-07-18 RX ORDER — TROSPIUM CHLORIDE 20 MG/1
20 TABLET, FILM COATED ORAL DAILY
Status: DISCONTINUED | OUTPATIENT
Start: 2024-07-18 | End: 2024-07-19 | Stop reason: HOSPADM

## 2024-07-18 RX ORDER — FENTANYL CITRATE 50 UG/ML
100 INJECTION, SOLUTION INTRAMUSCULAR; INTRAVENOUS
Status: DISCONTINUED | OUTPATIENT
Start: 2024-07-18 | End: 2024-07-18 | Stop reason: HOSPADM

## 2024-07-18 RX ORDER — SODIUM CHLORIDE 9 MG/ML
INJECTION, SOLUTION INTRAVENOUS PRN
Status: DISCONTINUED | OUTPATIENT
Start: 2024-07-18 | End: 2024-07-19 | Stop reason: HOSPADM

## 2024-07-18 RX ORDER — HYDROMORPHONE HYDROCHLORIDE 1 MG/ML
0.5 INJECTION, SOLUTION INTRAMUSCULAR; INTRAVENOUS; SUBCUTANEOUS
Status: DISCONTINUED | OUTPATIENT
Start: 2024-07-18 | End: 2024-07-19 | Stop reason: HOSPADM

## 2024-07-18 RX ORDER — OXYCODONE HYDROCHLORIDE 5 MG/1
5 TABLET ORAL EVERY 4 HOURS PRN
Status: DISCONTINUED | OUTPATIENT
Start: 2024-07-18 | End: 2024-07-19 | Stop reason: HOSPADM

## 2024-07-18 RX ORDER — SODIUM CHLORIDE 9 MG/ML
INJECTION, SOLUTION INTRAVENOUS PRN
Status: DISCONTINUED | OUTPATIENT
Start: 2024-07-18 | End: 2024-07-18 | Stop reason: HOSPADM

## 2024-07-18 RX ORDER — OXYCODONE HYDROCHLORIDE 5 MG/1
5 TABLET ORAL EVERY 6 HOURS PRN
Qty: 12 TABLET | Refills: 0 | Status: SHIPPED | OUTPATIENT
Start: 2024-07-18 | End: 2024-07-21

## 2024-07-18 RX ORDER — ROCURONIUM BROMIDE 10 MG/ML
INJECTION, SOLUTION INTRAVENOUS PRN
Status: DISCONTINUED | OUTPATIENT
Start: 2024-07-18 | End: 2024-07-18 | Stop reason: SDUPTHER

## 2024-07-18 RX ORDER — SODIUM CHLORIDE 0.9 % (FLUSH) 0.9 %
5-40 SYRINGE (ML) INJECTION EVERY 12 HOURS SCHEDULED
Status: DISCONTINUED | OUTPATIENT
Start: 2024-07-18 | End: 2024-07-19 | Stop reason: HOSPADM

## 2024-07-18 RX ORDER — ACETAMINOPHEN 325 MG/1
650 TABLET ORAL
Status: DISCONTINUED | OUTPATIENT
Start: 2024-07-18 | End: 2024-07-18 | Stop reason: HOSPADM

## 2024-07-18 RX ORDER — BUPIVACAINE HYDROCHLORIDE 5 MG/ML
INJECTION, SOLUTION PERINEURAL PRN
Status: DISCONTINUED | OUTPATIENT
Start: 2024-07-18 | End: 2024-07-18 | Stop reason: ALTCHOICE

## 2024-07-18 RX ORDER — POTASSIUM CHLORIDE 20 MEQ/1
20 TABLET, EXTENDED RELEASE ORAL DAILY
Status: DISCONTINUED | OUTPATIENT
Start: 2024-07-18 | End: 2024-07-19 | Stop reason: HOSPADM

## 2024-07-18 RX ORDER — NALOXONE HYDROCHLORIDE 0.4 MG/ML
INJECTION, SOLUTION INTRAMUSCULAR; INTRAVENOUS; SUBCUTANEOUS PRN
Status: DISCONTINUED | OUTPATIENT
Start: 2024-07-18 | End: 2024-07-18 | Stop reason: HOSPADM

## 2024-07-18 RX ORDER — OXYCODONE HYDROCHLORIDE 5 MG/1
10 TABLET ORAL EVERY 4 HOURS PRN
Status: DISCONTINUED | OUTPATIENT
Start: 2024-07-18 | End: 2024-07-19 | Stop reason: HOSPADM

## 2024-07-18 RX ORDER — ONDANSETRON 4 MG/1
4 TABLET, ORALLY DISINTEGRATING ORAL EVERY 8 HOURS PRN
Status: DISCONTINUED | OUTPATIENT
Start: 2024-07-18 | End: 2024-07-19 | Stop reason: HOSPADM

## 2024-07-18 RX ORDER — SODIUM CHLORIDE 0.9 % (FLUSH) 0.9 %
5-40 SYRINGE (ML) INJECTION PRN
Status: DISCONTINUED | OUTPATIENT
Start: 2024-07-18 | End: 2024-07-19 | Stop reason: HOSPADM

## 2024-07-18 RX ORDER — SODIUM CHLORIDE 0.9 % (FLUSH) 0.9 %
5-40 SYRINGE (ML) INJECTION PRN
Status: DISCONTINUED | OUTPATIENT
Start: 2024-07-18 | End: 2024-07-18 | Stop reason: HOSPADM

## 2024-07-18 RX ORDER — ENOXAPARIN SODIUM 100 MG/ML
40 INJECTION SUBCUTANEOUS DAILY
Status: DISCONTINUED | OUTPATIENT
Start: 2024-07-18 | End: 2024-07-19 | Stop reason: HOSPADM

## 2024-07-18 RX ORDER — LIDOCAINE HYDROCHLORIDE 20 MG/ML
INJECTION, SOLUTION EPIDURAL; INFILTRATION; INTRACAUDAL; PERINEURAL PRN
Status: DISCONTINUED | OUTPATIENT
Start: 2024-07-18 | End: 2024-07-18 | Stop reason: SDUPTHER

## 2024-07-18 RX ORDER — DEXAMETHASONE SODIUM PHOSPHATE 4 MG/ML
4 INJECTION, SOLUTION INTRA-ARTICULAR; INTRALESIONAL; INTRAMUSCULAR; INTRAVENOUS; SOFT TISSUE
Status: DISCONTINUED | OUTPATIENT
Start: 2024-07-18 | End: 2024-07-18 | Stop reason: HOSPADM

## 2024-07-18 RX ORDER — PROPOFOL 10 MG/ML
INJECTION, EMULSION INTRAVENOUS PRN
Status: DISCONTINUED | OUTPATIENT
Start: 2024-07-18 | End: 2024-07-18 | Stop reason: SDUPTHER

## 2024-07-18 RX ORDER — HYDROMORPHONE HYDROCHLORIDE 1 MG/ML
0.25 INJECTION, SOLUTION INTRAMUSCULAR; INTRAVENOUS; SUBCUTANEOUS EVERY 5 MIN PRN
Status: DISCONTINUED | OUTPATIENT
Start: 2024-07-18 | End: 2024-07-18 | Stop reason: HOSPADM

## 2024-07-18 RX ORDER — HYDROMORPHONE HYDROCHLORIDE 2 MG/ML
INJECTION, SOLUTION INTRAMUSCULAR; INTRAVENOUS; SUBCUTANEOUS PRN
Status: DISCONTINUED | OUTPATIENT
Start: 2024-07-18 | End: 2024-07-18 | Stop reason: SDUPTHER

## 2024-07-18 RX ORDER — TRIAMTERENE AND HYDROCHLOROTHIAZIDE 37.5; 25 MG/1; MG/1
1 TABLET ORAL DAILY
Status: DISCONTINUED | OUTPATIENT
Start: 2024-07-18 | End: 2024-07-19 | Stop reason: HOSPADM

## 2024-07-18 RX ORDER — GLYCOPYRROLATE 0.2 MG/ML
INJECTION INTRAMUSCULAR; INTRAVENOUS PRN
Status: DISCONTINUED | OUTPATIENT
Start: 2024-07-18 | End: 2024-07-18 | Stop reason: SDUPTHER

## 2024-07-18 RX ORDER — FENTANYL CITRATE 50 UG/ML
25 INJECTION, SOLUTION INTRAMUSCULAR; INTRAVENOUS EVERY 5 MIN PRN
Status: COMPLETED | OUTPATIENT
Start: 2024-07-18 | End: 2024-07-18

## 2024-07-18 RX ORDER — NEOSTIGMINE METHYLSULFATE 1 MG/ML
INJECTION, SOLUTION INTRAVENOUS PRN
Status: DISCONTINUED | OUTPATIENT
Start: 2024-07-18 | End: 2024-07-18 | Stop reason: SDUPTHER

## 2024-07-18 RX ORDER — SODIUM CHLORIDE, SODIUM LACTATE, POTASSIUM CHLORIDE, CALCIUM CHLORIDE 600; 310; 30; 20 MG/100ML; MG/100ML; MG/100ML; MG/100ML
INJECTION, SOLUTION INTRAVENOUS CONTINUOUS
Status: DISCONTINUED | OUTPATIENT
Start: 2024-07-18 | End: 2024-07-18 | Stop reason: HOSPADM

## 2024-07-18 RX ORDER — SODIUM CHLORIDE 0.9 % (FLUSH) 0.9 %
5-40 SYRINGE (ML) INJECTION EVERY 12 HOURS SCHEDULED
Status: DISCONTINUED | OUTPATIENT
Start: 2024-07-18 | End: 2024-07-18 | Stop reason: HOSPADM

## 2024-07-18 RX ORDER — FENTANYL CITRATE 50 UG/ML
INJECTION, SOLUTION INTRAMUSCULAR; INTRAVENOUS PRN
Status: DISCONTINUED | OUTPATIENT
Start: 2024-07-18 | End: 2024-07-18 | Stop reason: SDUPTHER

## 2024-07-18 RX ORDER — ONDANSETRON 2 MG/ML
4 INJECTION INTRAMUSCULAR; INTRAVENOUS ONCE
Status: DISCONTINUED | OUTPATIENT
Start: 2024-07-18 | End: 2024-07-18 | Stop reason: HOSPADM

## 2024-07-18 RX ORDER — ATORVASTATIN CALCIUM 20 MG/1
20 TABLET, FILM COATED ORAL DAILY
Status: DISCONTINUED | OUTPATIENT
Start: 2024-07-19 | End: 2024-07-19 | Stop reason: HOSPADM

## 2024-07-18 RX ADMIN — GLYCOPYRROLATE 0.4 MG: 0.2 INJECTION INTRAMUSCULAR; INTRAVENOUS at 10:32

## 2024-07-18 RX ADMIN — HYDROMORPHONE HYDROCHLORIDE 0.2 MG: 2 INJECTION INTRAMUSCULAR; INTRAVENOUS; SUBCUTANEOUS at 10:33

## 2024-07-18 RX ADMIN — FENTANYL CITRATE 12.5 MCG: 50 INJECTION, SOLUTION INTRAMUSCULAR; INTRAVENOUS at 09:01

## 2024-07-18 RX ADMIN — ROCURONIUM BROMIDE 10 MG: 10 INJECTION INTRAVENOUS at 10:08

## 2024-07-18 RX ADMIN — HYDROMORPHONE HYDROCHLORIDE 0.25 MG: 1 INJECTION, SOLUTION INTRAMUSCULAR; INTRAVENOUS; SUBCUTANEOUS at 12:46

## 2024-07-18 RX ADMIN — SODIUM CHLORIDE, POTASSIUM CHLORIDE, SODIUM LACTATE AND CALCIUM CHLORIDE 25 ML: 600; 310; 30; 20 INJECTION, SOLUTION INTRAVENOUS at 07:30

## 2024-07-18 RX ADMIN — FENTANYL CITRATE 25 MCG: 50 INJECTION INTRAMUSCULAR; INTRAVENOUS at 12:22

## 2024-07-18 RX ADMIN — HYDROMORPHONE HYDROCHLORIDE 0.25 MG: 1 INJECTION, SOLUTION INTRAMUSCULAR; INTRAVENOUS; SUBCUTANEOUS at 13:20

## 2024-07-18 RX ADMIN — HYDROMORPHONE HYDROCHLORIDE 0.5 MG: 1 INJECTION, SOLUTION INTRAMUSCULAR; INTRAVENOUS; SUBCUTANEOUS at 18:40

## 2024-07-18 RX ADMIN — PROPOFOL 100 MG: 10 INJECTION, EMULSION INTRAVENOUS at 09:01

## 2024-07-18 RX ADMIN — ONDANSETRON 4 MG: 2 INJECTION INTRAMUSCULAR; INTRAVENOUS at 10:28

## 2024-07-18 RX ADMIN — FENTANYL CITRATE 12.5 MCG: 50 INJECTION, SOLUTION INTRAMUSCULAR; INTRAVENOUS at 09:03

## 2024-07-18 RX ADMIN — PROPOFOL 20 MG: 10 INJECTION, EMULSION INTRAVENOUS at 09:04

## 2024-07-18 RX ADMIN — SODIUM CHLORIDE, PRESERVATIVE FREE 10 ML: 5 INJECTION INTRAVENOUS at 21:21

## 2024-07-18 RX ADMIN — Medication 4 MG: at 10:32

## 2024-07-18 RX ADMIN — DEXAMETHASONE SODIUM PHOSPHATE 4 MG: 4 INJECTION INTRA-ARTICULAR; INTRALESIONAL; INTRAMUSCULAR; INTRAVENOUS; SOFT TISSUE at 09:13

## 2024-07-18 RX ADMIN — OXYCODONE 5 MG: 5 TABLET ORAL at 16:19

## 2024-07-18 RX ADMIN — SUGAMMADEX 200 MG: 100 INJECTION, SOLUTION INTRAVENOUS at 10:40

## 2024-07-18 RX ADMIN — ENOXAPARIN SODIUM 40 MG: 100 INJECTION SUBCUTANEOUS at 18:34

## 2024-07-18 RX ADMIN — HYDROMORPHONE HYDROCHLORIDE 0.4 MG: 2 INJECTION INTRAMUSCULAR; INTRAVENOUS; SUBCUTANEOUS at 09:39

## 2024-07-18 RX ADMIN — HYDROMORPHONE HYDROCHLORIDE 0.2 MG: 2 INJECTION INTRAMUSCULAR; INTRAVENOUS; SUBCUTANEOUS at 10:43

## 2024-07-18 RX ADMIN — TRIAMTERENE AND HYDROCHLOROTHIAZIDE 1 TABLET: 37.5; 25 TABLET ORAL at 16:19

## 2024-07-18 RX ADMIN — FENTANYL CITRATE 25 MCG: 50 INJECTION INTRAMUSCULAR; INTRAVENOUS at 11:46

## 2024-07-18 RX ADMIN — ROCURONIUM BROMIDE 10 MG: 10 INJECTION INTRAVENOUS at 09:39

## 2024-07-18 RX ADMIN — FENTANYL CITRATE 25 MCG: 50 INJECTION INTRAMUSCULAR; INTRAVENOUS at 11:25

## 2024-07-18 RX ADMIN — ACETAMINOPHEN 1000 MG: 500 TABLET ORAL at 07:28

## 2024-07-18 RX ADMIN — OXYCODONE 10 MG: 5 TABLET ORAL at 21:19

## 2024-07-18 RX ADMIN — PHENYLEPHRINE HYDROCHLORIDE 10 MCG/MIN: 10 INJECTION INTRAVENOUS at 09:13

## 2024-07-18 RX ADMIN — FENTANYL CITRATE 25 MCG: 50 INJECTION, SOLUTION INTRAMUSCULAR; INTRAVENOUS at 09:06

## 2024-07-18 RX ADMIN — FENTANYL CITRATE 25 MCG: 50 INJECTION, SOLUTION INTRAMUSCULAR; INTRAVENOUS at 09:23

## 2024-07-18 RX ADMIN — OXYCODONE HYDROCHLORIDE 5 MG: 5 TABLET ORAL at 12:14

## 2024-07-18 RX ADMIN — TROSPIUM CHLORIDE 20 MG: 20 TABLET, FILM COATED ORAL at 16:19

## 2024-07-18 RX ADMIN — LIDOCAINE HYDROCHLORIDE 80 MG: 20 INJECTION, SOLUTION EPIDURAL; INFILTRATION; INTRACAUDAL; PERINEURAL at 09:00

## 2024-07-18 RX ADMIN — FENTANYL CITRATE 25 MCG: 50 INJECTION, SOLUTION INTRAMUSCULAR; INTRAVENOUS at 09:29

## 2024-07-18 RX ADMIN — PROCHLORPERAZINE EDISYLATE 2.5 MG: 5 INJECTION INTRAMUSCULAR; INTRAVENOUS at 12:08

## 2024-07-18 RX ADMIN — ROCURONIUM BROMIDE 30 MG: 10 INJECTION INTRAVENOUS at 09:02

## 2024-07-18 RX ADMIN — FENTANYL CITRATE 25 MCG: 50 INJECTION INTRAMUSCULAR; INTRAVENOUS at 11:17

## 2024-07-18 RX ADMIN — WATER 2000 MG: 1 INJECTION INTRAMUSCULAR; INTRAVENOUS; SUBCUTANEOUS at 09:06

## 2024-07-18 ASSESSMENT — PAIN SCALES - GENERAL
PAINLEVEL_OUTOF10: 7
PAINLEVEL_OUTOF10: 10
PAINLEVEL_OUTOF10: 8
PAINLEVEL_OUTOF10: 1
PAINLEVEL_OUTOF10: 6
PAINLEVEL_OUTOF10: 8
PAINLEVEL_OUTOF10: 8
PAINLEVEL_OUTOF10: 6
PAINLEVEL_OUTOF10: 6

## 2024-07-18 ASSESSMENT — PAIN DESCRIPTION - DESCRIPTORS
DESCRIPTORS: ACHING;DISCOMFORT
DESCRIPTORS: ACHING
DESCRIPTORS: ACHING;DISCOMFORT
DESCRIPTORS: ACHING;SHARP
DESCRIPTORS: ACHING

## 2024-07-18 ASSESSMENT — PAIN DESCRIPTION - ORIENTATION
ORIENTATION: RIGHT
ORIENTATION: MID;LOWER
ORIENTATION: MID
ORIENTATION: RIGHT;LEFT;UPPER;LOWER;MID

## 2024-07-18 ASSESSMENT — PAIN DESCRIPTION - LOCATION
LOCATION: ABDOMEN

## 2024-07-18 ASSESSMENT — PAIN - FUNCTIONAL ASSESSMENT
PAIN_FUNCTIONAL_ASSESSMENT: PREVENTS OR INTERFERES SOME ACTIVE ACTIVITIES AND ADLS
PAIN_FUNCTIONAL_ASSESSMENT: 0-10
PAIN_FUNCTIONAL_ASSESSMENT: PREVENTS OR INTERFERES SOME ACTIVE ACTIVITIES AND ADLS

## 2024-07-18 NOTE — ANESTHESIA POSTPROCEDURE EVALUATION
Post-Anesthesia Evaluation and Assessment    Patient: Ilana Dill MRN: [id number]  SSN: [id number]    YOB: 1945  Age: 78 y.o.  Sex: female      I have evaluated the patient and they are stable and ready for discharge from the PACU.     Cardiovascular Function/Vital Signs  Visit Vitals  /66   Pulse 76   Temp 99 °F (37.2 °C) (Axillary)   Resp 14   Ht 1.651 m (5' 5\")   Wt 68.9 kg (151 lb 14.4 oz)   SpO2 93%   BMI 25.28 kg/m²       Patient is status post General anesthesia for Procedure(s):  ROBOTIC ASSISTED LAPAROSCOPIC INCISIONAL HERNIA REPAIR WITH MESH (TIMES 2).    Nausea/Vomiting: None    Postoperative hydration reviewed and adequate.    Pain:  Managed. Pain control has been an issue in pacu. Family elected to keep patient admitted overnight.    Neurological Status:   At baseline    Mental Status, Level of Consciousness: Alert and  oriented to person, place, and time    Pulmonary Status:   Adequate oxygenation and airway patent    Complications related to anesthesia: None    Post-anesthesia assessment completed. No concerns    Signed By: Taj Briceño MD     July 18, 2024

## 2024-07-18 NOTE — PERIOP NOTE
No  recent  covid  test   no s/s/  mepilex  scrum   border preventatively   applied   skin intact          pt  needs  to void  before going  into   surgery lat  void  ws  0500.    She   will    try   she  reports.   Tylenol   po   given per orders.

## 2024-07-18 NOTE — FLOWSHEET NOTE
07/18/24 1315   Handoff   Communication Given Periop Handoff/Relief   Handoff phase Phase I relief   Handoff Given To Naveen Shelton RN   Handoff Received From Neris Ennis RN   Handoff Communication Face to Face;At bedside   Time Handoff Given 9738

## 2024-07-18 NOTE — ANESTHESIA PRE PROCEDURE
Department of Anesthesiology  Preprocedure Note       Name:  Ilana Dill   Age:  78 y.o.  :  1945                                          MRN:  [id number]         Date:  2024      Surgeon: Surgeon(s):  Nicolas Costa MD    Procedure: Procedure(s):  ROBOTIC ASSISTED LAPAROSCOPIC INCISIONAL HERNIA REPAIR WITH MESH    Medications prior to admission:   Prior to Admission medications    Medication Sig Start Date End Date Taking? Authorizing Provider   atorvastatin (LIPITOR) 20 MG tablet Take 1 tablet by mouth daily 24   Florecita Fernandes MD   felodipine (PLENDIL) 5 MG extended release tablet Take 1 tablet by mouth daily 24   Florecita Fernandes MD   triamterene-hydroCHLOROthiazide (DYAZIDE) 37.5-25 MG per capsule Take 1 capsule by mouth daily 24   Florecita Fernandes MD   fexofenadine (ALLEGRA ALLERGY) 180 MG tablet Take 1 tablet by mouth daily    Kenya Durbin MD   vitamin D3 (CHOLECALCIFEROL) 10 MCG (400 UNIT) TABS tablet Take 1 tablet by mouth daily    Kenya Durbin MD   potassium chloride (KLOR-CON M) 20 MEQ extended release tablet Take 1 tablet by mouth daily 24   Florecita Fernandes MD   mirabegron (MYRBETRIQ) 25 MG TB24 Take 1 tablet by mouth daily  Patient taking differently: Take 2 tablets by mouth daily 23   Florecita Fernandes MD   aspirin 81 MG EC tablet Take 1 tablet by mouth daily    ProviderKenya MD       Current medications:    Current Facility-Administered Medications   Medication Dose Route Frequency Provider Last Rate Last Admin    ceFAZolin (ANCEF) 2,000 mg in sterile water 20 mL IV syringe  2,000 mg IntraVENous Once Nicolas Costa MD        lactated ringers IV soln infusion   IntraVENous Continuous Tabitha Rose MD        acetaminophen (TYLENOL) tablet 1,000 mg  1,000 mg Oral Once Mando Berry MD        fentaNYL (SUBLIMAZE) injection 100 mcg  100 mcg IntraVENous Once PRN Mando Berry MD        ondansetron

## 2024-07-18 NOTE — FLOWSHEET NOTE
07/18/24 0948   Family Communication    Relationship to Patient Spouse    Phone Number [place], [phone]   Family/Significant Other Update Other (comment)  (Family notification text)   Delivery Origin Nurse  (CARLOS Vernon RN)   Family Communication   Family Update Message Surgeon working;Patient stable

## 2024-07-18 NOTE — DISCHARGE INSTRUCTIONS
Discharge Instructions:  Hernia Repair    Dr. Costa    Call for appointment for follow up in 2 weeks [phone]    Activity:    Walk regularly.  No lifting more than 10 pounds for 6 weeks.  Light aerobic activity is okay when you feel up to it.    You may resume driving in five days unless still requiring narcotics for pain.      Work:    You may return to work in 2 or 3 weeks to light activity. No lifting more than 5 pounds for four weeks and no more than 10 pounds for an additional 2 weeks.    Diet:    You may resume normal diet after 24 hours.  Anesthesia and narcotics may cause nausea and vomiting.  If persistent please call the office.    Wound Care:    You have a special dressing called Dermabond.  It is okay to shower and let the water run over the incisions but do not scrub the area or soak in a tub.  If you have a small amount of drainage you may place a dry bandage over the wound and change it daily.  If you experience a lot of drainage, develop redness around the wound, or a fever over 101 F occurs please call the office.    Use ice over right lower abdomen and mid abdomen for 20 minutes every 1 - 2 to reduce pain  Wear abdominal binder for 6 weeks.  Wear 24/7 for 3 weeks and then when out of bed for another 3 weeks.    Medications:    Resume home medications as indicated on the Medical Reconciliation form.     Aspirin and Coumadin can be restarted immediately if you were taking them preoperatively.  If taking Plavix do not restart it until post operative day 2.      Pain medications:  Non steroidal antiinflammatories seem to work best for post surgical pain.  Try these first as prescribed.  A narcotic prescription will also be given for breakthrough pain.    Over the counter stool softeners and laxatives may be used if needed.    Do not hesitate to call with questions or concerns.

## 2024-07-18 NOTE — FLOWSHEET NOTE
07/18/24 1051   Handoff   Communication Given Transfer Handoff   Handoff phase Phase I receiving   Handoff Given To Neris SOUZA   Handoff Received From CARLOS Vernon and TIMOTEO Guillory CRNA   Handoff Communication Face to Face;At bedside     1235 Spoke to family regarding patient.   requesting patient stay overnight.  Dr. Costa called and made aware of status.  Admission orders for overnight.      1500  Urinary Retention Nurse Driven Protocol Documentation    Date and time of last void/straight catheterization (Date/Time or unknown)         Bladder scan amount represents 420    Order placed for straight catheterization per protocol      Straight catheterization performed per Nurse Driven Protocol        07/18/24 1525   Handoff   Communication Given Transfer Handoff   Handoff phase Phase I transferring   Handoff Given To Dasha SOUZA   Handoff Received From Neris SOUZA   Handoff Communication Telephone       Elena Ennis RN

## 2024-07-18 NOTE — H&P
range of motion.      Cervical back: Normal range of motion and neck supple.   Lymphadenopathy:      Cervical: No cervical adenopathy.      Upper Body:      Right upper body: No supraclavicular adenopathy.      Left upper body: No supraclavicular adenopathy.   Skin:     Coloration: Skin is not jaundiced.      Findings: No erythema or rash.   Neurological:      Mental Status: She is alert and oriented to person, place, and time.      Cranial Nerves: No cranial nerve deficit.      Coordination: Coordination normal.      Gait: Gait normal.   Psychiatric:         Behavior: Behavior normal.         Thought Content: Thought content normal.         Judgment: Judgment normal.            I reviewed her CT images with her today     Assessment / Plan:                  RLQ incisional hernia.  I explained about the anatomy and pathophysiology of hernias and the risk of incarceration and strangulation of the bowel.  I explained about hernia repairs (open with and without mesh, and robotic assisted and laparoscopic with mesh).  I explained the risks and benefits of repair including bleeding, infection, chronic pain, bowel or bladder injury, hernia recurrence, seroma, mesh infection requiring removal.  I explained it would be a six to eight week recuperation with no driving for 5 - 7 days, no lifting for six weeks.    Essential hypertension   stable on rx  Hyperlipidemia. On rx     She wishes to proceed with a robotic assisted laparoscopic incisional hernia repair with mesh under general anesthesia as an outpatient with a possible overnight stay        Nicolas Costa MD FACS

## 2024-07-18 NOTE — PROGRESS NOTES
End of Shift Note    Bedside shift change report given to LIBBY Trinh (oncoming nurse) by Dasha Hammond RN (offgoing nurse).  Report included the following information SBAR and MAR    Shift worked:  4989-1231     Shift summary and any significant changes:    Cooperative patient, a/ox4. DTV 2100, LBM 7/17. Fair appetite. Dressing, C/D/I. No c/o N/V. PO/IV PRN meds. Not OOB during shift. Bed locked and in lowest position. Bed alarm on. Call bell within reach. No questions or concerns presented at this time. Encouraged patient to call for assistance PRN.    Concerns for physician to address: Protonix?     Zone phone for oncoming shift:   [phone]           Dasha Hammond RN

## 2024-07-18 NOTE — ANESTHESIA PRE PROCEDURE
Department of Anesthesiology  Preprocedure Note       Name:  Ilana Dill   Age:  78 y.o.  :  1945                                          MRN:  [id number]         Date:  2024      Surgeon: Surgeon(s):  Nicolas Costa MD    Procedure: Procedure(s):  ROBOTIC ASSISTED LAPAROSCOPIC INCISIONAL HERNIA REPAIR WITH MESH    Medications prior to admission:   Prior to Admission medications    Medication Sig Start Date End Date Taking? Authorizing Provider   atorvastatin (LIPITOR) 20 MG tablet Take 1 tablet by mouth daily 24   Florecita Fernandes MD   felodipine (PLENDIL) 5 MG extended release tablet Take 1 tablet by mouth daily 24   Florecita Fernandes MD   triamterene-hydroCHLOROthiazide (DYAZIDE) 37.5-25 MG per capsule Take 1 capsule by mouth daily 24   Florecita Fernandes MD   fexofenadine (ALLEGRA ALLERGY) 180 MG tablet Take 1 tablet by mouth daily    Kenya Durbin MD   vitamin D3 (CHOLECALCIFEROL) 10 MCG (400 UNIT) TABS tablet Take 1 tablet by mouth daily    Kenya Durbin MD   potassium chloride (KLOR-CON M) 20 MEQ extended release tablet Take 1 tablet by mouth daily 24   Florecita Fernandes MD   mirabegron (MYRBETRIQ) 25 MG TB24 Take 1 tablet by mouth daily  Patient taking differently: Take 2 tablets by mouth daily 23   Florecita Fernandes MD   aspirin 81 MG EC tablet Take 1 tablet by mouth daily    ProviderKenya MD       Current medications:    No current facility-administered medications for this encounter.     Current Outpatient Medications   Medication Sig Dispense Refill   • atorvastatin (LIPITOR) 20 MG tablet Take 1 tablet by mouth daily 90 tablet 1   • felodipine (PLENDIL) 5 MG extended release tablet Take 1 tablet by mouth daily 90 tablet 1   • triamterene-hydroCHLOROthiazide (DYAZIDE) 37.5-25 MG per capsule Take 1 capsule by mouth daily 90 capsule 1   • fexofenadine (ALLEGRA ALLERGY) 180 MG tablet Take 1 tablet by mouth daily     • vitamin D3

## 2024-07-19 VITALS
DIASTOLIC BLOOD PRESSURE: 68 MMHG | OXYGEN SATURATION: 92 % | HEIGHT: 65 IN | WEIGHT: 151.9 LBS | SYSTOLIC BLOOD PRESSURE: 126 MMHG | HEART RATE: 108 BPM | TEMPERATURE: 98.2 F | RESPIRATION RATE: 15 BRPM | BODY MASS INDEX: 25.31 KG/M2

## 2024-07-19 PROCEDURE — 6370000000 HC RX 637 (ALT 250 FOR IP): Performed by: SURGERY

## 2024-07-19 PROCEDURE — 2500000003 HC RX 250 WO HCPCS: Performed by: SURGERY

## 2024-07-19 PROCEDURE — 97161 PT EVAL LOW COMPLEX 20 MIN: CPT | Performed by: PHYSICAL THERAPIST

## 2024-07-19 PROCEDURE — 6360000002 HC RX W HCPCS: Performed by: NURSE PRACTITIONER

## 2024-07-19 PROCEDURE — 6360000002 HC RX W HCPCS: Performed by: SURGERY

## 2024-07-19 PROCEDURE — 97116 GAIT TRAINING THERAPY: CPT | Performed by: PHYSICAL THERAPIST

## 2024-07-19 PROCEDURE — 2580000003 HC RX 258: Performed by: SURGERY

## 2024-07-19 PROCEDURE — 6370000000 HC RX 637 (ALT 250 FOR IP): Performed by: NURSE PRACTITIONER

## 2024-07-19 RX ORDER — KETOROLAC TROMETHAMINE 30 MG/ML
15 INJECTION, SOLUTION INTRAMUSCULAR; INTRAVENOUS EVERY 6 HOURS
Status: DISCONTINUED | OUTPATIENT
Start: 2024-07-19 | End: 2024-07-19 | Stop reason: HOSPADM

## 2024-07-19 RX ORDER — ACETAMINOPHEN 325 MG/1
650 TABLET ORAL EVERY 6 HOURS SCHEDULED
Status: DISCONTINUED | OUTPATIENT
Start: 2024-07-19 | End: 2024-07-19 | Stop reason: HOSPADM

## 2024-07-19 RX ADMIN — OXYCODONE 5 MG: 5 TABLET ORAL at 08:59

## 2024-07-19 RX ADMIN — TROSPIUM CHLORIDE 20 MG: 20 TABLET, FILM COATED ORAL at 08:59

## 2024-07-19 RX ADMIN — ENOXAPARIN SODIUM 40 MG: 100 INJECTION SUBCUTANEOUS at 08:59

## 2024-07-19 RX ADMIN — KETOROLAC TROMETHAMINE 15 MG: 30 INJECTION, SOLUTION INTRAMUSCULAR at 10:35

## 2024-07-19 RX ADMIN — ATORVASTATIN CALCIUM 20 MG: 20 TABLET, FILM COATED ORAL at 08:59

## 2024-07-19 RX ADMIN — HYDROMORPHONE HYDROCHLORIDE 0.5 MG: 1 INJECTION, SOLUTION INTRAMUSCULAR; INTRAVENOUS; SUBCUTANEOUS at 02:05

## 2024-07-19 RX ADMIN — TRIAMTERENE AND HYDROCHLOROTHIAZIDE 1 TABLET: 37.5; 25 TABLET ORAL at 08:59

## 2024-07-19 RX ADMIN — KETOROLAC TROMETHAMINE 15 MG: 30 INJECTION, SOLUTION INTRAMUSCULAR at 16:24

## 2024-07-19 RX ADMIN — POTASSIUM CHLORIDE 20 MEQ: 1500 TABLET, EXTENDED RELEASE ORAL at 08:59

## 2024-07-19 RX ADMIN — ACETAMINOPHEN 650 MG: 325 TABLET ORAL at 13:27

## 2024-07-19 RX ADMIN — SODIUM CHLORIDE, PRESERVATIVE FREE 10 ML: 5 INJECTION INTRAVENOUS at 09:02

## 2024-07-19 ASSESSMENT — PAIN SCALES - GENERAL
PAINLEVEL_OUTOF10: 6
PAINLEVEL_OUTOF10: 6
PAINLEVEL_OUTOF10: 4

## 2024-07-19 ASSESSMENT — PAIN DESCRIPTION - ORIENTATION
ORIENTATION: RIGHT;LOWER
ORIENTATION: RIGHT;LOWER
ORIENTATION: LEFT;LOWER;MID

## 2024-07-19 ASSESSMENT — PAIN DESCRIPTION - DESCRIPTORS
DESCRIPTORS: ACHING
DESCRIPTORS: ACHING;DISCOMFORT;PRESSURE;SORE;THROBBING
DESCRIPTORS: ACHING
DESCRIPTORS: ACHING

## 2024-07-19 ASSESSMENT — PAIN DESCRIPTION - LOCATION
LOCATION: ABDOMEN

## 2024-07-19 ASSESSMENT — PAIN - FUNCTIONAL ASSESSMENT
PAIN_FUNCTIONAL_ASSESSMENT: ACTIVITIES ARE NOT PREVENTED
PAIN_FUNCTIONAL_ASSESSMENT: PREVENTS OR INTERFERES SOME ACTIVE ACTIVITIES AND ADLS

## 2024-07-19 ASSESSMENT — PAIN SCALES - WONG BAKER: WONGBAKER_NUMERICALRESPONSE: HURTS A LITTLE BIT

## 2024-07-19 NOTE — PROGRESS NOTES
Patient ambulated to bathroom to attempt to void, unsuccessful. Bladder scan revealed 411 ml urine in bladder. Straight cath per protocol performed for immediate result of 425 ml clear dark yellow urine, patient tolerated well.

## 2024-07-19 NOTE — PROGRESS NOTES
PHYSICAL THERAPY EVALUATION/DISCHARGE    Patient: Ilana Dill (78 y.o. female)  Date: 2024  Primary Diagnosis: Incisional hernia [K43.2]  Incisional hernia, without obstruction or gangrene [K43.2]  Procedure(s) (LRB):  ROBOTIC ASSISTED LAPAROSCOPIC INCISIONAL HERNIA REPAIR WITH MESH (TIMES 2) (N/A) 1 Day Post-Op                       ASSESSMENT AND RECOMMENDATIONS:  Patient seen for PT evaluation following admission for lap hernia repair and patient expressing concern regarding navigating stairs. Patient educated on log rolling technique and was able to complete bed mobility with SBA. She demonstrates independence with transfers and ambulation without use of assistive device. Patient was able to complete stair training, ascending/descending 12 steps using R railing with SBA. Following tx session patient with no further concerns regarding mobility. She has no further therapy needs and is cleared for discharge from PT standpoint.  Will sign off       PLAN :  Recommendation for discharge: (in order for the patient to meet his/her long term goals): No skilled physical therapy    Other factors to consider for discharge: no additional factors    IF patient discharges home will need the following DME: none       SUBJECTIVE:   Patient stated “I feel better about that.”- the stairs    OBJECTIVE DATA SUMMARY:     Past Medical History:   Diagnosis Date    Dyspepsia and other specified disorders of function of stomach     GERD (gastroesophageal reflux disease)     H/O allergy     Hypercholesterolemia     Hypertension     Indigestion      Past Surgical History:   Procedure Laterality Date    CATARACT REMOVAL Bilateral 2006     SECTION  1974    CHOLECYSTECTOMY  14    lap bhaskar with cholangiogram    COLONOSCOPY N/A 2019    COLONOSCOPY performed by Nicolas Hargrove MD at \[facility]\"" AMBULATORY OR    CYSTOSCOPY  2023    CYSTOSCOPY performed by Richar Winkler MD at \[facility]\"" MAIN OR    HYSTERECTOMY (CERVIX STATUS  Training:                       Gait  Gait Training: Yes  Overall Level of Assistance: Independent  Distance (ft): 250 Feet  Assistive Device:  (none)  Base of Support: Widened  Speed/Abbey:  (WFL)  Step Length:  (WFL)  Gait Abnormalities:  (no overt abnormalities)  Rail Use: Right  Stairs - Level of Assistance: Stand-by assistance  Number of Stairs Trained: 12          Activity Tolerance:   Good    After treatment:   Patient left in no apparent distress sitting edge of bed, Call bell within reach, and Caregiver / family present      COMMUNICATION/EDUCATION:   The patient's plan of care was discussed with: registered nurse         Thank you for this referral.  Rachele Mojica, PT  Minutes: 13

## 2024-07-19 NOTE — PROGRESS NOTES
Surgery NP Progress Note    Ilana Dill  [id number]  female  78 y.o.  1945    s/p ROBOTIC ASSISTED LAPAROSCOPIC INCISIONAL HERNIA REPAIR WITH MESH (TIMES 2) on 2024      Assessment:   Principal Problem:    Incisional hernia, without obstruction or gangrene  Resolved Problems:    * No resolved hospital problems. *      Expected post-op progress.     Plan/Recommendations/Medical Decision Making:     - Mobilize with nursing and OOB to chair for meals  - Continue diet  - Pain management- Continue current pain control methods. Add low dose toradol and scheduled tylenol.   - VTE Prophylaxis: Lovenox   - PT consult as patient is concerned about 4 stairs at home.   - Anticipate d/c later today if pain well controlled and able to tolerate stairs.     Subjective:     Patient states oxycodone is effective but she is still sore. She is concerned about going up the stairs at home. There are 4-5 of them.     Objective:     Blood pressure 125/67, pulse 94, temperature 98.4 °F (36.9 °C), temperature source Oral, resp. rate 18, height 1.651 m (5' 5\"), weight 68.9 kg (151 lb 14.4 oz), SpO2 95 %.    Temp (24hrs), Av.3 °F (36.8 °C), Min:97.7 °F (36.5 °C), Max:99 °F (37.2 °C)      Pt resting in bed NAD   Incisions CDI.    Abd soft and tender. Binder in place.   SCDs for mechanical DVT proph while in bed     Body mass index is 25.28 kg/m².     Reference: BMI greater than 30 is classified as obesity and greater than 40 is classified as morbid obesity.       Lizeth Ross, APRN - NP   MSN, APRN, FNP-C, CWOCN-AP, RNAS-C    24

## 2024-07-22 NOTE — OP NOTE
[facility]              [address] [place]  [address]                            OPERATIVE REPORT      PATIENT NAME: BRIAN GRAY              : 1945  MED REC NO: [id number]                       ROOM: 3124  ACCOUNT NO: [id number]                       ADMIT DATE: 2024  PROVIDER: Nicolas Ponce MD    DATE OF SERVICE:  2024    PREOPERATIVE DIAGNOSES:  Incisional hernia.    POSTOPERATIVE DIAGNOSES:  Incisional hernia x2.    PROCEDURES PERFORMED:  Robotic-assisted laparoscopic incisional hernia repair x2.  Total length is 12 cm.    SURGEON:  Nicolas Ponce MD    ASSISTANT:  Gerald Liddle.    ANESTHESIA:  General.    ESTIMATED BLOOD LOSS:  Minimal.    SPECIMENS REMOVED:  Incisional hernia sac.    INTRAOPERATIVE FINDINGS:  An incisional hernia 7 cm in the right lower quadrant and a 5 cm incisional hernia in the left upper abdomen.     COMPLICATIONS:  None.    IMPLANTS:  B[employer]ol polypropylene Ventralight mesh with separate technology 4 x 6 inch, lot #[id number] for right lower abdominal incisional hernia and similarly a piece of [employer] polypropylene Ventralight mesh with separate technology, lot #[id number] for a midline incisional hernia.    INDICATIONS:  The patient is a 78-year-old female, who had previous colectomy and colostomy and revision and then takedown.  She has a very symptomatic right lower quadrant incisional hernia and upper midline incision as well.  Options were discussed.  She elected to undergo repair.  She understands the risks and benefits and these are noted in my office notes.    DESCRIPTION OF PROCEDURE:  The patient was taken to the operating room and placed on the operating table in supine position, underwent general endotracheal anesthesia, and the bed was mildly flexed and the abdomen was prepped and draped in the usual sterile manner.  After appropriate time-out, antibiotics were given, 0.5% Marcaine with

## 2024-07-26 ENCOUNTER — TELEPHONE (OUTPATIENT)
Age: 79
End: 2024-07-26

## 2024-07-26 NOTE — TELEPHONE ENCOUNTER
Spoke with patient and she said she has taken Miralax, Colace and Ex lax and her stool comes down to her rectum but will not come out. I spoke with Dr Costa and he said that she could try some enemas or Mg Citrate. Patient made aware and I have asked to her to let us if this does not work for her. She understood

## 2024-08-02 ENCOUNTER — OFFICE VISIT (OUTPATIENT)
Age: 79
End: 2024-08-02
Payer: MEDICARE

## 2024-08-02 VITALS
BODY MASS INDEX: 24.66 KG/M2 | WEIGHT: 148 LBS | SYSTOLIC BLOOD PRESSURE: 132 MMHG | RESPIRATION RATE: 18 BRPM | OXYGEN SATURATION: 95 % | DIASTOLIC BLOOD PRESSURE: 77 MMHG | HEIGHT: 65 IN | HEART RATE: 91 BPM | TEMPERATURE: 98.2 F

## 2024-08-02 DIAGNOSIS — K43.2 INCISIONAL HERNIA, WITHOUT OBSTRUCTION OR GANGRENE: Primary | ICD-10-CM

## 2024-08-02 PROCEDURE — 3075F SYST BP GE 130 - 139MM HG: CPT | Performed by: SURGERY

## 2024-08-02 PROCEDURE — 3078F DIAST BP <80 MM HG: CPT | Performed by: SURGERY

## 2024-08-02 PROCEDURE — 1123F ACP DISCUSS/DSCN MKR DOCD: CPT | Performed by: SURGERY

## 2024-08-02 PROCEDURE — 99213 OFFICE O/P EST LOW 20 MIN: CPT | Performed by: SURGERY

## 2024-08-02 ASSESSMENT — PATIENT HEALTH QUESTIONNAIRE - PHQ9
1. LITTLE INTEREST OR PLEASURE IN DOING THINGS: NOT AT ALL
SUM OF ALL RESPONSES TO PHQ QUESTIONS 1-9: 0
2. FEELING DOWN, DEPRESSED OR HOPELESS: NOT AT ALL
SUM OF ALL RESPONSES TO PHQ QUESTIONS 1-9: 0
SUM OF ALL RESPONSES TO PHQ9 QUESTIONS 1 & 2: 0

## 2024-08-02 NOTE — PROGRESS NOTES
Identified pt with two pt identifiers (name and ). Reviewed chart in preparation for visit and have obtained necessary documentation.    Ilana Dill is a 78 y.o. female Post-Op Check (Post Op 2 wk robotic assisted Laparoscopic incisional Hernia with mesh)  .    Vitals:    24 1000   BP: 132/77   Site: Left Upper Arm   Position: Sitting   Cuff Size: Medium Adult   Pulse: 91   Resp: 18   Temp: 98.2 °F (36.8 °C)   TempSrc: Oral   SpO2: 95%   Weight: 67.1 kg (148 lb)   Height: 1.651 m (5' 5\")          1. Have you been to the ER, urgent care clinic since your last visit?  Hospitalized since your last visit?  no     2. Have you seen or consulted any other health care providers outside of the [facility] since your last visit?  Include any pap smears or colon screening.  no

## 2024-08-02 NOTE — PROGRESS NOTES
Surgery  Follow up    Procedure: Robotic-assisted laparoscopic incisional hernia repair x2. Total length is 12 cm   OR date:  7/18/2024  Path:    Hernia sac, excision:        Benign fibromembranous and fibroadipose tissue, consistent with   hernia sac.     S I feel sharp pains on my right side    /77 (Site: Left Upper Arm, Position: Sitting, Cuff Size: Medium Adult)   Pulse 91   Temp 98.2 °F (36.8 °C) (Oral)   Resp 18   Ht 1.651 m (5' 5\")   Wt 67.1 kg (148 lb)   SpO2 95%   BMI 24.63 kg/m²     O Incisions healing well without infection   No signs of herniae    A/P Doing as expected    Continue with binder   No lifting for another 4 weeks   RTW na   RTC 6 weeks     Nicolas Costa MD FACS

## 2024-08-30 ENCOUNTER — OFFICE VISIT (OUTPATIENT)
Age: 79
End: 2024-08-30
Payer: MEDICARE

## 2024-08-30 VITALS
SYSTOLIC BLOOD PRESSURE: 117 MMHG | TEMPERATURE: 98.2 F | HEART RATE: 85 BPM | DIASTOLIC BLOOD PRESSURE: 70 MMHG | WEIGHT: 144.4 LBS | BODY MASS INDEX: 24.06 KG/M2 | OXYGEN SATURATION: 96 % | RESPIRATION RATE: 12 BRPM | HEIGHT: 65 IN

## 2024-08-30 DIAGNOSIS — K43.2 INCISIONAL HERNIA, WITHOUT OBSTRUCTION OR GANGRENE: Primary | ICD-10-CM

## 2024-08-30 PROCEDURE — 3078F DIAST BP <80 MM HG: CPT | Performed by: SURGERY

## 2024-08-30 PROCEDURE — 3074F SYST BP LT 130 MM HG: CPT | Performed by: SURGERY

## 2024-08-30 PROCEDURE — 1123F ACP DISCUSS/DSCN MKR DOCD: CPT | Performed by: SURGERY

## 2024-08-30 PROCEDURE — 99213 OFFICE O/P EST LOW 20 MIN: CPT | Performed by: SURGERY

## 2024-08-30 RX ORDER — DORZOLAMIDE HYDROCHLORIDE AND TIMOLOL MALEATE 20; 5 MG/ML; MG/ML
1 SOLUTION/ DROPS OPHTHALMIC 2 TIMES DAILY
COMMUNITY
Start: 2024-06-05

## 2024-08-30 RX ORDER — MIRABEGRON 50 MG/1
50 TABLET, FILM COATED, EXTENDED RELEASE ORAL DAILY
COMMUNITY
Start: 2024-06-14

## 2024-08-30 RX ORDER — ESTRADIOL 0.1 MG/G
1 CREAM VAGINAL DAILY
COMMUNITY
Start: 2024-06-12

## 2024-08-30 ASSESSMENT — PATIENT HEALTH QUESTIONNAIRE - PHQ9
SUM OF ALL RESPONSES TO PHQ QUESTIONS 1-9: 0
SUM OF ALL RESPONSES TO PHQ QUESTIONS 1-9: 0
1. LITTLE INTEREST OR PLEASURE IN DOING THINGS: NOT AT ALL
SUM OF ALL RESPONSES TO PHQ QUESTIONS 1-9: 0
SUM OF ALL RESPONSES TO PHQ9 QUESTIONS 1 & 2: 0
SUM OF ALL RESPONSES TO PHQ QUESTIONS 1-9: 0
2. FEELING DOWN, DEPRESSED OR HOPELESS: NOT AT ALL

## 2024-08-30 NOTE — PROGRESS NOTES
Identified pt with two pt identifiers (name and ). Reviewed chart in preparation for visit and have obtained necessary documentation.    Ilana Dill is a 78 y.o. female Post-Op Check (S/p Robotic-assisted laparoscopic incisional hernia repair x2. Total length is 12 cm on 24)  .    Vitals:    24 0751   BP: 117/70   Site: Left Upper Arm   Position: Sitting   Pulse: 85   Resp: 12   Temp: 98.2 °F (36.8 °C)   TempSrc: Oral   SpO2: 96%   Weight: 65.5 kg (144 lb 6.4 oz)   Height: 1.651 m (5' 5\")          1. Have you been to the ER, urgent care clinic since your last visit?  Hospitalized since your last visit?  no     2. Have you seen or consulted any other health care providers outside of the [facility] since your last visit?  Include any pap smears or colon screening.  no

## 2024-08-30 NOTE — PROGRESS NOTES
Surgery  Follow up    Procedure: Robotic-assisted laparoscopic incisional hernia repair x2. Total length is 12 cm   OR date:  7/18/2024  Path:    Hernia sac, excision:        Benign fibromembranous and fibroadipose tissue, consistent with   hernia sac.     S I still have some soreness on my right side    /70 (Site: Left Upper Arm, Position: Sitting)   Pulse 85   Temp 98.2 °F (36.8 °C) (Oral)   Resp 12   Ht 1.651 m (5' 5\")   Wt 65.5 kg (144 lb 6.4 oz)   SpO2 96%   BMI 24.03 kg/m²     O Incisions healing well without infection   No signs of herniae    A/P Doing as expected   Continue with binder   No restrictions   RTW na   RTC prn    Nicolas Costa MD FACS

## 2024-10-16 NOTE — PROGRESS NOTES
HISTORY OF PRESENT ILLNESS  Ilana Dill is a 78 y.o. female.  HPI  Last here 4/23/24. She presents for routine care.  Blood type is A+           Has history of hypertension  BP today is 114/70  BP at home not checked   Continues on dyazide 37.5-25mg and felodipine 5mg daily      Wt today is 148 lbs, stable since lov   Her weight is within normal ranges     Reviewed labs   Ordered labs    She notes a lump on R lower abdomen she noticed this about 2 months ago of note she had a hemicolectomy in September 2023, ultimately ended up being a hernia, referred to surgery   Reviewed CT abd 5/6/24  IMPRESSION:  Fat-containing right lower quadrant anterior abdominal wall hernia    Reviewed Dr Costa (gen surg) new patient visit 5/14/24   RLQ incisional hernia.  I explained about the anatomy and pathophysiology of hernias and the risk of incarceration and strangulation of the bowel.  I explained about hernia repairs (open with and without mesh, and robotic assisted and laparoscopic with mesh).  I explained the risks and benefits of repair including bleeding, infection, chronic pain, bowel or bladder injury, hernia recurrence, seroma, mesh infection requiring removal.  I explained it would be a six to eight week recuperation with no driving for 5 - 7 days, no lifting for six weeks.    Essential hypertension   stable on rx  Hyperlipidemia. On rx     She wishes to proceed with a robotic assisted laparoscopic incisional hernia repair with mesh under general anesthesia as an outpatient with a possible overnight stay     She was in the hospital overnight after hernia repair with Dr Costa 7/18/24-7/19/24  Reviewed last progress note CHAKA Ross 7/19/24  Expected post-op progress.      Plan/Recommendations/Medical Decision Making:     - Mobilize with nursing and OOB to chair for meals  - Continue diet  - Pain management- Continue current pain control methods. Add low dose toradol and scheduled tylenol.   - VTE

## 2024-10-21 SDOH — ECONOMIC STABILITY: TRANSPORTATION INSECURITY
IN THE PAST 12 MONTHS, HAS LACK OF TRANSPORTATION KEPT YOU FROM MEETINGS, WORK, OR FROM GETTING THINGS NEEDED FOR DAILY LIVING?: NO

## 2024-10-21 SDOH — ECONOMIC STABILITY: FOOD INSECURITY: WITHIN THE PAST 12 MONTHS, THE FOOD YOU BOUGHT JUST DIDN'T LAST AND YOU DIDN'T HAVE MONEY TO GET MORE.: NEVER TRUE

## 2024-10-21 SDOH — ECONOMIC STABILITY: INCOME INSECURITY: HOW HARD IS IT FOR YOU TO PAY FOR THE VERY BASICS LIKE FOOD, HOUSING, MEDICAL CARE, AND HEATING?: NOT HARD AT ALL

## 2024-10-21 SDOH — ECONOMIC STABILITY: FOOD INSECURITY: WITHIN THE PAST 12 MONTHS, YOU WORRIED THAT YOUR FOOD WOULD RUN OUT BEFORE YOU GOT MONEY TO BUY MORE.: NEVER TRUE

## 2024-10-21 NOTE — DISCHARGE INSTRUCTIONS
Your work-up for your chest pain is negative for any emergent problems. It is unlikely be your heart however he would benefit from a stress test on in outpatient setting. Please contact your primary care provider for follow-up.
Stable

## 2024-10-23 ENCOUNTER — OFFICE VISIT (OUTPATIENT)
Age: 79
End: 2024-10-23
Payer: MEDICARE

## 2024-10-23 VITALS
DIASTOLIC BLOOD PRESSURE: 70 MMHG | SYSTOLIC BLOOD PRESSURE: 114 MMHG | WEIGHT: 148 LBS | HEART RATE: 85 BPM | RESPIRATION RATE: 16 BRPM | HEIGHT: 65 IN | OXYGEN SATURATION: 99 % | BODY MASS INDEX: 24.66 KG/M2 | TEMPERATURE: 97.9 F

## 2024-10-23 DIAGNOSIS — I10 PRIMARY HYPERTENSION: Primary | ICD-10-CM

## 2024-10-23 DIAGNOSIS — R73.01 IFG (IMPAIRED FASTING GLUCOSE): ICD-10-CM

## 2024-10-23 DIAGNOSIS — E78.2 MIXED HYPERLIPIDEMIA: ICD-10-CM

## 2024-10-23 DIAGNOSIS — N18.31 STAGE 3A CHRONIC KIDNEY DISEASE (HCC): ICD-10-CM

## 2024-10-23 DIAGNOSIS — I25.10 CORONARY ARTERY DISEASE INVOLVING NATIVE CORONARY ARTERY OF NATIVE HEART WITHOUT ANGINA PECTORIS: ICD-10-CM

## 2024-10-23 DIAGNOSIS — N39.3 STRESS INCONTINENCE OF URINE: ICD-10-CM

## 2024-10-23 PROBLEM — Z01.818 ENCOUNTER FOR PREADMISSION TESTING: Status: RESOLVED | Noted: 2023-09-21 | Resolved: 2024-10-23

## 2024-10-23 PROBLEM — K43.2 INCISIONAL HERNIA: Status: RESOLVED | Noted: 2024-05-14 | Resolved: 2024-10-23

## 2024-10-23 LAB
ANION GAP SERPL CALC-SCNC: 2 MMOL/L (ref 2–12)
BUN SERPL-MCNC: 21 MG/DL (ref 6–20)
BUN/CREAT SERPL: 19 (ref 12–20)
CALCIUM SERPL-MCNC: 9.7 MG/DL (ref 8.5–10.1)
CHLORIDE SERPL-SCNC: 106 MMOL/L (ref 97–108)
CHOLEST SERPL-MCNC: 147 MG/DL
CO2 SERPL-SCNC: 31 MMOL/L (ref 21–32)
CREAT SERPL-MCNC: 1.1 MG/DL (ref 0.55–1.02)
EST. AVERAGE GLUCOSE BLD GHB EST-MCNC: 114 MG/DL
GLUCOSE SERPL-MCNC: 105 MG/DL (ref 65–100)
HBA1C MFR BLD: 5.6 % (ref 4–5.6)
HDLC SERPL-MCNC: 49 MG/DL
HDLC SERPL: 3 (ref 0–5)
LDLC SERPL CALC-MCNC: 76 MG/DL (ref 0–100)
POTASSIUM SERPL-SCNC: 4.2 MMOL/L (ref 3.5–5.1)
SODIUM SERPL-SCNC: 139 MMOL/L (ref 136–145)
TRIGL SERPL-MCNC: 110 MG/DL
TSH SERPL DL<=0.05 MIU/L-ACNC: 1.62 UIU/ML (ref 0.36–3.74)
VLDLC SERPL CALC-MCNC: 22 MG/DL

## 2024-10-23 PROCEDURE — 99214 OFFICE O/P EST MOD 30 MIN: CPT | Performed by: INTERNAL MEDICINE

## 2024-10-23 ASSESSMENT — PATIENT HEALTH QUESTIONNAIRE - PHQ9
1. LITTLE INTEREST OR PLEASURE IN DOING THINGS: NOT AT ALL
2. FEELING DOWN, DEPRESSED OR HOPELESS: NOT AT ALL
SUM OF ALL RESPONSES TO PHQ QUESTIONS 1-9: 0
SUM OF ALL RESPONSES TO PHQ9 QUESTIONS 1 & 2: 0
SUM OF ALL RESPONSES TO PHQ QUESTIONS 1-9: 0

## 2024-11-19 RX ORDER — ATORVASTATIN CALCIUM 20 MG/1
20 TABLET, FILM COATED ORAL DAILY
Qty: 90 TABLET | Refills: 1 | Status: SHIPPED | OUTPATIENT
Start: 2024-11-19

## 2024-12-03 ENCOUNTER — TELEPHONE (OUTPATIENT)
Age: 79
End: 2024-12-03

## 2024-12-03 NOTE — TELEPHONE ENCOUNTER
Spoke to patient and is complaining of pain to right side. She stated pain has been going on for some time now. Patient is scheduled to see Dr. Costa on 6 December.

## 2024-12-06 ENCOUNTER — OFFICE VISIT (OUTPATIENT)
Age: 79
End: 2024-12-06
Payer: MEDICARE

## 2024-12-06 VITALS
RESPIRATION RATE: 18 BRPM | WEIGHT: 150.2 LBS | DIASTOLIC BLOOD PRESSURE: 77 MMHG | HEART RATE: 88 BPM | BODY MASS INDEX: 25.02 KG/M2 | HEIGHT: 65 IN | SYSTOLIC BLOOD PRESSURE: 125 MMHG | OXYGEN SATURATION: 96 % | TEMPERATURE: 98 F

## 2024-12-06 DIAGNOSIS — R10.31 RLQ ABDOMINAL PAIN: Primary | ICD-10-CM

## 2024-12-06 PROCEDURE — 3078F DIAST BP <80 MM HG: CPT | Performed by: SURGERY

## 2024-12-06 PROCEDURE — 99213 OFFICE O/P EST LOW 20 MIN: CPT | Performed by: SURGERY

## 2024-12-06 PROCEDURE — 3074F SYST BP LT 130 MM HG: CPT | Performed by: SURGERY

## 2024-12-06 PROCEDURE — 1125F AMNT PAIN NOTED PAIN PRSNT: CPT | Performed by: SURGERY

## 2024-12-06 PROCEDURE — 1159F MED LIST DOCD IN RCRD: CPT | Performed by: SURGERY

## 2024-12-06 PROCEDURE — 1160F RVW MEDS BY RX/DR IN RCRD: CPT | Performed by: SURGERY

## 2024-12-06 PROCEDURE — 1123F ACP DISCUSS/DSCN MKR DOCD: CPT | Performed by: SURGERY

## 2024-12-06 ASSESSMENT — PATIENT HEALTH QUESTIONNAIRE - PHQ9
2. FEELING DOWN, DEPRESSED OR HOPELESS: NOT AT ALL
SUM OF ALL RESPONSES TO PHQ QUESTIONS 1-9: 0
SUM OF ALL RESPONSES TO PHQ9 QUESTIONS 1 & 2: 0
SUM OF ALL RESPONSES TO PHQ QUESTIONS 1-9: 0
SUM OF ALL RESPONSES TO PHQ QUESTIONS 1-9: 0
1. LITTLE INTEREST OR PLEASURE IN DOING THINGS: NOT AT ALL
SUM OF ALL RESPONSES TO PHQ QUESTIONS 1-9: 0

## 2024-12-06 ASSESSMENT — ENCOUNTER SYMPTOMS
CONSTIPATION: 0
SORE THROAT: 0
STRIDOR: 0
EYE PAIN: 0
NAUSEA: 0
SHORTNESS OF BREATH: 0
ABDOMINAL PAIN: 1
WHEEZING: 0
DIARRHEA: 0
COUGH: 0
VOMITING: 0
BACK PAIN: 0
BLOOD IN STOOL: 0

## 2024-12-06 NOTE — PROGRESS NOTES
Subjective:      Patient ID: Ilana Dill is a 78 y.o. female who comes in for follow up by Florecita Fernandes MD for abdominal pain after hernia repair      Chief Complaint   Patient presents with    Follow-up     Concern after incisional hernia repair.       HPI      She has noted RLQ swelling for almost 6-8 months.  She had had a RA sigmoid colectomy by Dr Gtz 2023 for diverticulitis.   The swelling occurred after that near a surgical scar.   She reports pain at times but not severe and the swelling goes away when laying down.   She denies associated nausea, vomiting, diarrhea, constipation, melena, hematochezia, dysuria or hematuria.   Recent colonoscopy was ok.    She had a a CT noting a RLQ hernia. I did a RA lap incisional hernia repair with mesh x 2 locations 2024.  She recovered well without pain.  Then, she was lifting around Thanksgiving and developed RLQ  pain.  It improves with tylenol.  It is improving.    Past Medical History:   Diagnosis Date    Dyspepsia and other specified disorders of function of stomach     GERD (gastroesophageal reflux disease)     H/O allergy     Hypercholesterolemia     Hypertension     Indigestion      Past Surgical History:   Procedure Laterality Date    CATARACT REMOVAL Bilateral 2006     SECTION  1974    CHOLECYSTECTOMY  14    lap bhaskar with cholangiogram    COLONOSCOPY N/A 2019    COLONOSCOPY performed by Nicolas Hargrove MD at \[facility]\"" AMBULATORY OR    CYSTOSCOPY  2023    CYSTOSCOPY performed by Richar Winkler MD at \[facility]\"" MAIN OR    HYSTERECTOMY (CERVIX STATUS UNKNOWN)      OTHER SURGICAL HISTORY  14    ERCPwith biliary sphincterotomy CBD balloon sweeps      PROCTOSIGMOIDOSCOPY N/A 2023    . performed by Casey Gtz II, MD at \[facility]\"" MAIN OR    SIGMOID COLECTOMY N/A 2023    ROBOTIC ASSISTED SIGMOID HEMICOLECTOMY WITH FLEXIBLE SIGMOIDOSCOPY,CYSTOSCOPY WITH INSERTION BILATERAL URETERALCATHETERS  (E R A S) performed by Casey GONZALEZ

## 2024-12-30 ENCOUNTER — TRANSCRIBE ORDERS (OUTPATIENT)
Facility: HOSPITAL | Age: 79
End: 2024-12-30

## 2024-12-30 DIAGNOSIS — Z12.31 VISIT FOR SCREENING MAMMOGRAM: Primary | ICD-10-CM

## 2025-02-03 ENCOUNTER — HOSPITAL ENCOUNTER (OUTPATIENT)
Facility: HOSPITAL | Age: 80
Discharge: HOME | End: 2025-02-06
Attending: INTERNAL MEDICINE
Payer: MEDICARE

## 2025-02-03 DIAGNOSIS — Z12.31 VISIT FOR SCREENING MAMMOGRAM: ICD-10-CM

## 2025-02-03 PROCEDURE — 77063 BREAST TOMOSYNTHESIS BI: CPT

## 2025-03-04 ENCOUNTER — TRANSCRIBE ORDERS (OUTPATIENT)
Facility: HOSPITAL | Age: 80
End: 2025-03-04

## 2025-03-04 ENCOUNTER — HOSPITAL ENCOUNTER (OUTPATIENT)
Facility: HOSPITAL | Age: 80
Discharge: HOME | End: 2025-03-06
Payer: MEDICARE

## 2025-03-04 DIAGNOSIS — M79.652 PAIN IN LEFT THIGH: ICD-10-CM

## 2025-03-04 DIAGNOSIS — M79.652 PAIN IN LEFT THIGH: Primary | ICD-10-CM

## 2025-03-04 PROCEDURE — 93971 EXTREMITY STUDY: CPT

## 2025-03-28 RX ORDER — ATORVASTATIN CALCIUM 20 MG/1
20 TABLET, FILM COATED ORAL DAILY
Qty: 90 TABLET | Refills: 0 | Status: SHIPPED | OUTPATIENT
Start: 2025-03-28

## 2025-03-28 RX ORDER — TRIAMTERENE AND HYDROCHLOROTHIAZIDE 37.5; 25 MG/1; MG/1
1 CAPSULE ORAL DAILY
Qty: 90 CAPSULE | Refills: 0 | Status: SHIPPED | OUTPATIENT
Start: 2025-03-28

## 2025-03-28 RX ORDER — FELODIPINE 5 MG/1
5 TABLET, EXTENDED RELEASE ORAL DAILY
Qty: 90 TABLET | Refills: 0 | Status: SHIPPED | OUTPATIENT
Start: 2025-03-28

## 2025-04-13 RX ORDER — MIRABEGRON 25 MG/1
25 TABLET, FILM COATED, EXTENDED RELEASE ORAL DAILY
Qty: 30 TABLET | Refills: 1 | OUTPATIENT
Start: 2025-04-13

## 2025-04-20 SDOH — ECONOMIC STABILITY: FOOD INSECURITY: WITHIN THE PAST 12 MONTHS, YOU WORRIED THAT YOUR FOOD WOULD RUN OUT BEFORE YOU GOT MONEY TO BUY MORE.: NEVER TRUE

## 2025-04-20 SDOH — ECONOMIC STABILITY: FOOD INSECURITY: WITHIN THE PAST 12 MONTHS, THE FOOD YOU BOUGHT JUST DIDN'T LAST AND YOU DIDN'T HAVE MONEY TO GET MORE.: NEVER TRUE

## 2025-04-20 SDOH — ECONOMIC STABILITY: INCOME INSECURITY: IN THE LAST 12 MONTHS, WAS THERE A TIME WHEN YOU WERE NOT ABLE TO PAY THE MORTGAGE OR RENT ON TIME?: NO

## 2025-04-23 ENCOUNTER — OFFICE VISIT (OUTPATIENT)
Age: 80
End: 2025-04-23
Payer: MEDICARE

## 2025-04-23 VITALS
OXYGEN SATURATION: 98 % | TEMPERATURE: 97.9 F | DIASTOLIC BLOOD PRESSURE: 69 MMHG | HEART RATE: 85 BPM | SYSTOLIC BLOOD PRESSURE: 115 MMHG | RESPIRATION RATE: 15 BRPM | HEIGHT: 66 IN | WEIGHT: 146.4 LBS | BODY MASS INDEX: 23.53 KG/M2

## 2025-04-23 DIAGNOSIS — R73.01 IFG (IMPAIRED FASTING GLUCOSE): ICD-10-CM

## 2025-04-23 DIAGNOSIS — I10 PRIMARY HYPERTENSION: Primary | ICD-10-CM

## 2025-04-23 DIAGNOSIS — R21 RASH: ICD-10-CM

## 2025-04-23 DIAGNOSIS — Z00.00 MEDICARE ANNUAL WELLNESS VISIT, SUBSEQUENT: ICD-10-CM

## 2025-04-23 DIAGNOSIS — E87.6 HYPOKALEMIA: ICD-10-CM

## 2025-04-23 DIAGNOSIS — N18.31 STAGE 3A CHRONIC KIDNEY DISEASE (HCC): ICD-10-CM

## 2025-04-23 DIAGNOSIS — I25.10 CORONARY ARTERY DISEASE INVOLVING NATIVE CORONARY ARTERY OF NATIVE HEART WITHOUT ANGINA PECTORIS: ICD-10-CM

## 2025-04-23 DIAGNOSIS — E78.2 MIXED HYPERLIPIDEMIA: ICD-10-CM

## 2025-04-23 DIAGNOSIS — K56.699 DIVERTICULAR STRICTURE (HCC): ICD-10-CM

## 2025-04-23 DIAGNOSIS — N39.3 STRESS INCONTINENCE OF URINE: ICD-10-CM

## 2025-04-23 LAB
ALBUMIN SERPL-MCNC: 4.1 G/DL (ref 3.5–5)
ALBUMIN/GLOB SERPL: 1 (ref 1.1–2.2)
ALP SERPL-CCNC: 87 U/L (ref 45–117)
ALT SERPL-CCNC: 24 U/L (ref 12–78)
ANION GAP SERPL CALC-SCNC: 6 MMOL/L (ref 2–12)
AST SERPL-CCNC: 12 U/L (ref 15–37)
BILIRUB SERPL-MCNC: 0.5 MG/DL (ref 0.2–1)
BUN SERPL-MCNC: 16 MG/DL (ref 6–20)
BUN/CREAT SERPL: 12 (ref 12–20)
CALCIUM SERPL-MCNC: 9.8 MG/DL (ref 8.5–10.1)
CHLORIDE SERPL-SCNC: 104 MMOL/L (ref 97–108)
CO2 SERPL-SCNC: 30 MMOL/L (ref 21–32)
CREAT SERPL-MCNC: 1.3 MG/DL (ref 0.55–1.02)
ERYTHROCYTE [DISTWIDTH] IN BLOOD BY AUTOMATED COUNT: 16.5 % (ref 11.5–14.5)
EST. AVERAGE GLUCOSE BLD GHB EST-MCNC: 117 MG/DL
GLOBULIN SER CALC-MCNC: 4 G/DL (ref 2–4)
GLUCOSE SERPL-MCNC: 106 MG/DL (ref 65–100)
HBA1C MFR BLD: 5.7 % (ref 4–5.6)
HCT VFR BLD AUTO: 39.8 % (ref 35–47)
HGB BLD-MCNC: 12 G/DL (ref 11.5–16)
MCH RBC QN AUTO: 22 PG (ref 26–34)
MCHC RBC AUTO-ENTMCNC: 30.2 G/DL (ref 30–36.5)
MCV RBC AUTO: 73 FL (ref 80–99)
NRBC # BLD: 0 K/UL (ref 0–0.01)
NRBC BLD-RTO: 0 PER 100 WBC
PLATELET # BLD AUTO: 296 K/UL (ref 150–400)
PMV BLD AUTO: 11.2 FL (ref 8.9–12.9)
POTASSIUM SERPL-SCNC: 3.2 MMOL/L (ref 3.5–5.1)
PROT SERPL-MCNC: 8.1 G/DL (ref 6.4–8.2)
RBC # BLD AUTO: 5.45 M/UL (ref 3.8–5.2)
SODIUM SERPL-SCNC: 140 MMOL/L (ref 136–145)
WBC # BLD AUTO: 7.5 K/UL (ref 3.6–11)

## 2025-04-23 PROCEDURE — 99214 OFFICE O/P EST MOD 30 MIN: CPT | Performed by: INTERNAL MEDICINE

## 2025-04-23 PROCEDURE — 1159F MED LIST DOCD IN RCRD: CPT | Performed by: INTERNAL MEDICINE

## 2025-04-23 PROCEDURE — G0439 PPPS, SUBSEQ VISIT: HCPCS | Performed by: INTERNAL MEDICINE

## 2025-04-23 PROCEDURE — 1126F AMNT PAIN NOTED NONE PRSNT: CPT | Performed by: INTERNAL MEDICINE

## 2025-04-23 PROCEDURE — 3078F DIAST BP <80 MM HG: CPT | Performed by: INTERNAL MEDICINE

## 2025-04-23 PROCEDURE — 3074F SYST BP LT 130 MM HG: CPT | Performed by: INTERNAL MEDICINE

## 2025-04-23 PROCEDURE — 1160F RVW MEDS BY RX/DR IN RCRD: CPT | Performed by: INTERNAL MEDICINE

## 2025-04-23 PROCEDURE — 1123F ACP DISCUSS/DSCN MKR DOCD: CPT | Performed by: INTERNAL MEDICINE

## 2025-04-23 RX ORDER — TRIAMCINOLONE ACETONIDE 1 MG/G
CREAM TOPICAL
Qty: 60 G | Refills: 0 | Status: SHIPPED | OUTPATIENT
Start: 2025-04-23

## 2025-04-23 ASSESSMENT — PATIENT HEALTH QUESTIONNAIRE - PHQ9
SUM OF ALL RESPONSES TO PHQ QUESTIONS 1-9: 0
1. LITTLE INTEREST OR PLEASURE IN DOING THINGS: NOT AT ALL
SUM OF ALL RESPONSES TO PHQ QUESTIONS 1-9: 0
2. FEELING DOWN, DEPRESSED OR HOPELESS: NOT AT ALL
SUM OF ALL RESPONSES TO PHQ QUESTIONS 1-9: 0
SUM OF ALL RESPONSES TO PHQ QUESTIONS 1-9: 0

## 2025-04-23 ASSESSMENT — LIFESTYLE VARIABLES
HOW MANY STANDARD DRINKS CONTAINING ALCOHOL DO YOU HAVE ON A TYPICAL DAY: PATIENT DOES NOT DRINK
HOW OFTEN DO YOU HAVE A DRINK CONTAINING ALCOHOL: NEVER

## 2025-04-23 NOTE — PROGRESS NOTES
\"Have you been to the ER, urgent care clinic since your last visit?  Hospitalized since your last visit?\"    NO    “Have you seen or consulted any other health care providers outside our system since your last visit?”    NO           
Yes Florecita Fernandes MD   Multiple Vitamin (MULTIVITAMIN ADULT PO) Take 1 tablet by mouth daily Yes ProviderKenya MD   dorzolamide-timolol (COSOPT) 2-0.5 % ophthalmic solution Place 1 drop into the right eye 2 times daily Yes Provider, MD Kenya   estradiol (ESTRACE) 0.1 MG/GM vaginal cream Place 1 g vaginally daily Yes ProviderKenya MD   MYRBETRIQ 50 MG TB24 Take 50 mg by mouth daily Yes ProviderKenya MD   fexofenadine (ALLEGRA ALLERGY) 180 MG tablet Take 1 tablet by mouth daily Yes Provider, MD Kenya   vitamin D3 (CHOLECALCIFEROL) 10 MCG (400 UNIT) TABS tablet Take 1 tablet by mouth daily Yes ProviderKenya MD   potassium chloride (KLOR-CON M) 20 MEQ extended release tablet Take 1 tablet by mouth daily Yes Floreciat Fernandes MD   aspirin 81 MG EC tablet Take 1 tablet by mouth daily Yes ProviderKenya MD       CareTeam (Including outside providers/suppliers regularly involved in providing care):   Patient Care Team:  Florecita Fernandes MD as PCP - General  Florecita Fernandes MD as PCP - [facility]  Celia Butts MD as Consulting Physician  Nicolas Costa MD (General Surgery)     Recommendations for Preventive Services Due: see orders and patient instructions/AVS.  Recommended screening schedule for the next 5-10 years is provided to the patient in written form: see Patient Instructions/AVS.     Reviewed and updated this visit:  Tobacco  Allergies  Meds  Med Hx  Surg Hx  Fam Hx  Sexual Hx          ACP on file. SDM is her  (Kennedy Dill).       Pt lives with             Colonoscopy: 4/1/24 Dr Gtz,5 year repeat,  AAA: CT abd 10/18, negative, US 1/24- nl  Pap: Dr. Butts, 7/15, every other year, hysterectomy and BSO in 1994, will only f/u prn  Mammogram: 2/3/25 neg annual   DEXA: 6/24/24 - osteopenia q2yr    Tdap: 4/08/2015 due  Pneumovax: 9/02/2014  [id number]: 11/03/2016  Prevnar 20: 4/23/24   Zostavax: 2010    Shingrix: 
  Component Value Date    CHOL 147 10/23/2024    TRIG 110 10/23/2024    HDL 49 10/23/2024     Lab Results   Component Value Date    CREATININE 1.10 (H) 10/23/2024    BUN 21 (H) 10/23/2024     10/23/2024    K 4.2 10/23/2024     10/23/2024    CO2 31 10/23/2024       Review of Systems   Respiratory:  Negative for shortness of breath.    Cardiovascular:  Negative for chest pain.         Physical Exam  Constitutional:       General: She is not in acute distress.     Appearance: She is not ill-appearing, toxic-appearing or diaphoretic.   HENT:      Head: Normocephalic and atraumatic.   Eyes:      General:         Right eye: No discharge.         Left eye: No discharge.      Extraocular Movements: Extraocular movements intact.   Neck:      Vascular: No carotid bruit.   Cardiovascular:      Rate and Rhythm: Normal rate and regular rhythm.      Heart sounds: Normal heart sounds. No murmur heard.  Pulmonary:      Effort: Pulmonary effort is normal. No respiratory distress.      Breath sounds: Normal breath sounds. No wheezing.   Musculoskeletal:         General: No swelling, tenderness or deformity.      Cervical back: Normal range of motion and neck supple. No tenderness.      Right lower leg: No edema.      Left lower leg: No edema.   Lymphadenopathy:      Cervical: No cervical adenopathy.   Skin:     General: Skin is warm and dry.      Findings: No erythema.   Neurological:      General: No focal deficit present.      Mental Status: She is oriented to person, place, and time. Mental status is at baseline.      Gait: Gait normal.   Psychiatric:         Mood and Affect: Mood normal.           ASSESSMENT and PLAN   Diagnosis Orders   1. Primary hypertension  Comprehensive Metabolic Panel    Hemoglobin A1C     Continues on dyazide 37.5-25mg and felodipine 5mg daily     Controlled current regimen continue no changes check metabolic panel for K creatinine sales       2. Medicare annual wellness visit, subsequent

## 2025-04-24 ENCOUNTER — RESULTS FOLLOW-UP (OUTPATIENT)
Age: 80
End: 2025-04-24

## 2025-04-24 DIAGNOSIS — I10 PRIMARY HYPERTENSION: Primary | ICD-10-CM

## 2025-04-30 RX ORDER — POTASSIUM CHLORIDE 1500 MG/1
20 TABLET, EXTENDED RELEASE ORAL DAILY
Qty: 90 TABLET | Refills: 2 | Status: SHIPPED | OUTPATIENT
Start: 2025-04-30

## 2025-05-22 ENCOUNTER — LAB (OUTPATIENT)
Age: 80
End: 2025-05-22

## 2025-05-22 DIAGNOSIS — I10 PRIMARY HYPERTENSION: ICD-10-CM

## 2025-05-22 LAB
ANION GAP SERPL CALC-SCNC: 3 MMOL/L (ref 2–12)
BUN SERPL-MCNC: 23 MG/DL (ref 6–20)
BUN/CREAT SERPL: 19 (ref 12–20)
CALCIUM SERPL-MCNC: 9.7 MG/DL (ref 8.5–10.1)
CHLORIDE SERPL-SCNC: 106 MMOL/L (ref 97–108)
CO2 SERPL-SCNC: 30 MMOL/L (ref 21–32)
CREAT SERPL-MCNC: 1.21 MG/DL (ref 0.55–1.02)
GLUCOSE SERPL-MCNC: 111 MG/DL (ref 65–100)
POTASSIUM SERPL-SCNC: 3.8 MMOL/L (ref 3.5–5.1)
SODIUM SERPL-SCNC: 139 MMOL/L (ref 136–145)

## 2025-05-23 ENCOUNTER — RESULTS FOLLOW-UP (OUTPATIENT)
Age: 80
End: 2025-05-23

## 2025-07-07 RX ORDER — TRIAMTERENE AND HYDROCHLOROTHIAZIDE 37.5; 25 MG/1; MG/1
1 CAPSULE ORAL DAILY
Qty: 90 CAPSULE | Refills: 0 | Status: SHIPPED | OUTPATIENT
Start: 2025-07-07

## 2025-07-07 RX ORDER — FELODIPINE 5 MG/1
5 TABLET, EXTENDED RELEASE ORAL DAILY
Qty: 90 TABLET | Refills: 0 | Status: SHIPPED | OUTPATIENT
Start: 2025-07-07

## 2025-07-07 NOTE — TELEPHONE ENCOUNTER
Future Appointments:  Future Appointments   Date Time Provider Department Center   10/28/2025  8:45 AM Florecita Fernandes MD [facility] E[facility] DEP        Last Appointment With Me:  4/23/2025     Requested Prescriptions     Pending Prescriptions Disp Refills    triamterene-hydroCHLOROthiazide (DYAZIDE) 37.5-25 MG per capsule 90 capsule 1     Sig: Take 1 capsule by mouth daily    felodipine (PLENDIL) 5 MG extended release tablet 90 tablet 1     Sig: Take 1 tablet by mouth daily

## 2025-07-07 NOTE — TELEPHONE ENCOUNTER
Caller requests Refill of:  triamterene-hydroCHLOROthiazide (DYAZIDE) 37.5-25 MG per capsule  felodipine (PLENDIL) 5 MG extended release tablet    Please send to:    [facility] - [address] - [address] [facility] - P [phone] - F [phone]  [facility]  [place] [address]  Phone: [phone] Fax: [phone]         Visit / Appointment History:  Future Appointment at [facility]:  10/28/2025   Last Appointment With PCP:  4/23/2025       Caller confirmed instructions and dosages as correct.    Caller was advised that Meds will be refilled as soon as possible, however there can be a 48-72 business hour turn around on refill requests.

## 2025-07-17 ENCOUNTER — ANESTHESIA (OUTPATIENT)
Facility: HOSPITAL | Age: 80
End: 2025-07-17
Payer: MEDICARE

## 2025-07-17 ENCOUNTER — ANESTHESIA EVENT (OUTPATIENT)
Facility: HOSPITAL | Age: 80
End: 2025-07-17
Payer: MEDICARE

## 2025-07-17 ENCOUNTER — HOSPITAL ENCOUNTER (OUTPATIENT)
Facility: HOSPITAL | Age: 80
Setting detail: OUTPATIENT SURGERY
Discharge: HOME | End: 2025-07-17
Attending: INTERNAL MEDICINE | Admitting: INTERNAL MEDICINE
Payer: MEDICARE

## 2025-07-17 VITALS
HEART RATE: 87 BPM | DIASTOLIC BLOOD PRESSURE: 65 MMHG | OXYGEN SATURATION: 97 % | RESPIRATION RATE: 16 BRPM | TEMPERATURE: 97.5 F | BODY MASS INDEX: 24.32 KG/M2 | WEIGHT: 146 LBS | HEIGHT: 65 IN | SYSTOLIC BLOOD PRESSURE: 131 MMHG

## 2025-07-17 PROCEDURE — 7100000011 HC PHASE II RECOVERY - ADDTL 15 MIN: Performed by: INTERNAL MEDICINE

## 2025-07-17 PROCEDURE — 2709999900 HC NON-CHARGEABLE SUPPLY: Performed by: INTERNAL MEDICINE

## 2025-07-17 PROCEDURE — 3600007502: Performed by: INTERNAL MEDICINE

## 2025-07-17 PROCEDURE — 6360000002 HC RX W HCPCS: Performed by: REGISTERED NURSE

## 2025-07-17 PROCEDURE — 3700000000 HC ANESTHESIA ATTENDED CARE: Performed by: INTERNAL MEDICINE

## 2025-07-17 PROCEDURE — 3700000001 HC ADD 15 MINUTES (ANESTHESIA): Performed by: INTERNAL MEDICINE

## 2025-07-17 PROCEDURE — 3600007512: Performed by: INTERNAL MEDICINE

## 2025-07-17 PROCEDURE — 7100000010 HC PHASE II RECOVERY - FIRST 15 MIN: Performed by: INTERNAL MEDICINE

## 2025-07-17 PROCEDURE — 2580000003 HC RX 258: Performed by: INTERNAL MEDICINE

## 2025-07-17 RX ORDER — SODIUM CHLORIDE 0.9 % (FLUSH) 0.9 %
5-40 SYRINGE (ML) INJECTION PRN
Status: DISCONTINUED | OUTPATIENT
Start: 2025-07-17 | End: 2025-07-17 | Stop reason: HOSPADM

## 2025-07-17 RX ORDER — SODIUM CHLORIDE 9 MG/ML
INJECTION, SOLUTION INTRAVENOUS CONTINUOUS
Status: DISCONTINUED | OUTPATIENT
Start: 2025-07-17 | End: 2025-07-17 | Stop reason: HOSPADM

## 2025-07-17 RX ORDER — SIMETHICONE 40MG/0.6ML
40 SUSPENSION, DROPS(FINAL DOSAGE FORM)(ML) ORAL AS NEEDED
Status: DISCONTINUED | OUTPATIENT
Start: 2025-07-17 | End: 2025-07-17 | Stop reason: HOSPADM

## 2025-07-17 RX ORDER — SODIUM CHLORIDE 9 MG/ML
INJECTION, SOLUTION INTRAVENOUS CONTINUOUS PRN
Status: COMPLETED | OUTPATIENT
Start: 2025-07-17 | End: 2025-07-17

## 2025-07-17 RX ADMIN — PROPOFOL 50 MG: 10 INJECTION, EMULSION INTRAVENOUS at 09:41

## 2025-07-17 RX ADMIN — PROPOFOL 150 MG: 10 INJECTION, EMULSION INTRAVENOUS at 09:55

## 2025-07-17 RX ADMIN — LIDOCAINE HYDROCHLORIDE 50 MG: 20 INJECTION, SOLUTION EPIDURAL; INFILTRATION; INTRACAUDAL; PERINEURAL at 09:41

## 2025-07-17 ASSESSMENT — PAIN - FUNCTIONAL ASSESSMENT: PAIN_FUNCTIONAL_ASSESSMENT: NONE - DENIES PAIN

## 2025-07-17 NOTE — OP NOTE
[facility]  [facility]  Carlos Enrique Rapp MD, [facility]  [phone]         2025    Colonoscopy Procedure Note  Ilana Dill  :  1945  [facility] [id number]    Indications:   Hx of colon polyps  PCP:  Florecita Fernandes MD  Anesthesia/Sedation: TIVA  Endoscopist:  Dr. [place] Enrique Rapp  Complications:  None  Estimated Blood Loss:  None    Permit:  The indications, risks, benefits and alternatives were reviewed with the patient or their decision maker who was provided an opportunity to ask questions and all questions were answered.  The specific risks of colonoscopy with conscious sedation were reviewed, including but not limited to anesthetic complication, bleeding, adverse drug reaction, missed lesion, infection, IV site reactions, and intestinal perforation which would lead to the need for surgical repair.  Alternatives to colonoscopy including radiographic imaging, observation without testing, or laboratory testing were reviewed including the limitations of those alternatives.  After considering the options and having all their questions answered, the patient or their decision maker provided both verbal and written consent to proceed.        Procedure in Detail:  After obtaining informed consent, positioning of the patient in the left lateral decubitus position, and conduction of a pre-procedure pause or \"time out\" the endoscope was introduced into the anus and advanced to the transverse colon.  The quality of the colonic preparation was inadequate.  A careful inspection was made as the colonoscope was withdrawn, findings and interventions are described below.    Findings:   - SHAMA Normal  - There is severe diverticulosis in the sigmoid, descending colon, numerous stool balls present-resulting in poor visibility. Scope was advanced upto transverse colon but aborted afterwards.   - Retroflexion in the

## 2025-07-17 NOTE — ANESTHESIA POSTPROCEDURE EVALUATION
Department of Anesthesiology  Postprocedure Note    Patient: Ilana Dill  MRN: [id number]  YOB: 1945  Date of evaluation: 7/17/2025    Procedure Summary       Date: 07/17/25 Room / Location: [facility] ENDO 01 / [facility] ENDOSCOPY    Anesthesia Start: 0933 Anesthesia Stop: 1003    Procedure: COLONOSCOPY (Lower GI Region) Diagnosis:       Hx of colonic polyps      (Hx of colonic polyps [Z86.0100])    Surgeons: Keeley Rapp MD Responsible Provider: Sathish Bo MD    Anesthesia Type: MAC ASA Status: 2            Anesthesia Type: MAC    Ilir Phase I: Ilir Score: 10    Ilir Phase II: Ilir Score: 10    Anesthesia Post Evaluation    Patient location during evaluation: PACU  Patient participation: complete - patient participated  Level of consciousness: awake and alert  Airway patency: patent  Nausea & Vomiting: no nausea and no vomiting  Cardiovascular status: hemodynamically stable  Respiratory status: acceptable  Hydration status: stable    No notable events documented.

## 2025-07-17 NOTE — H&P
Highest education level: None   Tobacco Use    Smoking status: Never    Smokeless tobacco: Never   Vaping Use    Vaping status: Never Used   Substance and Sexual Activity    Alcohol use: No    Drug use: No    Sexual activity: Not Currently     Birth control/protection: Surgical     Social Drivers of Health     Financial Resource Strain: Low Risk  (10/21/2024)    Overall Financial Resource Strain (CARDIA)     Difficulty of Paying Living Expenses: Not hard at all   Transportation Needs: No Transportation Needs (10/21/2024)    PRAPARE - Transportation     Lack of Transportation (Medical): No     Lack of Transportation (Non-Medical): No   Physical Activity: Sufficiently Active (4/23/2025)    Exercise Vital Sign     Days of Exercise per Week: 7 days     Minutes of Exercise per Session: 60 min   Housing Stability: Low Risk  (4/20/2025)    Housing Stability Vital Sign     Unable to Pay for Housing in the Last Year: No     Number of Times Moved in the Last Year: 0     Homeless in the Last Year: No      Family History   Problem Relation Age of Onset    Hypertension Mother     Cancer Brother         prostate    Alzheimer's Disease Brother        Medications:   Prior to Admission medications    Medication Sig Start Date End Date Taking? Authorizing Provider   triamterene-hydroCHLOROthiazide (DYAZIDE) 37.5-25 MG per capsule Take 1 capsule by mouth daily 7/7/25  Yes Florecita Fernandes MD   felodipine (PLENDIL) 5 MG extended release tablet Take 1 tablet by mouth daily 7/7/25  Yes Florecita Fernandes MD   triamcinolone (KENALOG) 0.1 % cream Apply topically 2 times daily.prn, mon-Friday only 4/23/25  Yes Florecita Fernandes MD   atorvastatin (LIPITOR) 20 MG tablet TAKE 1 TABLET DAILY 3/28/25  Yes Florecita Fernandes MD   Multiple Vitamin (MULTIVITAMIN ADULT PO) Take 1 tablet by mouth daily   Yes Provider, MD Kenya   dorzolamide-timolol (COSOPT) 2-0.5 % ophthalmic solution Place 1 drop into the right eye 2 times daily 6/5/24  Yes

## 2025-07-17 NOTE — ANESTHESIA PRE PROCEDURE
Department of Anesthesiology  Preprocedure Note       Name:  Ilana Dill   Age:  79 y.o.  :  1945                                          MRN:  [id number]         Date:  2025      Surgeon: Surgeon(s):  Keeley Rapp MD    Procedure: Procedure(s):  COLONOSCOPY    Medications prior to admission:   Prior to Admission medications    Medication Sig Start Date End Date Taking? Authorizing Provider   triamterene-hydroCHLOROthiazide (DYAZIDE) 37.5-25 MG per capsule Take 1 capsule by mouth daily 25   Florecita Fernandes MD   felodipine (PLENDIL) 5 MG extended release tablet Take 1 tablet by mouth daily 25   Florecita Fernandes MD   potassium chloride (KLOR-CON M) 20 MEQ extended release tablet TAKE 1 TABLET BY MOUTH EVERY DAY 25   Florecita Fernandes MD   triamcinolone (KENALOG) 0.1 % cream Apply topically 2 times daily.prn, mon-Friday only 25   Florecita Fernandes MD   atorvastatin (LIPITOR) 20 MG tablet TAKE 1 TABLET DAILY 3/28/25   Florecita Fernandes MD   Multiple Vitamin (MULTIVITAMIN ADULT PO) Take 1 tablet by mouth daily    Kenya Durbin MD   dorzolamide-timolol (COSOPT) 2-0.5 % ophthalmic solution Place 1 drop into the right eye 2 times daily 24   Kenya Durbin MD   estradiol (ESTRACE) 0.1 MG/GM vaginal cream Place 1 g vaginally daily 24   Kenya Durbin MD   MYRBETRIQ 50 MG TB24 Take 50 mg by mouth daily 24   Kenya Durbin MD   fexofenadine (ALLEGRA ALLERGY) 180 MG tablet Take 1 tablet by mouth daily    Kenya Durbin MD   vitamin D3 (CHOLECALCIFEROL) 10 MCG (400 UNIT) TABS tablet Take 1 tablet by mouth daily    Kenya Durbin MD   aspirin 81 MG EC tablet Take 1 tablet by mouth daily    Kenya Durbin MD       Current medications:    No current facility-administered medications for this encounter.     Current Outpatient Medications   Medication Sig Dispense Refill   • triamterene-hydroCHLOROthiazide (DYAZIDE)

## 2025-07-17 NOTE — PROGRESS NOTES
Endoscopy Case End Note:    09:58:  Procedure scope was pre-cleaned, per protocol, at bedside by mEil.      09:58:  Report received from anesthesia - Laura ZAMORA.  See anesthesia flowsheet for intra-procedure vital signs and events.

## 2025-07-17 NOTE — DISCHARGE INSTRUCTIONS
[facility]  [facility]  Carlos Enrique Rapp MD, [facility]  [phone]         July 17, 2025    Ilana Dill  YOB: 1945    COLONOSCOPY DISCHARGE INSTRUCTIONS    If there is redness at IV site you should apply warm compress to area.  If redness or soreness persist contact Dr. Rapp' or your primary care doctor.    There may be a slight amount of blood passed from the rectum.  Gaseous discomfort may develop, but walking, belching will help relieve this.  You may not operate a vehicle for 12 hours  You may not operate machinery or dangerous appliances for rest of today  You may not drink alcoholic beverages for 12 hours  Avoid making any critical decisions for 24 hours    DIET:  You may resume your normal diet, but some patients find that heavy or large meals may lead to indigestion or vomiting.  I suggest a light meal as first food intake.    MEDICATIONS:  The use of some over-the-counter pain medication may lead to bleeding after colon biopsies or polyp removal.  Tylenol (also called acetaminophen) is safe to take even if you have had colonoscopy with polyp removal. Remember that Tylenol (also called acetaminophen) is safe to take after colonoscopy even if you have had biopsies or polyps removed.    ACTIVITY:  You may resume your normal household activities, but it is recommended that you spend the remainder of the day resting -  avoid any strenuous activity.    CALL DR. Rapp' OFFICE IF:  Increasing pain, nausea, vomiting  Abdominal distension (swelling)  Significant new or increased bleeding (oral or rectal)  Fever/Chills  Chest pain/shortness of breath                       Additional instructions:   Impression:  - Severe diverticulosis, poor bowel prep due to numerous stool balls    Recommendations:   - Plan to add more prep today and plan colonoscopy tomorrow otherwise- 2 day prep needed or future colonoscopies.   - Clear

## 2025-07-17 NOTE — PROGRESS NOTES
ARRIVAL INFORMATION:  Verified patient name and date of birth, scheduled procedure, and informed consent.     : Kennedy Spouse contact number: [phone]  Physician and staff can share information with the .     Receive texts: yes    Belongings with patient include:  Clothing,None    GI FOCUSED ASSESSMENT:  Neuro: Awake, alert, oriented x4  Respiratory: even and unlabored   GI: soft and non-distended  EKG Rhythm: normal sinus rhythm    Education:Reviewed general discharge instructions and  information.

## 2025-07-18 ENCOUNTER — HOSPITAL ENCOUNTER (OUTPATIENT)
Facility: HOSPITAL | Age: 80
Setting detail: OUTPATIENT SURGERY
Discharge: HOME | End: 2025-07-18
Attending: INTERNAL MEDICINE | Admitting: INTERNAL MEDICINE
Payer: MEDICARE

## 2025-07-18 ENCOUNTER — ANESTHESIA (OUTPATIENT)
Facility: HOSPITAL | Age: 80
End: 2025-07-18
Payer: MEDICARE

## 2025-07-18 ENCOUNTER — ANESTHESIA EVENT (OUTPATIENT)
Facility: HOSPITAL | Age: 80
End: 2025-07-18
Payer: MEDICARE

## 2025-07-18 VITALS
SYSTOLIC BLOOD PRESSURE: 123 MMHG | WEIGHT: 146 LBS | OXYGEN SATURATION: 98 % | HEART RATE: 90 BPM | BODY MASS INDEX: 24.32 KG/M2 | RESPIRATION RATE: 14 BRPM | TEMPERATURE: 98 F | HEIGHT: 65 IN | DIASTOLIC BLOOD PRESSURE: 62 MMHG

## 2025-07-18 PROCEDURE — 3600007512: Performed by: INTERNAL MEDICINE

## 2025-07-18 PROCEDURE — 3600007502: Performed by: INTERNAL MEDICINE

## 2025-07-18 PROCEDURE — 6360000002 HC RX W HCPCS: Performed by: NURSE ANESTHETIST, CERTIFIED REGISTERED

## 2025-07-18 PROCEDURE — 2709999900 HC NON-CHARGEABLE SUPPLY: Performed by: INTERNAL MEDICINE

## 2025-07-18 PROCEDURE — 2580000003 HC RX 258: Performed by: INTERNAL MEDICINE

## 2025-07-18 PROCEDURE — 7100000011 HC PHASE II RECOVERY - ADDTL 15 MIN: Performed by: INTERNAL MEDICINE

## 2025-07-18 PROCEDURE — 7100000010 HC PHASE II RECOVERY - FIRST 15 MIN: Performed by: INTERNAL MEDICINE

## 2025-07-18 PROCEDURE — 3700000001 HC ADD 15 MINUTES (ANESTHESIA): Performed by: INTERNAL MEDICINE

## 2025-07-18 PROCEDURE — 3700000000 HC ANESTHESIA ATTENDED CARE: Performed by: INTERNAL MEDICINE

## 2025-07-18 RX ORDER — SODIUM CHLORIDE 9 MG/ML
INJECTION, SOLUTION INTRAVENOUS CONTINUOUS PRN
Status: COMPLETED | OUTPATIENT
Start: 2025-07-18 | End: 2025-07-18

## 2025-07-18 RX ORDER — SIMETHICONE 40MG/0.6ML
40 SUSPENSION, DROPS(FINAL DOSAGE FORM)(ML) ORAL AS NEEDED
Status: DISCONTINUED | OUTPATIENT
Start: 2025-07-18 | End: 2025-07-18 | Stop reason: HOSPADM

## 2025-07-18 RX ORDER — SODIUM CHLORIDE 0.9 % (FLUSH) 0.9 %
5-40 SYRINGE (ML) INJECTION PRN
Status: DISCONTINUED | OUTPATIENT
Start: 2025-07-18 | End: 2025-07-18 | Stop reason: HOSPADM

## 2025-07-18 RX ORDER — SODIUM CHLORIDE 9 MG/ML
INJECTION, SOLUTION INTRAVENOUS CONTINUOUS
Status: DISCONTINUED | OUTPATIENT
Start: 2025-07-18 | End: 2025-07-18 | Stop reason: HOSPADM

## 2025-07-18 RX ADMIN — LIDOCAINE HYDROCHLORIDE 40 MG: 20 INJECTION, SOLUTION EPIDURAL; INFILTRATION; INTRACAUDAL; PERINEURAL at 07:41

## 2025-07-18 RX ADMIN — PROPOFOL 50 MG: 10 INJECTION, EMULSION INTRAVENOUS at 07:43

## 2025-07-18 RX ADMIN — PROPOFOL 50 MG: 10 INJECTION, EMULSION INTRAVENOUS at 07:55

## 2025-07-18 RX ADMIN — PROPOFOL 50 MG: 10 INJECTION, EMULSION INTRAVENOUS at 07:47

## 2025-07-18 RX ADMIN — PROPOFOL 20 MG: 10 INJECTION, EMULSION INTRAVENOUS at 08:01

## 2025-07-18 RX ADMIN — PROPOFOL 50 MG: 10 INJECTION, EMULSION INTRAVENOUS at 07:51

## 2025-07-18 ASSESSMENT — PAIN - FUNCTIONAL ASSESSMENT: PAIN_FUNCTIONAL_ASSESSMENT: NONE - DENIES PAIN

## 2025-07-18 NOTE — H&P
[facility]  Outpatient History and Physical    Patient: Ilana Dill    Physician: Keeley Rapp MD    Vital Signs: Blood pressure (!) 149/72, pulse 94, temperature 98 °F (36.7 °C), resp. rate 19, height 1.651 m (5' 5\"), weight 66.2 kg (146 lb), SpO2 97%.    Allergies:   No Known Allergies    Chief Complaint: Hx of colon polyps, poor prep yesterday    History of Present Illness: Hx of colon polyps, poor prep yesterday    Indication for Procedure: Hx of colon polyps, poor prep yesterday    History:  Past Medical History:   Diagnosis Date    Dyspepsia and other specified disorders of function of stomach     GERD (gastroesophageal reflux disease)     H/O allergy     Hypercholesterolemia     Hypertension     Indigestion       Past Surgical History:   Procedure Laterality Date    CATARACT REMOVAL Bilateral 2006     SECTION  1974    CHOLECYSTECTOMY  14    lap bhaskar with cholangiogram    COLONOSCOPY N/A 2019    COLONOSCOPY performed by Nicolas Hargrove MD at [facility] AMBULATORY OR    COLONOSCOPY N/A 2025    COLONOSCOPY performed by Keeley Rapp MD at [facility] ENDOSCOPY    CYSTOSCOPY  2023    CYSTOSCOPY performed by Richar Winkler MD at [facility] MAIN OR    HYSTERECTOMY (CERVIX STATUS UNKNOWN)      MULTIPLE TOOTH EXTRACTIONS      Dental implant    OTHER SURGICAL HISTORY  14    ERCPwith biliary sphincterotomy CBD balloon sweeps      PROCTOSIGMOIDOSCOPY N/A 2023    . performed by Casey Gtz II, MD at [facility] MAIN OR    SIGMOID COLECTOMY N/A 2023    ROBOTIC ASSISTED SIGMOID HEMICOLECTOMY WITH FLEXIBLE SIGMOIDOSCOPY,CYSTOSCOPY WITH INSERTION BILATERAL URETERALCATHETERS  (E R A S) performed by Casey Gtz II, MD at [facility] MAIN OR    TUBAL LIGATION      VENTRAL HERNIA REPAIR N/A 2024    Robotic-assisted laparoscopic incisional hernia repair x2.  Total length is 12 cm. performed by Nicolas Costa MD at [facility] MAIN OR      Social History      Socioeconomic History    Marital status:      Spouse name: None    Number of children: None    Years of education: None    Highest education level: None   Tobacco Use    Smoking status: Never    Smokeless tobacco: Never   Vaping Use    Vaping status: Never Used   Substance and Sexual Activity    Alcohol use: No    Drug use: No    Sexual activity: Not Currently     Birth control/protection: Surgical     Social Drivers of Health     Financial Resource Strain: Low Risk  (10/21/2024)    Overall Financial Resource Strain (CARDIA)     Difficulty of Paying Living Expenses: Not hard at all   Transportation Needs: No Transportation Needs (10/21/2024)    PRAPARE - Transportation     Lack of Transportation (Medical): No     Lack of Transportation (Non-Medical): No   Physical Activity: Sufficiently Active (4/23/2025)    Exercise Vital Sign     Days of Exercise per Week: 7 days     Minutes of Exercise per Session: 60 min   Housing Stability: Low Risk  (4/20/2025)    Housing Stability Vital Sign     Unable to Pay for Housing in the Last Year: No     Number of Times Moved in the Last Year: 0     Homeless in the Last Year: No      Family History   Problem Relation Age of Onset    Hypertension Mother     Cancer Brother         prostate    Alzheimer's Disease Brother        Medications:   Prior to Admission medications    Medication Sig Start Date End Date Taking? Authorizing Provider   felodipine (PLENDIL) 5 MG extended release tablet Take 1 tablet by mouth daily 7/7/25  Yes Florecita Fernandes MD   potassium chloride (KLOR-CON M) 20 MEQ extended release tablet TAKE 1 TABLET BY MOUTH EVERY DAY 4/30/25  Yes Florecita Fernandes MD   atorvastatin (LIPITOR) 20 MG tablet TAKE 1 TABLET DAILY 3/28/25  Yes Florecita Fernandes MD   Multiple Vitamin (MULTIVITAMIN ADULT PO) Take 1 tablet by mouth daily   Yes Provider, MD Kenya   dorzolamide-timolol (COSOPT) 2-0.5 % ophthalmic solution Place 1 drop into the right eye 2 times daily

## 2025-07-18 NOTE — PROGRESS NOTES
Endoscopy Case End Note:     Procedure scope was pre-cleaned, per protocol, at bedside by MELODY Amaral.       Report received from anesthesia.  See anesthesia flowsheet for intra-procedure vital signs and events.    Belongings remain under stretcher with patient.

## 2025-07-18 NOTE — ANESTHESIA PRE PROCEDURE
Department of Anesthesiology  Preprocedure Note       Name:  Ilana Dill   Age:  79 y.o.  :  1945                                          MRN:  [id number]         Date:  2025      Surgeon: Surgeon(s):  Keeley Rapp MD    Procedure: Procedure(s):  COLONOSCOPY    Medications prior to admission:   Prior to Admission medications    Medication Sig Start Date End Date Taking? Authorizing Provider   triamterene-hydroCHLOROthiazide (DYAZIDE) 37.5-25 MG per capsule Take 1 capsule by mouth daily 25   Florecita Fernandes MD   felodipine (PLENDIL) 5 MG extended release tablet Take 1 tablet by mouth daily 25   Florecita Fernandes MD   potassium chloride (KLOR-CON M) 20 MEQ extended release tablet TAKE 1 TABLET BY MOUTH EVERY DAY 25   Florecita Fernandes MD   triamcinolone (KENALOG) 0.1 % cream Apply topically 2 times daily.prn, mon-Friday only 25   Florecita Fernandes MD   atorvastatin (LIPITOR) 20 MG tablet TAKE 1 TABLET DAILY 3/28/25   Florecita Fernandes MD   Multiple Vitamin (MULTIVITAMIN ADULT PO) Take 1 tablet by mouth daily    Kenya Durbin MD   dorzolamide-timolol (COSOPT) 2-0.5 % ophthalmic solution Place 1 drop into the right eye 2 times daily 24   Kenya Durbin MD   estradiol (ESTRACE) 0.1 MG/GM vaginal cream Place 1 g vaginally daily 24   Kenya Durbin MD   MYRBETRIQ 50 MG TB24 Take 50 mg by mouth daily 24   Kenya Durbin MD   fexofenadine (ALLEGRA ALLERGY) 180 MG tablet Take 1 tablet by mouth daily    Kenya Durbin MD   vitamin D3 (CHOLECALCIFEROL) 10 MCG (400 UNIT) TABS tablet Take 1 tablet by mouth daily    Kenya Durbin MD   aspirin 81 MG EC tablet Take 1 tablet by mouth daily    Kenya Durbin MD       Current medications:    No current facility-administered medications for this encounter.       Allergies:  No Known Allergies    Problem List:    Patient Active Problem List   Diagnosis Code   • Angioedema

## 2025-07-18 NOTE — ANESTHESIA POSTPROCEDURE EVALUATION
Department of Anesthesiology  Postprocedure Note    Patient: Ilana Dill  MRN: [id number]  YOB: 1945  Date of evaluation: 7/18/2025    Procedure Summary       Date: 07/18/25 Room / Location: [facility] ENDO 03 / [facility] ENDOSCOPY    Anesthesia Start: 0739 Anesthesia Stop: 0804    Procedure: COLONOSCOPY (Lower GI Region) Diagnosis:       Hx of colonic polyps      (Hx of colonic polyps [Z86.0100])    Surgeons: Keeley Rapp MD Responsible Provider: Valentino Kimball MD    Anesthesia Type: MAC ASA Status: 3            Anesthesia Type: MAC    Ilir Phase I: Ilir Score: 10    Ilir Phase II: Ilir Score: 10    Anesthesia Post Evaluation    Patient location during evaluation: PACU  Patient participation: complete - patient participated  Level of consciousness: sleepy but conscious and responsive to verbal stimuli  Airway patency: patent  Nausea & Vomiting: no vomiting and no nausea  Cardiovascular status: blood pressure returned to baseline and hemodynamically stable  Respiratory status: acceptable  Hydration status: stable    No notable events documented.

## 2025-07-18 NOTE — OP NOTE
[facility]  [facility]  Carlos Enrique Rapp MD, [facility] [place]  [phone]         2025    Colonoscopy Procedure Note  Ilana Dill  :  1945  [facility] [id number]    Indications:   Hx of colon polyps, poor prep yesterday  PCP:  Florecita Fernandes MD  Anesthesia/Sedation: TIVA  Endoscopist:  Dr. Carlos Enrique Rapp  Complications:  None  Estimated Blood Loss:  None    Permit:  The indications, risks, benefits and alternatives were reviewed with the patient or their decision maker who was provided an opportunity to ask questions and all questions were answered.  The specific risks of colonoscopy with conscious sedation were reviewed, including but not limited to anesthetic complication, bleeding, adverse drug reaction, missed lesion, infection, IV site reactions, and intestinal perforation which would lead to the need for surgical repair.  Alternatives to colonoscopy including radiographic imaging, observation without testing, or laboratory testing were reviewed including the limitations of those alternatives.  After considering the options and having all their questions answered, the patient or their decision maker provided both verbal and written consent to proceed.        Procedure in Detail:  After obtaining informed consent, positioning of the patient in the left lateral decubitus position, and conduction of a pre-procedure pause or \"time out\" the endoscope was introduced into the anus and advanced to the cecum, which was identified by the ileocecal valve and appendiceal orifice.  The quality of the colonic preparation was good.  A careful inspection was made as the colonoscope was withdrawn, findings and interventions are described below.    Findings:   - SHAMA normal  - There is extensive diverticulosis throughout the colon.  Normal colon mucosa otherwise.  - Retroflexion in the rectum revealed small internal  hemorrhoids.    Specimens:    none    Complications:   None; patient tolerated the procedure well.    Impression:  - Pandiverticulosis.    Recommendations:   -Resume diet as tolerated  - Resume all home medications today.  - Recall colonoscopy not indicated by age.    Thank you for entrusting me with this patient's care.  Please do not hesitate to contact me with any questions or if I can be of assistance with any of your other patients' GI needs.    Signed By: Keeley Rapp MD                        July 18, 2025      Surgical assistant none.  Implants none unless specified.

## 2025-07-18 NOTE — DISCHARGE INSTRUCTIONS
[facility]  [facility]  Carlos Enrique Rapp MD, [facility] [place]  [phone]         July 18, 2025    Ilana Dill  YOB: 1945    COLONOSCOPY DISCHARGE INSTRUCTIONS    If there is redness at IV site you should apply warm compress to area.  If redness or soreness persist contact Dr. Rapp' or your primary care doctor.    There may be a slight amount of blood passed from the rectum.  Gaseous discomfort may develop, but walking, belching will help relieve this.  You may not operate a vehicle for 12 hours  You may not operate machinery or dangerous appliances for rest of today  You may not drink alcoholic beverages for 12 hours  Avoid making any critical decisions for 24 hours    DIET:  You may resume your normal diet, but some patients find that heavy or large meals may lead to indigestion or vomiting.  I suggest a light meal as first food intake.    MEDICATIONS:  The use of some over-the-counter pain medication may lead to bleeding after colon biopsies or polyp removal.  Tylenol (also called acetaminophen) is safe to take even if you have had colonoscopy with polyp removal. Remember that Tylenol (also called acetaminophen) is safe to take after colonoscopy even if you have had biopsies or polyps removed.    ACTIVITY:  You may resume your normal household activities, but it is recommended that you spend the remainder of the day resting -  avoid any strenuous activity.    CALL DR. Rapp' OFFICE IF:  Increasing pain, nausea, vomiting  Abdominal distension (swelling)  Significant new or increased bleeding (oral or rectal)  Fever/Chills  Chest pain/shortness of breath                       Additional instructions:   Impression:  - Pandiverticulosis.    Recommendations:   -Resume diet as tolerated  - Resume all home medications today.  - Recall colonoscopy not indicated by age.     It was an honor to be your doctor today.  Please let me or my

## 2025-07-18 NOTE — PROGRESS NOTES
ARRIVAL INFORMATION:  Verified patient name and date of birth, scheduled procedure, and informed consent.     : Kennedy (spouse) contact number: [phone]  Physician and staff can share information with the .     Receive texts: yes    Belongings with patient include:  Clothing    GI FOCUSED ASSESSMENT:  Neuro: Awake, alert, oriented x4  Respiratory: even and unlabored   GI: soft and non-distended  EKG Rhythm: normal sinus rhythm    Education:Reviewed general discharge instructions and  information.

## (undated) DEVICE — 3M™ IOBAN™ 2 ANTIMICROBIAL INCISE DRAPE 6650EZ: Brand: IOBAN™ 2

## (undated) DEVICE — ENDO CARRY-ON PROCEDURE KIT INCLUDES ENZYMATIC SPONGE, GAUZE, BIOHAZARD LABEL, TRAY, LUBRICANT, DIRTY SCOPE LABEL, WATER LABEL, TRAY, DRAWSTRING PAD, AND DEFENDO 4-PIECE KIT.: Brand: ENDO CARRY-ON PROCEDURE KIT

## (undated) DEVICE — GLOVE SURG SZ 75 L12IN FNGR THK79MIL GRN LTX FREE

## (undated) DEVICE — SUTURE V-LOC 180 SZ 0 L12IN ABSRB GRN L37MM GS-21 1/2 CIR VLOCL0316

## (undated) DEVICE — STAPLER EXT 65MM S STL AUTO DISP PURSTRING

## (undated) DEVICE — GENERAL LAPAROSCOPY-MRMC: Brand: MEDLINE INDUSTRIES, INC.

## (undated) DEVICE — TRAP ENDOSCP POLYP 2 CHMBR DRAWER TYP

## (undated) DEVICE — SUTURE SZ 0 27IN 5/8 CIR UR-6  TAPER PT VIOLET ABSRB VICRYL J603H

## (undated) DEVICE — BLADE SURG NO15 C STL REUSE HNDL CONVENTIONAL DISP RIB BK

## (undated) DEVICE — TUBING, SUCTION, 1/4" X 12', STRAIGHT: Brand: MEDLINE

## (undated) DEVICE — YANKAUER,POOLE TIP,STERILE,50/CS: Brand: MEDLINE

## (undated) DEVICE — CANNULA SEAL

## (undated) DEVICE — SOLUTION IRRIG 1000ML STRL H2O USP PLAS POUR BTL

## (undated) DEVICE — SYRINGE IRRIG 60ML SFT PLIABLE BLB EZ TO GRP 1 HND USE W/

## (undated) DEVICE — SUTURE V LOC 1 L18IN NONABSORBABLE GS-21 TAPERPOINT NDL VLOCN0327

## (undated) DEVICE — LEGGINGS: Brand: CONVERTORS

## (undated) DEVICE — SNARE ENDOSCP M L240CM W27MM SHTH DIA2.4MM CHN 2.8MM OVL

## (undated) DEVICE — COVER,MAYO STAND,STERILE: Brand: MEDLINE

## (undated) DEVICE — COLUMN DRAPE

## (undated) DEVICE — BINDER ABD M/L H12IN FOR 46-62IN WHT 4 SLD PNL DSGN HOOP

## (undated) DEVICE — ELECTRODE PT RET AD L9FT HI MOIST COND ADH HYDRGEL CORDED

## (undated) DEVICE — BLADE,CARBON-STEEL,15,STRL,DISPOSABLE,TB: Brand: MEDLINE

## (undated) DEVICE — TOWEL,OR,DSP,ST,BLUE,STD,4/PK,20PK/CS: Brand: MEDLINE

## (undated) DEVICE — CUFF BLD PRSS AD CLTH SGL TB W/ BAYNT CONN ROUNDED CORNER

## (undated) DEVICE — BLADE CLIPPER GEN PURP NS

## (undated) DEVICE — SUTURE PROL SZ 2-0 L36IN NONABSORBABLE BLU SH L26MM 1/2 CIR 8523H

## (undated) DEVICE — BLADELESS OBTURATOR: Brand: WECK VISTA

## (undated) DEVICE — SPONGE GZ W4XL4IN COT 12 PLY TYP VII WVN C FLD DSGN STERILE

## (undated) DEVICE — TIP COVER ACCESSORY

## (undated) DEVICE — NEEDLE INSUFFLATION 14 GAX120 MM SURGINEEDLE

## (undated) DEVICE — STAPLER INT DIA29MM CLS STPL H1.5-2.2MM OPN LEG L5.2MM 26

## (undated) DEVICE — ARM DRAPE

## (undated) DEVICE — HYPODERMIC SAFETY NEEDLE: Brand: MONOJECT

## (undated) DEVICE — ENDOSCOPIC KIT COMPLIANCE ENDOKIT

## (undated) DEVICE — GLOVE ORANGE PI 7 1/2   MSG9075

## (undated) DEVICE — 1LYRTR 16FR10ML100%SIL UMS SNP: Brand: MEDLINE INDUSTRIES, INC.

## (undated) DEVICE — GOWN,SIRUS,NONRNF,SETINSLV,2XL,18/CS: Brand: MEDLINE

## (undated) DEVICE — LIQUIBAND RAPID ADHESIVE 36/CS 0.8ML: Brand: MEDLINE

## (undated) DEVICE — SUTURE VICRYL + SZ 4-0 L27IN ABSRB UD PS-2 3/8 CIR REV CUT VCP426H

## (undated) DEVICE — SYRINGE 50ML E/T

## (undated) DEVICE — IV START KIT: Brand: MEDLINE

## (undated) DEVICE — SOLUTION ANTIFOG VIS SYS CLEARIFY LAPSCP

## (undated) DEVICE — SUTURE VCRL SZ 2-0 L27IN ABSRB UD L26MM SH 1/2 CIR J417H

## (undated) DEVICE — SUTURE PDS II SZ 0 L60IN ABSRB VLT L48MM CTX 1/2 CIR Z990G

## (undated) DEVICE — GRASPER ENDOSCP TIP L10MM ANVIL ROT RATCH HNDL DISP

## (undated) DEVICE — Device

## (undated) DEVICE — TRAP SPEC COLL POLYP POLYSTYR --

## (undated) DEVICE — TIP SUCT TRNSPAR RIB SURF STD BLB RIG NVENT W/ 5IN1 CONN DYND50138] MEDLINE INDUSTRIES INC]

## (undated) DEVICE — CYSTO MRMC: Brand: MEDLINE INDUSTRIES, INC.

## (undated) DEVICE — FORCEPS BX L240CM JAW DIA2.4MM ORNG L CAP W/ NDL DISP RAD

## (undated) DEVICE — SET GRAV CK VLV NEEDLESS ST 3 GANGED 4WAY STPCOCK HI FLO 10

## (undated) DEVICE — STAPLER 60: Brand: SUREFORM

## (undated) DEVICE — CANISTER, RIGID, 3000CC: Brand: MEDLINE INDUSTRIES, INC.

## (undated) DEVICE — SOLUTION LACTATED RINGERS INJECTION USP

## (undated) DEVICE — HYPODERMIC SAFETY NEEDLE: Brand: MAGELLAN

## (undated) DEVICE — PENCIL ES CRD L10FT HND SWCHING ROCK SWCH W/ EDGE COAT BLDE

## (undated) DEVICE — DRAPE,ROBOTICS,STERILE: Brand: MEDLINE

## (undated) DEVICE — SYRINGE MED 10ML LUERLOCK TIP W/O SFTY DISP

## (undated) DEVICE — CONTINU-FLO SOLUTION SET, 2 INJECTION SITES, MALE LUER LOCK ADAPTER WITH RETRACTABLE COLLAR, LARGE BORE STOPCOCK WITH ROTATING MALE LUER LOCK EXTENSION SET, 2 INJECTION SITES, MALE LUER LOCK ADAPTER WITH RETRACTABLE COLLAR: Brand: INTERLINK/CONTINU-FLO

## (undated) DEVICE — SPONGE LAPAROTOMY W18XL18IN WHITE STRUNG RADIOPAQUE STERILE

## (undated) DEVICE — 3M™ TEGADERM™ TRANSPARENT FILM DRESSING FRAME STYLE, 1624W, 2-3/8 IN X 2-3/4 IN (6 CM X 7 CM), 100/CT 4CT/CASE: Brand: 3M™ TEGADERM™

## (undated) DEVICE — SEAL

## (undated) DEVICE — CONTAINER SPEC 20 ML LID NEUT BUFF FORMALIN 10 % POLYPR STS

## (undated) DEVICE — SUTURE PROL SZ 2-0 L30IN NONABSORBABLE BLU L26MM CT-1 1/2 8423H

## (undated) DEVICE — OPEN-END URETERAL CATHETER: Brand: COOK

## (undated) DEVICE — ACCESS PLATFORM FOR MINIMALLY INVASIVE SURGERY: Brand: GELPOINT®  MINI ADVANCED ACCESS PLATFORM

## (undated) DEVICE — KENDALL DL ECG CABLE AND LEAD WIRE SYSTEM, 3-LEAD, SINGLE PATIENT USE: Brand: KENDALL

## (undated) DEVICE — VESSEL SEALER: Brand: ENDOWRIST

## (undated) DEVICE — SOLUTION IRRIGATION H2O 0797305] ICU MEDICAL INC]

## (undated) DEVICE — SUTURE MCRYL SZ 4-0 L27IN ABSRB UD L19MM PS-2 1/2 CIR PRIM Y426H

## (undated) DEVICE — GLOVE ORANGE PI 7   MSG9070